# Patient Record
Sex: FEMALE | Race: WHITE | NOT HISPANIC OR LATINO | URBAN - METROPOLITAN AREA
[De-identification: names, ages, dates, MRNs, and addresses within clinical notes are randomized per-mention and may not be internally consistent; named-entity substitution may affect disease eponyms.]

---

## 2023-04-19 ENCOUNTER — OFFICE VISIT (OUTPATIENT)
Dept: PRIMARY CARE | Facility: CLINIC | Age: 38
End: 2023-04-19
Payer: COMMERCIAL

## 2023-04-19 VITALS
HEART RATE: 62 BPM | WEIGHT: 180.13 LBS | SYSTOLIC BLOOD PRESSURE: 108 MMHG | DIASTOLIC BLOOD PRESSURE: 70 MMHG | TEMPERATURE: 98.2 F | BODY MASS INDEX: 31.92 KG/M2 | HEIGHT: 63 IN | RESPIRATION RATE: 16 BRPM | OXYGEN SATURATION: 99 %

## 2023-04-19 DIAGNOSIS — J06.9 VIRAL UPPER RESPIRATORY TRACT INFECTION: ICD-10-CM

## 2023-04-19 DIAGNOSIS — R53.83 OTHER FATIGUE: Primary | ICD-10-CM

## 2023-04-19 DIAGNOSIS — Z20.818 STREPTOCOCCUS EXPOSURE: ICD-10-CM

## 2023-04-19 PROCEDURE — 87081 CULTURE SCREEN ONLY: CPT

## 2023-04-19 PROCEDURE — 99214 OFFICE O/P EST MOD 30 MIN: CPT

## 2023-04-19 PROCEDURE — 1036F TOBACCO NON-USER: CPT

## 2023-04-19 RX ORDER — BENZONATATE 200 MG/1
CAPSULE ORAL
COMMUNITY
Start: 2023-04-17 | End: 2023-11-09 | Stop reason: ALTCHOICE

## 2023-04-19 RX ORDER — LANOLIN ALCOHOL/MO/W.PET/CERES
CREAM (GRAM) TOPICAL
COMMUNITY

## 2023-04-19 RX ORDER — AMOXICILLIN AND CLAVULANATE POTASSIUM 875; 125 MG/1; MG/1
1 TABLET, FILM COATED ORAL 2 TIMES DAILY
COMMUNITY
End: 2023-11-09 | Stop reason: ALTCHOICE

## 2023-04-19 RX ORDER — MULTIVITAMIN
TABLET ORAL
COMMUNITY

## 2023-04-19 RX ORDER — MOMETASONE FUROATE 110 UG/1
2 INHALANT RESPIRATORY (INHALATION)
Qty: 1 EACH | Refills: 11 | Status: SHIPPED | OUTPATIENT
Start: 2023-04-19 | End: 2023-11-09 | Stop reason: ALTCHOICE

## 2023-04-19 RX ORDER — ASPIRIN 325 MG
1 TABLET, DELAYED RELEASE (ENTERIC COATED) ORAL
COMMUNITY
Start: 2022-01-04

## 2023-04-19 ASSESSMENT — ENCOUNTER SYMPTOMS
SORE THROAT: 1
HEADACHES: 0
SWOLLEN GLANDS: 0
DIAPHORESIS: 0
WHEEZING: 0
RHINORRHEA: 0
MYALGIAS: 0
WEAKNESS: 0
CHANGE IN BOWEL HABIT: 0
NECK PAIN: 0
VISUAL CHANGE: 0
SINUS PAIN: 0
DIARRHEA: 0
CHILLS: 0
VERTIGO: 0
VOMITING: 0
FEVER: 0
NUMBNESS: 0
ANOREXIA: 0
COUGH: 1
ABDOMINAL PAIN: 0
NAUSEA: 0
DYSURIA: 0
JOINT SWELLING: 0
FATIGUE: 1

## 2023-04-19 ASSESSMENT — PATIENT HEALTH QUESTIONNAIRE - PHQ9
1. LITTLE INTEREST OR PLEASURE IN DOING THINGS: NOT AT ALL
2. FEELING DOWN, DEPRESSED OR HOPELESS: NOT AT ALL
SUM OF ALL RESPONSES TO PHQ9 QUESTIONS 1 AND 2: 0

## 2023-04-19 ASSESSMENT — COLUMBIA-SUICIDE SEVERITY RATING SCALE - C-SSRS
2. HAVE YOU ACTUALLY HAD ANY THOUGHTS OF KILLING YOURSELF?: NO
6. HAVE YOU EVER DONE ANYTHING, STARTED TO DO ANYTHING, OR PREPARED TO DO ANYTHING TO END YOUR LIFE?: NO
1. IN THE PAST MONTH, HAVE YOU WISHED YOU WERE DEAD OR WISHED YOU COULD GO TO SLEEP AND NOT WAKE UP?: NO

## 2023-04-19 NOTE — PROGRESS NOTES
Subjective   Patient ID: Karen Thomson is a 37 y.o. female who presents for URI and Fatigue.    URI   This is a new problem. The current episode started 1 to 4 weeks ago. The problem has been gradually improving. There has been no fever. Associated symptoms include congestion, coughing and a sore throat. Pertinent negatives include no abdominal pain, chest pain, diarrhea, dysuria, ear pain, headaches, joint pain, joint swelling, nausea, neck pain, plugged ear sensation, rash, rhinorrhea, sinus pain, sneezing, swollen glands, vomiting or wheezing. Associated symptoms comments: Apple watch has been having low spo2 readings as low as 89% urgent care gave her albuterol inhaler which has resolved this issue. . She has tried inhaler use for the symptoms. The treatment provided mild relief.   Fatigue  This is a new problem. The current episode started 1 to 4 weeks ago. The problem occurs constantly. The problem has been unchanged. Associated symptoms include congestion, coughing, fatigue and a sore throat. Pertinent negatives include no abdominal pain, anorexia, change in bowel habit, chest pain, chills, diaphoresis, fever, headaches, joint swelling, myalgias, nausea, neck pain, numbness, rash, swollen glands, urinary symptoms, vertigo, visual change, vomiting or weakness. The symptoms are aggravated by coughing. She has tried relaxation for the symptoms. The treatment provided no relief.    Of note 2 of her 3 children have strep, she was tested with a rapid on Monday is here today because she would like a follow-up lab test.     Review of Systems   Constitutional:  Positive for fatigue. Negative for chills, diaphoresis and fever.   HENT:  Positive for congestion and sore throat. Negative for ear pain, rhinorrhea, sinus pain and sneezing.    Respiratory:  Positive for cough. Negative for wheezing.    Cardiovascular:  Negative for chest pain.   Gastrointestinal:  Negative for abdominal pain, anorexia, change in bowel  "habit, diarrhea, nausea and vomiting.   Genitourinary:  Negative for dysuria.   Musculoskeletal:  Negative for joint pain, joint swelling, myalgias and neck pain.   Skin:  Negative for rash.   Neurological:  Negative for vertigo, weakness, numbness and headaches.       Objective   /70   Pulse 62   Temp 36.8 °C (98.2 °F)   Resp 16   Ht 1.6 m (5' 3\")   Wt 81.7 kg (180 lb 2 oz)   SpO2 99%   BMI 31.91 kg/m²     Physical Exam  Constitutional:       Appearance: Normal appearance.   HENT:      Mouth/Throat:      Lips: Pink.      Mouth: Mucous membranes are moist.      Pharynx: Posterior oropharyngeal erythema present. No pharyngeal swelling, oropharyngeal exudate or uvula swelling.   Cardiovascular:      Heart sounds: Normal heart sounds.   Pulmonary:      Breath sounds: Normal breath sounds.   Neurological:      Mental Status: She is alert.         Assessment/Plan   Problem List Items Addressed This Visit    None  Visit Diagnoses       Other fatigue    -  Primary    Relevant Orders    CBC    Comprehensive Metabolic Panel    Vitamin D 1,25 Dihydroxy    TSH with reflex to Free T4 if abnormal    Iron and TIBC    Viral upper respiratory tract infection        Relevant Medications    mometasone (Asmanex Twisthaler) 110 mcg/ actuation (30) inhaler    Other Relevant Orders    CBC    Streptococcus exposure        Relevant Orders    Group A Streptococcus, Culture               "

## 2023-04-19 NOTE — PATIENT INSTRUCTIONS
Today we ordered an inhaled corticosteroid (ICS) please use this daily for 2 weeks.  It is VERY VERY important that after use you rinse your mouth out to avoid the most common side affect from this medication, thrush.

## 2023-04-21 LAB — GROUP A STREP SCREEN, CULTURE: NORMAL

## 2023-05-05 ENCOUNTER — LAB (OUTPATIENT)
Dept: LAB | Facility: LAB | Age: 38
End: 2023-05-05
Payer: COMMERCIAL

## 2023-05-05 DIAGNOSIS — J06.9 VIRAL UPPER RESPIRATORY TRACT INFECTION: ICD-10-CM

## 2023-05-05 DIAGNOSIS — R53.83 OTHER FATIGUE: ICD-10-CM

## 2023-05-05 LAB
ALANINE AMINOTRANSFERASE (SGPT) (U/L) IN SER/PLAS: 14 U/L (ref 7–45)
ALBUMIN (G/DL) IN SER/PLAS: 3.8 G/DL (ref 3.4–5)
ALKALINE PHOSPHATASE (U/L) IN SER/PLAS: 52 U/L (ref 33–110)
ANION GAP IN SER/PLAS: 14 MMOL/L (ref 10–20)
ASPARTATE AMINOTRANSFERASE (SGOT) (U/L) IN SER/PLAS: 18 U/L (ref 9–39)
BILIRUBIN TOTAL (MG/DL) IN SER/PLAS: 0.6 MG/DL (ref 0–1.2)
CALCIUM (MG/DL) IN SER/PLAS: 8.4 MG/DL (ref 8.6–10.3)
CARBON DIOXIDE, TOTAL (MMOL/L) IN SER/PLAS: 22 MMOL/L (ref 21–32)
CHLORIDE (MMOL/L) IN SER/PLAS: 105 MMOL/L (ref 98–107)
CREATININE (MG/DL) IN SER/PLAS: 0.68 MG/DL (ref 0.5–1.05)
ERYTHROCYTE DISTRIBUTION WIDTH (RATIO) BY AUTOMATED COUNT: 12.9 % (ref 11.5–14.5)
ERYTHROCYTE MEAN CORPUSCULAR HEMOGLOBIN CONCENTRATION (G/DL) BY AUTOMATED: 32.1 G/DL (ref 32–36)
ERYTHROCYTE MEAN CORPUSCULAR VOLUME (FL) BY AUTOMATED COUNT: 88 FL (ref 80–100)
ERYTHROCYTES (10*6/UL) IN BLOOD BY AUTOMATED COUNT: 4.59 X10E12/L (ref 4–5.2)
GFR FEMALE: >90 ML/MIN/1.73M2
GLUCOSE (MG/DL) IN SER/PLAS: 66 MG/DL (ref 74–99)
HEMATOCRIT (%) IN BLOOD BY AUTOMATED COUNT: 40.2 % (ref 36–46)
HEMOGLOBIN (G/DL) IN BLOOD: 12.9 G/DL (ref 12–16)
IRON (UG/DL) IN SER/PLAS: 110 UG/DL (ref 35–150)
IRON BINDING CAPACITY (UG/DL) IN SER/PLAS: 368 UG/DL (ref 240–445)
IRON SATURATION (%) IN SER/PLAS: 30 % (ref 25–45)
LEUKOCYTES (10*3/UL) IN BLOOD BY AUTOMATED COUNT: 4.7 X10E9/L (ref 4.4–11.3)
PLATELETS (10*3/UL) IN BLOOD AUTOMATED COUNT: 195 X10E9/L (ref 150–450)
POTASSIUM (MMOL/L) IN SER/PLAS: 4.2 MMOL/L (ref 3.5–5.3)
PROTEIN TOTAL: 5.8 G/DL (ref 6.4–8.2)
SODIUM (MMOL/L) IN SER/PLAS: 137 MMOL/L (ref 136–145)
THYROTROPIN (MIU/L) IN SER/PLAS BY DETECTION LIMIT <= 0.05 MIU/L: 0.86 MIU/L (ref 0.44–3.98)
UREA NITROGEN (MG/DL) IN SER/PLAS: 15 MG/DL (ref 6–23)

## 2023-05-05 PROCEDURE — 85027 COMPLETE CBC AUTOMATED: CPT

## 2023-05-05 PROCEDURE — 82652 VIT D 1 25-DIHYDROXY: CPT

## 2023-05-05 PROCEDURE — 80053 COMPREHEN METABOLIC PANEL: CPT

## 2023-05-05 PROCEDURE — 83540 ASSAY OF IRON: CPT

## 2023-05-05 PROCEDURE — 83550 IRON BINDING TEST: CPT

## 2023-05-05 PROCEDURE — 36415 COLL VENOUS BLD VENIPUNCTURE: CPT

## 2023-05-05 PROCEDURE — 84443 ASSAY THYROID STIM HORMONE: CPT

## 2023-05-08 LAB — VITAMIN D 1,25-DIHYDROXY: 67.5 PG/ML (ref 19.9–79.3)

## 2023-07-13 ENCOUNTER — OFFICE VISIT (OUTPATIENT)
Dept: PRIMARY CARE | Facility: CLINIC | Age: 38
End: 2023-07-13
Payer: COMMERCIAL

## 2023-07-13 VITALS
HEIGHT: 63 IN | WEIGHT: 173 LBS | OXYGEN SATURATION: 98 % | BODY MASS INDEX: 30.65 KG/M2 | DIASTOLIC BLOOD PRESSURE: 72 MMHG | HEART RATE: 80 BPM | SYSTOLIC BLOOD PRESSURE: 107 MMHG

## 2023-07-13 DIAGNOSIS — G47.00 INSOMNIA, UNSPECIFIED TYPE: Primary | ICD-10-CM

## 2023-07-13 PROCEDURE — 1036F TOBACCO NON-USER: CPT

## 2023-07-13 PROCEDURE — 99213 OFFICE O/P EST LOW 20 MIN: CPT

## 2023-07-13 RX ORDER — PHENTERMINE HYDROCHLORIDE 37.5 MG/1
37.5 CAPSULE ORAL
COMMUNITY
Start: 2023-06-22 | End: 2023-11-09 | Stop reason: ALTCHOICE

## 2023-07-13 RX ORDER — HYDROXYZINE HYDROCHLORIDE 25 MG/1
25 TABLET, FILM COATED ORAL NIGHTLY
Qty: 30 TABLET | Refills: 3 | Status: SHIPPED | OUTPATIENT
Start: 2023-07-13 | End: 2023-11-09 | Stop reason: ALTCHOICE

## 2023-07-13 ASSESSMENT — ENCOUNTER SYMPTOMS
HEADACHES: 0
FEVER: 0
ANOREXIA: 0
NECK PAIN: 0
CHILLS: 0
INSOMNIA: 1
SORE THROAT: 0
SWOLLEN GLANDS: 0
JOINT SWELLING: 0
ABDOMINAL PAIN: 0
DIAPHORESIS: 0
CHANGE IN BOWEL HABIT: 0
VOMITING: 0
NUMBNESS: 0
ARTHRALGIAS: 0
VERTIGO: 0
WEAKNESS: 0
FATIGUE: 0
NAUSEA: 0
COUGH: 0
VISUAL CHANGE: 0
MYALGIAS: 0

## 2023-07-13 ASSESSMENT — PATIENT HEALTH QUESTIONNAIRE - PHQ9
1. LITTLE INTEREST OR PLEASURE IN DOING THINGS: NOT AT ALL
SUM OF ALL RESPONSES TO PHQ9 QUESTIONS 1 AND 2: 0
2. FEELING DOWN, DEPRESSED OR HOPELESS: NOT AT ALL

## 2023-07-13 NOTE — PROGRESS NOTES
"Subjective   Patient ID: Karen Thomson is a 37 y.o. female who presents for Insomnia (Karen is here today for trouble sleeping X a couple months).    Insomnia  This is a new problem. The current episode started more than 1 month ago. The problem occurs daily. The problem has been gradually worsening. Pertinent negatives include no abdominal pain, anorexia, arthralgias, change in bowel habit, chest pain, chills, congestion, coughing, diaphoresis, fatigue, fever, headaches, joint swelling, myalgias, nausea, neck pain, numbness, rash, sore throat, swollen glands, urinary symptoms, vertigo, visual change, vomiting or weakness. The symptoms are aggravated by stress (denies snoring, or partner snoring, has had stress due to passing of her father). Treatments tried: melatonin. The treatment provided mild relief.        Review of Systems   Constitutional:  Negative for chills, diaphoresis, fatigue and fever.   HENT:  Negative for congestion and sore throat.    Respiratory:  Negative for cough.    Cardiovascular:  Negative for chest pain.   Gastrointestinal:  Negative for abdominal pain, anorexia, change in bowel habit, nausea and vomiting.   Musculoskeletal:  Negative for arthralgias, joint swelling, myalgias and neck pain.   Skin:  Negative for rash.   Neurological:  Negative for vertigo, weakness, numbness and headaches.   Psychiatric/Behavioral:  The patient has insomnia.        Objective   /72   Pulse 80   Ht 1.6 m (5' 3\")   Wt 78.5 kg (173 lb)   SpO2 98%   BMI 30.65 kg/m²     Physical Exam  Vitals and nursing note reviewed.   Constitutional:       General: She is not in acute distress.     Appearance: Normal appearance. She is normal weight. She is not ill-appearing.   Cardiovascular:      Pulses: Normal pulses.      Heart sounds: Normal heart sounds.   Pulmonary:      Effort: Pulmonary effort is normal.      Breath sounds: Normal breath sounds.   Neurological:      Mental Status: She is alert and " oriented to person, place, and time.   Psychiatric:         Mood and Affect: Mood normal.         Behavior: Behavior normal.         Thought Content: Thought content normal.         Judgment: Judgment normal.         Assessment/Plan

## 2023-11-08 PROBLEM — R09.82 POSTNASAL DRIP: Status: ACTIVE | Noted: 2023-11-08

## 2023-11-08 PROBLEM — N92.0 MENORRHAGIA WITH REGULAR CYCLE: Status: ACTIVE | Noted: 2023-11-08

## 2023-11-08 PROBLEM — J30.2 SEASONAL ALLERGIES: Status: ACTIVE | Noted: 2020-02-26

## 2023-11-08 PROBLEM — E66.9 CLASS 1 OBESITY WITH BODY MASS INDEX (BMI) OF 33.0 TO 33.9 IN ADULT: Status: ACTIVE | Noted: 2023-11-08

## 2023-11-08 PROBLEM — J34.2 DEVIATED NASAL SEPTUM: Status: ACTIVE | Noted: 2023-11-08

## 2023-11-08 PROBLEM — N92.1 METRORRHAGIA: Status: ACTIVE | Noted: 2023-11-08

## 2023-11-08 PROBLEM — H02.889 MGD (MEIBOMIAN GLAND DISEASE): Status: ACTIVE | Noted: 2023-11-08

## 2023-11-08 PROBLEM — F41.9 ANXIETY: Status: ACTIVE | Noted: 2023-11-08

## 2023-11-08 PROBLEM — R79.89 LOW VITAMIN D LEVEL: Status: ACTIVE | Noted: 2023-11-08

## 2023-11-08 PROBLEM — F32.A DEPRESSION: Status: ACTIVE | Noted: 2023-11-08

## 2023-11-08 PROBLEM — Z98.891 HISTORY OF CESAREAN SECTION: Status: ACTIVE | Noted: 2020-02-26

## 2023-11-08 PROBLEM — R16.1 SPLENOMEGALY: Status: ACTIVE | Noted: 2023-11-08

## 2023-11-08 PROBLEM — J34.3 HYPERTROPHY OF BOTH INFERIOR NASAL TURBINATES: Status: ACTIVE | Noted: 2023-11-08

## 2023-11-08 PROBLEM — E66.9 OBESITY (BMI 30-39.9): Status: ACTIVE | Noted: 2023-11-08

## 2023-11-08 PROBLEM — R63.2 POLYPHAGIA: Status: ACTIVE | Noted: 2023-11-08

## 2023-11-08 PROBLEM — E66.811 CLASS 1 OBESITY WITH BODY MASS INDEX (BMI) OF 33.0 TO 33.9 IN ADULT: Status: ACTIVE | Noted: 2023-11-08

## 2023-11-08 PROBLEM — R93.89 ABNORMAL CT SCAN, CHEST: Status: ACTIVE | Noted: 2023-11-08

## 2023-11-08 RX ORDER — SERTRALINE HYDROCHLORIDE 50 MG/1
50 TABLET, FILM COATED ORAL DAILY
COMMUNITY
End: 2023-11-09 | Stop reason: ALTCHOICE

## 2023-11-09 ENCOUNTER — OFFICE VISIT (OUTPATIENT)
Dept: OBSTETRICS AND GYNECOLOGY | Facility: CLINIC | Age: 38
End: 2023-11-09
Payer: COMMERCIAL

## 2023-11-09 VITALS
DIASTOLIC BLOOD PRESSURE: 70 MMHG | BODY MASS INDEX: 30.48 KG/M2 | WEIGHT: 172 LBS | SYSTOLIC BLOOD PRESSURE: 114 MMHG | HEIGHT: 63 IN

## 2023-11-09 DIAGNOSIS — Z01.419 WELL WOMAN EXAM: Primary | ICD-10-CM

## 2023-11-09 PROCEDURE — 99395 PREV VISIT EST AGE 18-39: CPT | Performed by: OBSTETRICS & GYNECOLOGY

## 2023-11-09 PROCEDURE — 1036F TOBACCO NON-USER: CPT | Performed by: OBSTETRICS & GYNECOLOGY

## 2023-11-09 ASSESSMENT — ENCOUNTER SYMPTOMS
CARDIOVASCULAR NEGATIVE: 1
HEMATOLOGIC/LYMPHATIC NEGATIVE: 1
NEUROLOGICAL NEGATIVE: 1
ALLERGIC/IMMUNOLOGIC NEGATIVE: 1
RESPIRATORY NEGATIVE: 1
PSYCHIATRIC NEGATIVE: 1
EYES NEGATIVE: 1
MUSCULOSKELETAL NEGATIVE: 1
GASTROINTESTINAL NEGATIVE: 1
ENDOCRINE NEGATIVE: 1
CONSTITUTIONAL NEGATIVE: 1

## 2023-11-09 NOTE — PROGRESS NOTES
Subjective   Patient ID: Karen Thomson is a 38 y.o. female who presents for Annual Exam (BRENDEN//LAST PAP: 06/10/2022/LAST MAMM: n/a//Chaperone Declined, Neisha Cabrera, MAII/).  Health is good.    Weight loss journey.    Phentermine working for her.  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   Dad  over the summer.   Goes to the heart doctor, but normal cholesterol.         Review of Systems   Constitutional: Negative.    HENT: Negative.     Eyes: Negative.    Respiratory: Negative.     Cardiovascular: Negative.    Gastrointestinal: Negative.    Endocrine: Negative.    Genitourinary: Negative.    Musculoskeletal: Negative.    Skin: Negative.    Allergic/Immunologic: Negative.    Neurological: Negative.    Hematological: Negative.    Psychiatric/Behavioral: Negative.         Objective   Physical Exam  Constitutional:       Appearance: Normal appearance. She is normal weight.   HENT:      Head: Normocephalic.   Neck:      Thyroid: No thyroid mass or thyromegaly.   Pulmonary:      Effort: Pulmonary effort is normal.   Chest:      Chest wall: No mass.   Breasts:     Right: Normal.      Left: Normal.   Abdominal:      Palpations: Abdomen is soft.   Genitourinary:     General: Normal vulva.      Exam position: Lithotomy position.      Labia:         Right: No tenderness.         Left: No tenderness.       Vagina: Normal.      Cervix: Normal.      Uterus: Normal.       Adnexa: Right adnexa normal and left adnexa normal.   Musculoskeletal:         General: Normal range of motion.      Cervical back: Normal range of motion and neck supple.   Lymphadenopathy:      Upper Body:      Right upper body: No supraclavicular or axillary adenopathy.      Left upper body: No supraclavicular or axillary adenopathy.   Skin:     General: Skin is warm and dry.   Neurological:      General: No focal deficit present.      Mental Status: She is alert.   Psychiatric:         Mood and Affect: Mood normal.         Behavior: Behavior normal.          Thought Content: Thought content normal.         Judgment: Judgment normal.         Assessment/Plan   Problem List Items Addressed This Visit             ICD-10-CM    Well woman exam - Primary Z01.419     Pap is due in 2027.  Vas for birth control.  Mammogram next year.

## 2024-01-04 ENCOUNTER — OFFICE VISIT (OUTPATIENT)
Dept: PRIMARY CARE | Facility: CLINIC | Age: 39
End: 2024-01-04
Payer: COMMERCIAL

## 2024-01-04 VITALS
RESPIRATION RATE: 16 BRPM | WEIGHT: 183.8 LBS | BODY MASS INDEX: 32.57 KG/M2 | OXYGEN SATURATION: 98 % | DIASTOLIC BLOOD PRESSURE: 66 MMHG | SYSTOLIC BLOOD PRESSURE: 122 MMHG | HEART RATE: 92 BPM | HEIGHT: 63 IN

## 2024-01-04 DIAGNOSIS — E66.9 OBESITY (BMI 30.0-34.9): Primary | ICD-10-CM

## 2024-01-04 PROCEDURE — 99214 OFFICE O/P EST MOD 30 MIN: CPT | Performed by: STUDENT IN AN ORGANIZED HEALTH CARE EDUCATION/TRAINING PROGRAM

## 2024-01-04 PROCEDURE — 1036F TOBACCO NON-USER: CPT | Performed by: STUDENT IN AN ORGANIZED HEALTH CARE EDUCATION/TRAINING PROGRAM

## 2024-01-04 RX ORDER — SEMAGLUTIDE 0.25 MG/.5ML
0.25 INJECTION, SOLUTION SUBCUTANEOUS
Qty: 6 ML | Refills: 0 | Status: SHIPPED | OUTPATIENT
Start: 2024-01-04 | End: 2024-01-15 | Stop reason: SDUPTHER

## 2024-01-04 ASSESSMENT — PATIENT HEALTH QUESTIONNAIRE - PHQ9
SUM OF ALL RESPONSES TO PHQ9 QUESTIONS 1 AND 2: 0
1. LITTLE INTEREST OR PLEASURE IN DOING THINGS: NOT AT ALL
2. FEELING DOWN, DEPRESSED OR HOPELESS: NOT AT ALL

## 2024-01-04 NOTE — PROGRESS NOTES
Subjective   Patient ID: Karen Thomson is a 38 y.o. female who presents for Follow-up (Pt is here for discuss ozempic.).    Pt presents to the office for discussion for weight loss. Pt has struggled off and on for years regarding her weight. Struggled through the holidays with break with holidays.    Prior Weight: 222lbs  Current Weight: 183lbs    Weight Loss strategies tried: Intermittent phentermine, weight watchers, working with comprehensive weight loss management      Exercise: Sit-ups, arm weights, home gym with the treadmill.      Social:  with Children.   Occupation:    Alcohol: Occasional   Smoke: Non-Smoker  Drugs: No hx of Drug Use     Family hx significant for: CV Disease, OCD, MDD, BROCK, Crohns, DMII, Cancer  Surgical hx significant for: Tonsillectomy, Egg Retrieval, Removal of Uterine Mass,               Objective   Physical Exam  Vitals reviewed.   Constitutional:       General: She is not in acute distress.     Appearance: She is not ill-appearing.   HENT:      Right Ear: Tympanic membrane and ear canal normal.      Left Ear: Tympanic membrane and ear canal normal.      Mouth/Throat:      Mouth: Mucous membranes are moist.      Pharynx: Oropharynx is clear. No oropharyngeal exudate or posterior oropharyngeal erythema.   Eyes:      Extraocular Movements: Extraocular movements intact.      Conjunctiva/sclera: Conjunctivae normal.      Pupils: Pupils are equal, round, and reactive to light.   Neck:      Vascular: No carotid bruit.   Cardiovascular:      Rate and Rhythm: Normal rate and regular rhythm.      Heart sounds: No murmur heard.     No gallop.   Pulmonary:      Effort: Pulmonary effort is normal.      Breath sounds: Normal breath sounds. No wheezing, rhonchi or rales.   Abdominal:      General: Abdomen is flat. Bowel sounds are normal.      Palpations: Abdomen is soft.      Tenderness: There is no abdominal tenderness.   Musculoskeletal:      Cervical  back: Neck supple.      Left lower leg: No edema.   Skin:     General: Skin is warm and dry.      Findings: No rash.   Neurological:      General: No focal deficit present.      Mental Status: She is alert and oriented to person, place, and time.      Gait: Gait normal.   Psychiatric:         Mood and Affect: Mood normal.         Behavior: Behavior normal.         Assessment/Plan     Obesity   Current weight:   Current BMI: 32.56  Prior Weight: 222lbs  Current Weight: 183  Weight Loss strategies tried: Intermittent phentermine, weight watchers, working with comprehensive weight loss management   Attempts since 2019  Will trial wegovy 0.25mg weekly   Labs ordered: cbc, cmp, lipid panel, tsh, vit d, insulin        Aleah Webber DO 01/04/24 9:04 AM

## 2024-01-05 ENCOUNTER — LAB (OUTPATIENT)
Dept: LAB | Facility: LAB | Age: 39
End: 2024-01-05
Payer: COMMERCIAL

## 2024-01-05 DIAGNOSIS — E66.9 OBESITY (BMI 30.0-34.9): ICD-10-CM

## 2024-01-05 LAB
25(OH)D3 SERPL-MCNC: 45 NG/ML (ref 30–100)
ALBUMIN SERPL BCP-MCNC: 4 G/DL (ref 3.4–5)
ALP SERPL-CCNC: 59 U/L (ref 33–110)
ALT SERPL W P-5'-P-CCNC: 13 U/L (ref 7–45)
ANION GAP SERPL CALC-SCNC: 12 MMOL/L (ref 10–20)
AST SERPL W P-5'-P-CCNC: 17 U/L (ref 9–39)
BASOPHILS # BLD AUTO: 0.04 X10*3/UL (ref 0–0.1)
BASOPHILS NFR BLD AUTO: 0.9 %
BILIRUB SERPL-MCNC: 0.9 MG/DL (ref 0–1.2)
BUN SERPL-MCNC: 16 MG/DL (ref 6–23)
CALCIUM SERPL-MCNC: 8.9 MG/DL (ref 8.6–10.3)
CHLORIDE SERPL-SCNC: 105 MMOL/L (ref 98–107)
CHOLEST SERPL-MCNC: 191 MG/DL (ref 0–199)
CHOLESTEROL/HDL RATIO: 3
CO2 SERPL-SCNC: 25 MMOL/L (ref 21–32)
CREAT SERPL-MCNC: 0.68 MG/DL (ref 0.5–1.05)
EOSINOPHIL # BLD AUTO: 0.12 X10*3/UL (ref 0–0.7)
EOSINOPHIL NFR BLD AUTO: 2.8 %
ERYTHROCYTE [DISTWIDTH] IN BLOOD BY AUTOMATED COUNT: 12.9 % (ref 11.5–14.5)
GFR SERPL CREATININE-BSD FRML MDRD: >90 ML/MIN/1.73M*2
GLUCOSE SERPL-MCNC: 78 MG/DL (ref 74–99)
HCT VFR BLD AUTO: 41 % (ref 36–46)
HDLC SERPL-MCNC: 63.2 MG/DL
HGB BLD-MCNC: 13.3 G/DL (ref 12–16)
IMM GRANULOCYTES # BLD AUTO: 0.01 X10*3/UL (ref 0–0.7)
IMM GRANULOCYTES NFR BLD AUTO: 0.2 % (ref 0–0.9)
INSULIN SERPL-ACNC: 8 UIU/ML (ref 3–25)
LDLC SERPL CALC-MCNC: 103 MG/DL
LYMPHOCYTES # BLD AUTO: 1.91 X10*3/UL (ref 1.2–4.8)
LYMPHOCYTES NFR BLD AUTO: 44.2 %
MCH RBC QN AUTO: 28.5 PG (ref 26–34)
MCHC RBC AUTO-ENTMCNC: 32.4 G/DL (ref 32–36)
MCV RBC AUTO: 88 FL (ref 80–100)
MONOCYTES # BLD AUTO: 0.44 X10*3/UL (ref 0.1–1)
MONOCYTES NFR BLD AUTO: 10.2 %
NEUTROPHILS # BLD AUTO: 1.8 X10*3/UL (ref 1.2–7.7)
NEUTROPHILS NFR BLD AUTO: 41.7 %
NON HDL CHOLESTEROL: 128 MG/DL (ref 0–149)
NRBC BLD-RTO: 0 /100 WBCS (ref 0–0)
PLATELET # BLD AUTO: 195 X10*3/UL (ref 150–450)
POTASSIUM SERPL-SCNC: 4.1 MMOL/L (ref 3.5–5.3)
PROT SERPL-MCNC: 6.3 G/DL (ref 6.4–8.2)
RBC # BLD AUTO: 4.67 X10*6/UL (ref 4–5.2)
SODIUM SERPL-SCNC: 138 MMOL/L (ref 136–145)
TRIGL SERPL-MCNC: 122 MG/DL (ref 0–149)
TSH SERPL-ACNC: 1.34 MIU/L (ref 0.44–3.98)
VLDL: 24 MG/DL (ref 0–40)
WBC # BLD AUTO: 4.3 X10*3/UL (ref 4.4–11.3)

## 2024-01-05 PROCEDURE — 80061 LIPID PANEL: CPT

## 2024-01-05 PROCEDURE — 80053 COMPREHEN METABOLIC PANEL: CPT

## 2024-01-05 PROCEDURE — 83525 ASSAY OF INSULIN: CPT

## 2024-01-05 PROCEDURE — 84443 ASSAY THYROID STIM HORMONE: CPT

## 2024-01-05 PROCEDURE — 85025 COMPLETE CBC W/AUTO DIFF WBC: CPT

## 2024-01-05 PROCEDURE — 82306 VITAMIN D 25 HYDROXY: CPT

## 2024-01-05 PROCEDURE — 36415 COLL VENOUS BLD VENIPUNCTURE: CPT

## 2024-01-15 ENCOUNTER — APPOINTMENT (OUTPATIENT)
Dept: PRIMARY CARE | Facility: CLINIC | Age: 39
End: 2024-01-15
Payer: COMMERCIAL

## 2024-01-15 DIAGNOSIS — E66.9 OBESITY (BMI 30.0-34.9): ICD-10-CM

## 2024-01-15 RX ORDER — SEMAGLUTIDE 0.25 MG/.5ML
0.25 INJECTION, SOLUTION SUBCUTANEOUS
Qty: 6 ML | Refills: 0 | Status: SHIPPED | OUTPATIENT
Start: 2024-01-15 | End: 2024-01-24 | Stop reason: WASHOUT

## 2024-01-17 ENCOUNTER — TELEPHONE (OUTPATIENT)
Dept: PRIMARY CARE | Facility: CLINIC | Age: 39
End: 2024-01-17
Payer: COMMERCIAL

## 2024-01-24 ENCOUNTER — PATIENT MESSAGE (OUTPATIENT)
Dept: PRIMARY CARE | Facility: CLINIC | Age: 39
End: 2024-01-24

## 2024-01-24 ENCOUNTER — OFFICE VISIT (OUTPATIENT)
Dept: PRIMARY CARE | Facility: CLINIC | Age: 39
End: 2024-01-24
Payer: COMMERCIAL

## 2024-01-24 VITALS
OXYGEN SATURATION: 98 % | SYSTOLIC BLOOD PRESSURE: 130 MMHG | HEART RATE: 78 BPM | WEIGHT: 188 LBS | BODY MASS INDEX: 33.31 KG/M2 | DIASTOLIC BLOOD PRESSURE: 87 MMHG | HEIGHT: 63 IN

## 2024-01-24 DIAGNOSIS — R53.82 CHRONIC FATIGUE: ICD-10-CM

## 2024-01-24 DIAGNOSIS — E66.09 CLASS 1 OBESITY DUE TO EXCESS CALORIES WITH BODY MASS INDEX (BMI) OF 33.0 TO 33.9 IN ADULT, UNSPECIFIED WHETHER SERIOUS COMORBIDITY PRESENT: Primary | ICD-10-CM

## 2024-01-24 DIAGNOSIS — B02.9 HERPES ZOSTER WITHOUT COMPLICATION: Primary | ICD-10-CM

## 2024-01-24 DIAGNOSIS — Z71.3 WEIGHT LOSS COUNSELING, ENCOUNTER FOR: ICD-10-CM

## 2024-01-24 PROCEDURE — 1036F TOBACCO NON-USER: CPT

## 2024-01-24 PROCEDURE — 99213 OFFICE O/P EST LOW 20 MIN: CPT

## 2024-01-24 RX ORDER — TRIAMCINOLONE ACETONIDE 1 MG/G
OINTMENT TOPICAL
COMMUNITY
Start: 2024-01-13 | End: 2024-01-24 | Stop reason: SINTOL

## 2024-01-24 RX ORDER — PREDNISONE 10 MG/1
TABLET ORAL
COMMUNITY
Start: 2024-01-13 | End: 2024-03-27 | Stop reason: ALTCHOICE

## 2024-01-24 RX ORDER — TRIAMCINOLONE ACETONIDE 1 MG/G
OINTMENT TOPICAL 2 TIMES DAILY PRN
Qty: 60 G | Refills: 5 | Status: SHIPPED | OUTPATIENT
Start: 2024-01-24 | End: 2024-04-04 | Stop reason: WASHOUT

## 2024-01-24 RX ORDER — GABAPENTIN 100 MG/1
100 CAPSULE ORAL 3 TIMES DAILY
Qty: 90 CAPSULE | Refills: 0 | Status: SHIPPED | OUTPATIENT
Start: 2024-01-24 | End: 2024-03-27 | Stop reason: ALTCHOICE

## 2024-01-24 ASSESSMENT — PATIENT HEALTH QUESTIONNAIRE - PHQ9
SUM OF ALL RESPONSES TO PHQ9 QUESTIONS 1 AND 2: 0
2. FEELING DOWN, DEPRESSED OR HOPELESS: NOT AT ALL
1. LITTLE INTEREST OR PLEASURE IN DOING THINGS: NOT AT ALL

## 2024-01-24 NOTE — PROGRESS NOTES
"Subjective   Patient ID: Karen Thomson is a 38 y.o. female who presents for Follow-up (Saw derm on 1/23, possible shingles on left leg and up to back area on left side thought was from hot tub also went to  on 1/13 after having rash for 3 days ).    12/18- rash eye and mouth [facility] gave doxy and steroid cream  1/6- [facility] [place], rash back right leg and lower back-painful  1/13-urgent care declared hot tub rash prednisone and stronger cream  1/23-[facility] for follow-up face and mouth clear doxy finished Sunday         Review of Systems   Constitutional:  Negative for fatigue and fever.   Respiratory:  Negative for cough and shortness of breath.    Cardiovascular:  Negative for chest pain and leg swelling.   Musculoskeletal:  Negative for gait problem and neck pain.   Skin:  Positive for rash. Negative for color change, pallor and wound.       Objective   /87   Pulse 78   Ht 1.6 m (5' 3\")   Wt 85.3 kg (188 lb)   SpO2 98%   BMI 33.30 kg/m²     Physical Exam  Vitals and nursing note reviewed.   Constitutional:       Appearance: Normal appearance.   Cardiovascular:      Pulses: Normal pulses.      Heart sounds: Normal heart sounds.   Pulmonary:      Effort: Pulmonary effort is normal.      Breath sounds: Normal breath sounds.   Skin:         Neurological:      Mental Status: She is alert.         Assessment/Plan   Problem List Items Addressed This Visit    None  Visit Diagnoses         Codes    Herpes zoster without complication    -  Primary B02.9    Relevant Medications    triamcinolone (Kenalog) 0.1 % ointment    gabapentin (Neurontin) 100 mg capsule               "

## 2024-01-25 ASSESSMENT — ENCOUNTER SYMPTOMS
COLOR CHANGE: 0
WOUND: 0
SHORTNESS OF BREATH: 0
COUGH: 0
FEVER: 0
FATIGUE: 0
NECK PAIN: 0

## 2024-02-14 ENCOUNTER — TELEPHONE (OUTPATIENT)
Dept: PRIMARY CARE | Facility: CLINIC | Age: 39
End: 2024-02-14
Payer: COMMERCIAL

## 2024-02-14 DIAGNOSIS — E66.09 CLASS 1 OBESITY DUE TO EXCESS CALORIES WITH BODY MASS INDEX (BMI) OF 33.0 TO 33.9 IN ADULT, UNSPECIFIED WHETHER SERIOUS COMORBIDITY PRESENT: ICD-10-CM

## 2024-02-14 DIAGNOSIS — Z71.3 WEIGHT LOSS COUNSELING, ENCOUNTER FOR: ICD-10-CM

## 2024-02-20 ENCOUNTER — OFFICE VISIT (OUTPATIENT)
Dept: PRIMARY CARE | Facility: CLINIC | Age: 39
End: 2024-02-20
Payer: COMMERCIAL

## 2024-02-20 VITALS
DIASTOLIC BLOOD PRESSURE: 76 MMHG | HEART RATE: 74 BPM | BODY MASS INDEX: 31.89 KG/M2 | WEIGHT: 180 LBS | OXYGEN SATURATION: 98 % | SYSTOLIC BLOOD PRESSURE: 112 MMHG

## 2024-02-20 DIAGNOSIS — E66.09 CLASS 1 OBESITY DUE TO EXCESS CALORIES WITH BODY MASS INDEX (BMI) OF 33.0 TO 33.9 IN ADULT, UNSPECIFIED WHETHER SERIOUS COMORBIDITY PRESENT: ICD-10-CM

## 2024-02-20 DIAGNOSIS — Z71.3 WEIGHT LOSS COUNSELING, ENCOUNTER FOR: Primary | ICD-10-CM

## 2024-02-20 PROCEDURE — 1036F TOBACCO NON-USER: CPT

## 2024-02-20 PROCEDURE — 3008F BODY MASS INDEX DOCD: CPT

## 2024-02-20 PROCEDURE — 99213 OFFICE O/P EST LOW 20 MIN: CPT

## 2024-02-20 ASSESSMENT — ENCOUNTER SYMPTOMS
DIARRHEA: 0
ACTIVITY CHANGE: 0
ABDOMINAL PAIN: 0
CONSTIPATION: 0
NAUSEA: 0
FEVER: 0
PALPITATIONS: 0
APPETITE CHANGE: 0
FATIGUE: 0
UNEXPECTED WEIGHT CHANGE: 0

## 2024-02-20 NOTE — PROGRESS NOTES
Subjective   Patient ID: Karen Thomson is a 38 y.o. female who presents for Follow-up (Follow up on weight zep bound , doesn't feel like it suppresses her appetite or anything ).    Subjective  Karen Thomson is a 38 y.o. female here for discussion regarding weight loss. She has noted a weight gain of approximately 50 pounds over the last 5 years. She feels ideal weight is 160 pounds. Weight at graduation from high school was 120 pounds. History of eating disorders: anorexia nervosa. There is a family history positive for obesity in the patient, mother, father, sister, and brother. Previous treatments for obesity include self-directed dieting, supervised diet program, very low calorie diet, Weight Watchers, and phentermine. Obesity associated medical conditions: none. Obesity associated medications: none. Cardiovascular risk factors besides obesity: obesity (BMI >= 30 kg/m2).    Starting weight 222  Now 180 down 8 lbs this month       Review of Systems   Constitutional:  Negative for activity change, appetite change, fatigue, fever and unexpected weight change.   Cardiovascular:  Negative for chest pain, palpitations and leg swelling.   Gastrointestinal:  Negative for abdominal pain, constipation, diarrhea and nausea.       Objective   /76   Pulse 74   Wt 81.6 kg (180 lb)   SpO2 98%   BMI 31.89 kg/m²     Physical Exam  Vitals and nursing note reviewed.   Constitutional:       Appearance: Normal appearance. She is obese.   Cardiovascular:      Pulses: Normal pulses.      Heart sounds: Normal heart sounds.   Pulmonary:      Effort: Pulmonary effort is normal.      Breath sounds: Normal breath sounds.   Neurological:      Mental Status: She is alert.         Assessment/Plan   Problem List Items Addressed This Visit             ICD-10-CM    Class 1 obesity with body mass index (BMI) of 33.0 to 33.9 in adult E66.9, Z68.33    Relevant Medications    tirzepatide, weight loss, (Zepbound) 5 mg/0.5 mL  injection     Other Visit Diagnoses         Codes    Weight loss counseling, encounter for     Z71.3    Relevant Medications    tirzepatide, weight loss, (Zepbound) 5 mg/0.5 mL injection        Followup in one month for weight check and dose adjustment.

## 2024-03-11 ENCOUNTER — TELEPHONE (OUTPATIENT)
Dept: OBSTETRICS AND GYNECOLOGY | Facility: CLINIC | Age: 39
End: 2024-03-11
Payer: COMMERCIAL

## 2024-03-11 NOTE — TELEPHONE ENCOUNTER
Pt called stating that she was seen at an urgent care over the weekend and tx'd with Levaquin for a sinus infection and a vaginal boil. Pt states boil was not accessed but she was told the ATB would help with it also. Pt said that she knicked her upper labia, near her clitoris while shaving last week. The area became red, sore ans swollen. It popped in the shower and drained pus. Pt reports the area is somewhat better,  and red. Warm compresses, keeping area clean and dry and using bacitracin reviewed. Pt will monitor area and follow up in office if sx do not resolve after ATB tx or if they become worse.

## 2024-03-27 ENCOUNTER — OFFICE VISIT (OUTPATIENT)
Dept: PRIMARY CARE | Facility: CLINIC | Age: 39
End: 2024-03-27
Payer: COMMERCIAL

## 2024-03-27 ENCOUNTER — TELEPHONE (OUTPATIENT)
Dept: PRIMARY CARE | Facility: CLINIC | Age: 39
End: 2024-03-27

## 2024-03-27 VITALS
HEIGHT: 63 IN | BODY MASS INDEX: 30.97 KG/M2 | WEIGHT: 174.8 LBS | OXYGEN SATURATION: 98 % | SYSTOLIC BLOOD PRESSURE: 98 MMHG | DIASTOLIC BLOOD PRESSURE: 62 MMHG | RESPIRATION RATE: 18 BRPM | HEART RATE: 78 BPM

## 2024-03-27 DIAGNOSIS — Z71.3 WEIGHT LOSS COUNSELING, ENCOUNTER FOR: ICD-10-CM

## 2024-03-27 DIAGNOSIS — E66.09 CLASS 1 OBESITY DUE TO EXCESS CALORIES WITH BODY MASS INDEX (BMI) OF 33.0 TO 33.9 IN ADULT, UNSPECIFIED WHETHER SERIOUS COMORBIDITY PRESENT: ICD-10-CM

## 2024-03-27 DIAGNOSIS — E66.9 OBESITY (BMI 30-39.9): Primary | ICD-10-CM

## 2024-03-27 PROBLEM — E66.811 CLASS 1 OBESITY WITH BODY MASS INDEX (BMI) OF 33.0 TO 33.9 IN ADULT: Status: RESOLVED | Noted: 2023-11-08 | Resolved: 2024-03-27

## 2024-03-27 PROBLEM — N92.1 METRORRHAGIA: Status: RESOLVED | Noted: 2023-11-08 | Resolved: 2024-03-27

## 2024-03-27 PROBLEM — Z98.891 HISTORY OF CESAREAN SECTION: Status: RESOLVED | Noted: 2020-02-26 | Resolved: 2024-03-27

## 2024-03-27 PROBLEM — R79.89 LOW VITAMIN D LEVEL: Status: RESOLVED | Noted: 2023-11-08 | Resolved: 2024-03-27

## 2024-03-27 PROCEDURE — 3008F BODY MASS INDEX DOCD: CPT | Performed by: NURSE PRACTITIONER

## 2024-03-27 PROCEDURE — 1036F TOBACCO NON-USER: CPT | Performed by: NURSE PRACTITIONER

## 2024-03-27 PROCEDURE — 99213 OFFICE O/P EST LOW 20 MIN: CPT | Performed by: NURSE PRACTITIONER

## 2024-03-27 RX ORDER — SEMAGLUTIDE 0.5 MG/.5ML
0.5 INJECTION, SOLUTION SUBCUTANEOUS
Qty: 2 ML | Refills: 2 | Status: SHIPPED | OUTPATIENT
Start: 2024-03-27 | End: 2024-05-08 | Stop reason: RX

## 2024-03-27 NOTE — ASSESSMENT & PLAN NOTE
- eat off the smaller plate (like the salad plate)  - half the plate should be vegetables, 1/4 protein, 1/4 carbs - for lunch and dinner  -  discussed dietary changes including proper protein intake, increase vegetable intake, fruit intake, low carbohydrate intake, and no processed food intake.  - discussed meal prepping    put your fork down between bites  - drink at least 64 oz of water day.  do not consume large amounts of water with your meal  - do not drink sugary drinks such as pop or specialty coffee drinks  -  eat your vegetables and protein first.  Have vegetables at lunch and dinner  - discussed with patient to increase activity 4 days a week preferably 5. Exercise should be done 5 days a week including walking for 20-30 minutes each day.  Start slowly if you have not been exercising - start with 15 minutes 3-4 times a week.   -  keep log of calorie intake and food intake and bring with you to next appointment.You can use an brenda on your phone such as my fitness pal.  - discussed with patient using activity trackers such as a fit bit. log  activity daily and bring  activity log at next visit  - increase your protein intake - should have at least 4 servings of protein per day  - decrease carb intake - discussed carb serving size and to read packages  - limit carb servings to 4 servings a day  -* difficulty obtaining zepbound so will try wegovy 0.5 mg once a week  - follow up in 6 weeks for weight check and will order labs as she has stopped eating protein

## 2024-03-27 NOTE — TELEPHONE ENCOUNTER
Karen called stating that she reached out to [facility] and they have Zepound in stock. She would like her script to go to mail order ([facility]). Pharmacy updated in chart.

## 2024-03-27 NOTE — PATIENT INSTRUCTIONS
Finish your last dose of zepbound this week  I sent in wegovy to see if that is back in stock.  If not - may need to try your mail order pharmacy  Please follow up in 6 weeks for a weight check

## 2024-03-27 NOTE — PROGRESS NOTES
"Subjective   Patient ID: Karen Thomson is a 38 y.o. female who presents for Weight Check (Karen is in today for weight check. Stated she is having a difficult time finding pharmacies who have Zepbound, but besides that no other concerns. ).    Presents to follow up for obesity   Weight in January 2024 183  Current weight 174  Has tried weight watchers, phenteramine, exercise.  Attempted to start wegovy in January - but was on backorder.  Zepbound was approved by insurance in Feb and she started at 2.5 mg.  She was increased to 5 mg 4 weeks ago.  She has lost 9 pounds since January.  She has noticed that her appetite has greatly.  She denies constipation, diarrhea, nausea, vomiting, or  headaches  She is having difficulty obtaining the zepbound.           Review of Systems   All other systems reviewed and are negative.      Objective   BP 98/62   Pulse 78   Resp 18   Ht 1.6 m (5' 3\")   Wt 79.3 kg (174 lb 12.8 oz)   SpO2 98%   BMI 30.96 kg/m²     Physical Exam  Vitals and nursing note reviewed.   Constitutional:       General: She is not in acute distress.     Appearance: Normal appearance. She is normal weight.   HENT:      Head: Normocephalic and atraumatic.      Right Ear: External ear normal.      Left Ear: External ear normal.      Nose: Nose normal.      Mouth/Throat:      Mouth: Mucous membranes are moist.      Pharynx: Oropharynx is clear.   Eyes:      Extraocular Movements: Extraocular movements intact.      Conjunctiva/sclera: Conjunctivae normal.      Pupils: Pupils are equal, round, and reactive to light.   Neck:      Vascular: No carotid bruit.   Cardiovascular:      Rate and Rhythm: Normal rate and regular rhythm.      Pulses: Normal pulses.      Heart sounds: Normal heart sounds.   Pulmonary:      Effort: Pulmonary effort is normal.      Breath sounds: Normal breath sounds.   Musculoskeletal:      Cervical back: Normal range of motion and neck supple.   Lymphadenopathy:      Cervical: No " cervical adenopathy.   Skin:     General: Skin is warm and dry.      Capillary Refill: Capillary refill takes less than 2 seconds.   Neurological:      Mental Status: She is alert and oriented to person, place, and time.   Psychiatric:         Mood and Affect: Mood normal.         Behavior: Behavior normal.         Thought Content: Thought content normal.         Judgment: Judgment normal.         Assessment/Plan   Problem List Items Addressed This Visit             ICD-10-CM    Obesity (BMI 30-39.9) - Primary E66.9     - eat off the smaller plate (like the salad plate)  - half the plate should be vegetables, 1/4 protein, 1/4 carbs - for lunch and dinner  -  discussed dietary changes including proper protein intake, increase vegetable intake, fruit intake, low carbohydrate intake, and no processed food intake.  - discussed meal prepping    put your fork down between bites  - drink at least 64 oz of water day.  do not consume large amounts of water with your meal  - do not drink sugary drinks such as pop or specialty coffee drinks  -  eat your vegetables and protein first.  Have vegetables at lunch and dinner  - discussed with patient to increase activity 4 days a week preferably 5. Exercise should be done 5 days a week including walking for 20-30 minutes each day.  Start slowly if you have not been exercising - start with 15 minutes 3-4 times a week.   -  keep log of calorie intake and food intake and bring with you to next appointment.You can use an brenda on your phone such as my fitness pal.  - discussed with patient using activity trackers such as a fit bit. log  activity daily and bring  activity log at next visit  - increase your protein intake - should have at least 4 servings of protein per day  - decrease carb intake - discussed carb serving size and to read packages  - limit carb servings to 4 servings a day  -* difficulty obtaining zepbound so will try wegovy 0.5 mg once a week  - follow up in 6 weeks for  weight check and will order labs as she has stopped eating protein          Relevant Medications    semaglutide, weight loss, (Wegovy) 0.5 mg/0.5 mL pen injector

## 2024-04-04 ENCOUNTER — HOSPITAL ENCOUNTER (OUTPATIENT)
Dept: RADIOLOGY | Facility: CLINIC | Age: 39
Discharge: HOME | End: 2024-04-04
Payer: COMMERCIAL

## 2024-04-04 ENCOUNTER — OFFICE VISIT (OUTPATIENT)
Dept: PRIMARY CARE | Facility: CLINIC | Age: 39
End: 2024-04-04
Payer: COMMERCIAL

## 2024-04-04 VITALS
BODY MASS INDEX: 30.65 KG/M2 | HEART RATE: 87 BPM | WEIGHT: 173 LBS | SYSTOLIC BLOOD PRESSURE: 136 MMHG | OXYGEN SATURATION: 96 % | HEIGHT: 63 IN | DIASTOLIC BLOOD PRESSURE: 80 MMHG

## 2024-04-04 DIAGNOSIS — R05.2 SUBACUTE COUGH: ICD-10-CM

## 2024-04-04 DIAGNOSIS — R93.89 ABNORMAL CT SCAN, CHEST: ICD-10-CM

## 2024-04-04 DIAGNOSIS — R06.02 SOB (SHORTNESS OF BREATH): ICD-10-CM

## 2024-04-04 DIAGNOSIS — R91.1 LUNG NODULE: ICD-10-CM

## 2024-04-04 DIAGNOSIS — R06.02 SOB (SHORTNESS OF BREATH): Primary | ICD-10-CM

## 2024-04-04 PROCEDURE — 1036F TOBACCO NON-USER: CPT | Performed by: NURSE PRACTITIONER

## 2024-04-04 PROCEDURE — 71046 X-RAY EXAM CHEST 2 VIEWS: CPT

## 2024-04-04 PROCEDURE — 3008F BODY MASS INDEX DOCD: CPT | Performed by: NURSE PRACTITIONER

## 2024-04-04 PROCEDURE — 71046 X-RAY EXAM CHEST 2 VIEWS: CPT | Performed by: RADIOLOGY

## 2024-04-04 PROCEDURE — 99214 OFFICE O/P EST MOD 30 MIN: CPT | Performed by: NURSE PRACTITIONER

## 2024-04-04 ASSESSMENT — ENCOUNTER SYMPTOMS
LOSS OF SENSATION IN FEET: 0
DEPRESSION: 0
OCCASIONAL FEELINGS OF UNSTEADINESS: 1

## 2024-04-04 NOTE — PROGRESS NOTES
"Subjective   Patient ID: Karen Thomson is a 38 y.o. female who presents for Respiratory Distress (Patient mentioned for the past couple weeks she has been having difficulty breathing with a coldness when she breaths. Patient mentioned she is not sure if this is from when she had Covid at the end of January 2024. ).    38-year-old female here for acute sick visit.  She states that her biggest concern is the inability to breathe without it feeling \"cold\".   had similar s&s 2022. PNA. She now has symptoms similar. Has a cough that comes and goes, fatigue and decreased appetite  Cold in the chest when breathing in. No pain.   COVID 1/28/24. Felt better now feels sick again  2023 CT scan- ct scoring showed abn areas was rec 6-12mo follow up.   \"The previously noted area of consolidation in the right lower lobe has decreased in prominence. A residual 7 mm pleural-based nodule  versus a small surrounding area of nodule like consolidation and/or scarring remains.  The posterior left lower lobe has developed several focal areas of small pleural-based scarring and thickening, the largest 3 x 7 mm.\"     2021 CT chest RT LL consolidation was treated for PNA-  \"1. Dense right lower lobe consolidation, slightly smaller than  earlier this month.  2. Mild mediastinal and right hilar adenopathy, unchanged.  3. 3 mm nonobstructing right renal calculus.  4. Splenomegaly.\"           Review of Systems    Objective   /80   Pulse 87   Ht 1.6 m (5' 3\")   Wt 78.5 kg (173 lb)   SpO2 96%   BMI 30.65 kg/m²     Physical Exam  Constitutional:       Appearance: Normal appearance.   Cardiovascular:      Rate and Rhythm: Normal rate and regular rhythm.   Pulmonary:      Effort: Pulmonary effort is normal.      Breath sounds: Normal breath sounds.   Neurological:      Mental Status: She is alert and oriented to person, place, and time.   Psychiatric:         Mood and Affect: Mood normal.         Assessment/Plan   Diagnoses and all " orders for this visit:  SOB (shortness of breath)  Comments:  no S&S acute URI. Get CXR stat  Orders:  -     XR chest 2 views; Future  -     albuterol 90 mcg/actuation aerosol powdr breath activated inhaler; Inhale 2 puffs every 6 hours if needed for wheezing or shortness of breath.  -     CT chest w and wo IV contrast; Future  Subacute cough  Comments:  CXR stat. I am concerned with the abn CT scan chest x 2  Orders:  -     XR chest 2 views; Future  -     albuterol 90 mcg/actuation aerosol powdr breath activated inhaler; Inhale 2 puffs every 6 hours if needed for wheezing or shortness of breath.  -     CT chest w and wo IV contrast; Future  Lung nodule  Comments:  CT scans reviewed with pt.  Orders:  -     CT chest w and wo IV contrast; Future  Abnormal CT scan, chest

## 2024-04-15 ENCOUNTER — APPOINTMENT (OUTPATIENT)
Dept: RADIOLOGY | Facility: HOSPITAL | Age: 39
End: 2024-04-15
Payer: COMMERCIAL

## 2024-04-19 DIAGNOSIS — E66.09 CLASS 1 OBESITY DUE TO EXCESS CALORIES WITH BODY MASS INDEX (BMI) OF 33.0 TO 33.9 IN ADULT, UNSPECIFIED WHETHER SERIOUS COMORBIDITY PRESENT: ICD-10-CM

## 2024-04-19 DIAGNOSIS — Z71.3 WEIGHT LOSS COUNSELING, ENCOUNTER FOR: ICD-10-CM

## 2024-04-30 DIAGNOSIS — E66.9 OBESITY (BMI 30-39.9): Primary | ICD-10-CM

## 2024-05-02 ENCOUNTER — APPOINTMENT (OUTPATIENT)
Dept: INTEGRATIVE MEDICINE | Facility: CLINIC | Age: 39
End: 2024-05-02
Payer: COMMERCIAL

## 2024-05-08 ENCOUNTER — OFFICE VISIT (OUTPATIENT)
Dept: PRIMARY CARE | Facility: CLINIC | Age: 39
End: 2024-05-08
Payer: COMMERCIAL

## 2024-05-08 VITALS
SYSTOLIC BLOOD PRESSURE: 106 MMHG | HEIGHT: 63 IN | DIASTOLIC BLOOD PRESSURE: 70 MMHG | HEART RATE: 71 BPM | BODY MASS INDEX: 30.76 KG/M2 | OXYGEN SATURATION: 99 % | WEIGHT: 173.6 LBS | RESPIRATION RATE: 18 BRPM

## 2024-05-08 DIAGNOSIS — R63.2 POLYPHAGIA: Primary | ICD-10-CM

## 2024-05-08 DIAGNOSIS — E66.9 OBESITY (BMI 30-39.9): ICD-10-CM

## 2024-05-08 PROCEDURE — 3008F BODY MASS INDEX DOCD: CPT | Performed by: NURSE PRACTITIONER

## 2024-05-08 PROCEDURE — 99213 OFFICE O/P EST LOW 20 MIN: CPT | Performed by: NURSE PRACTITIONER

## 2024-05-08 PROCEDURE — 1036F TOBACCO NON-USER: CPT | Performed by: NURSE PRACTITIONER

## 2024-05-08 NOTE — PROGRESS NOTES
"Subjective   Patient ID: Karen Thomson is a 38 y.o. female who presents for Weight Check (Karen is in today for a weight check. States she has been without medication for approx 2 months. I attempted to complete a PA for patient's Zepbound, but was unsuccessful after 15 mins on the phone with [employer] rep stated that she was not able to find patient in their system. Patient was notified of this. ).    Presents to follow up for weight loss.  She has not been able to get the zepbound or wegovy due to the shortage.  She has been maintaining her weight.  She has given up protein to help with her weight.         Review of Systems   All other systems reviewed and are negative.      Objective   /70   Pulse 71   Resp 18   Ht 1.6 m (5' 3\")   Wt 78.7 kg (173 lb 9.6 oz)   SpO2 99%   BMI 30.75 kg/m²     Physical Exam  Vitals and nursing note reviewed.   Constitutional:       General: She is not in acute distress.     Appearance: Normal appearance. She is normal weight.   HENT:      Head: Normocephalic and atraumatic.      Right Ear: External ear normal.      Left Ear: External ear normal.      Nose: Nose normal.      Mouth/Throat:      Mouth: Mucous membranes are moist.      Pharynx: Oropharynx is clear.   Eyes:      Extraocular Movements: Extraocular movements intact.      Conjunctiva/sclera: Conjunctivae normal.      Pupils: Pupils are equal, round, and reactive to light.   Neck:      Vascular: No carotid bruit.   Cardiovascular:      Rate and Rhythm: Normal rate and regular rhythm.      Pulses: Normal pulses.      Heart sounds: Normal heart sounds.   Pulmonary:      Effort: Pulmonary effort is normal.      Breath sounds: Normal breath sounds.   Musculoskeletal:      Cervical back: Normal range of motion and neck supple.   Lymphadenopathy:      Cervical: No cervical adenopathy.   Skin:     General: Skin is warm and dry.      Capillary Refill: Capillary refill takes less than 2 seconds. "   Neurological:      Mental Status: She is alert and oriented to person, place, and time.   Psychiatric:         Mood and Affect: Mood normal.         Behavior: Behavior normal.         Thought Content: Thought content normal.         Judgment: Judgment normal.         Assessment/Plan   Problem List Items Addressed This Visit             ICD-10-CM    Obesity (BMI 30-39.9) E66.9     - eat off the smaller plate (like the salad plate)  - half the plate should be vegetables, 1/4 protein, 1/4 carbs - for lunch and dinner  -  discussed dietary changes including proper protein intake, increase vegetable intake, fruit intake, low carbohydrate intake, and no processed food intake.  - discussed meal prepping    put your fork down between bites  - drink at least 64 oz of water day.  do not consume large amounts of water with your meal  - do not drink sugary drinks such as pop or specialty coffee drinks  -  eat your vegetables and protein first.  Have vegetables at lunch and dinner  - discussed with patient to increase activity 4 days a week preferably 5. Exercise should be done 5 days a week including walking for 20-30 minutes each day.  Start slowly if you have not been exercising - start with 15 minutes 3-4 times a week.   -  keep log of calorie intake and food intake and bring with you to next appointment.You can use an brenda on your phone such as my fitness pal.  - discussed with patient using activity trackers such as a fit bit. log  activity daily and bring  activity log at next visit  - increase your protein intake - should have at least 4 servings of protein per day  - decrease carb intake - discussed carb serving size and to read packages  - limit carb servings to 4 servings a day  -* difficulty obtaining zepbound and wegovy due to shortage.  She will call and she if she can find either one and if she does I will send in a script and then will have her follow up 6 weeks after  -cbc,cmp, vitamin b, vitamin d, iron          Polyphagia - Primary R63.2    Relevant Orders    Comprehensive Metabolic Panel    Vitamin B12    Vitamin D 25-Hydroxy,Total (for eval of Vitamin D levels)    Iron and TIBC

## 2024-05-08 NOTE — PATIENT INSTRUCTIONS
Let me know if you can find the wegovy and I will send that in  There is a shortage of the zepbound and not sure when the supply will return  Have your labs done

## 2024-05-08 NOTE — ASSESSMENT & PLAN NOTE
- eat off the smaller plate (like the salad plate)  - half the plate should be vegetables, 1/4 protein, 1/4 carbs - for lunch and dinner  -  discussed dietary changes including proper protein intake, increase vegetable intake, fruit intake, low carbohydrate intake, and no processed food intake.  - discussed meal prepping    put your fork down between bites  - drink at least 64 oz of water day.  do not consume large amounts of water with your meal  - do not drink sugary drinks such as pop or specialty coffee drinks  -  eat your vegetables and protein first.  Have vegetables at lunch and dinner  - discussed with patient to increase activity 4 days a week preferably 5. Exercise should be done 5 days a week including walking for 20-30 minutes each day.  Start slowly if you have not been exercising - start with 15 minutes 3-4 times a week.   -  keep log of calorie intake and food intake and bring with you to next appointment.You can use an brenda on your phone such as [employer] fitness pal.  - discussed with patient using activity trackers such as a fit bit. log  activity daily and bring  activity log at next visit  - increase your protein intake - should have at least 4 servings of protein per day  - decrease carb intake - discussed carb serving size and to read packages  - limit carb servings to 4 servings a day  -* difficulty obtaining zepbound and wegovy due to shortage.  She will call and she if she can find either one and if she does I will send in a script and then will have her follow up 6 weeks after  -cbc,cmp, vitamin b, vitamin d, iron

## 2024-05-13 DIAGNOSIS — Z13.6 SCREENING FOR CARDIOVASCULAR CONDITION: Primary | ICD-10-CM

## 2024-05-20 ENCOUNTER — TELEMEDICINE (OUTPATIENT)
Dept: INTEGRATIVE MEDICINE | Facility: CLINIC | Age: 39
End: 2024-05-20
Payer: COMMERCIAL

## 2024-05-20 DIAGNOSIS — G47.9 SLEEP DISTURBANCE: ICD-10-CM

## 2024-05-20 DIAGNOSIS — Z00.00 HEALTHCARE MAINTENANCE: Primary | ICD-10-CM

## 2024-05-20 PROCEDURE — 99214 OFFICE O/P EST MOD 30 MIN: CPT | Performed by: NURSE PRACTITIONER

## 2024-05-20 PROCEDURE — 3008F BODY MASS INDEX DOCD: CPT | Performed by: NURSE PRACTITIONER

## 2024-05-20 NOTE — PATIENT INSTRUCTIONS
Plan:   Plan:   See sleep hygiene and see if you can make any changes to your current schedule to create the best environment for sleep.   Start practicing intentional deep breathing methods like 4-7-8 breathing method at least 4 times daily to help regulate cortisol levels. See attachment for reference   Consider practicing guided meditation ten minutes a day to add to benefits. Recommended apps: Calm, Headspace, Insight timer, fitmind.   See fullscript and recommended supplements   Referral for accupuncture  Follow up 8 weeks, discuss lab work that was done.

## 2024-05-20 NOTE — PROGRESS NOTES
Karen  presents for a new patient visit.     Today we reviewed pillars of Lifestyle Medicine: Sleep restoration, Nutrition, Stress Management, Interpersonal and Social Connection, Avoidance of Risky Behavior. The benefits were discussed for each pillar and how incorporation of changes in lifestyle would benefit the patient and his/her lifestyle choices.     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  Personal connection level: 9/10    Somatic complaints:   Fell on tailbone in March, still giving her issues. Sitting on a cushion seat.   Nodules in lungs     Goal of the visit: Just trying to live a healthier lifestyle, turning 39 this year.     Sleep hygiene:  Takes melatonin every night 5 mg.   Sunday-Thursday. Because of work and end of year.   Not a consistent time going to bed, anywhere between 9 -11.   WE discussed 10-6  WE discussed sleep hygiene   2 diet cokes       Stress management: (Identifiable stressors)   Are you currently using any stress management tools/resources     Supplements:   Discussed Genestra Vit D, liquid.   Multi vitamin  B Complex.   Magnesium glycinate     Nutrition:   Stopped eating meat in January.   Following an all plant based diet. Felt really good, but this last month felt really tired.   Discussed plant based protein.     Plan:   Plan:   See sleep hygiene and see if you can make any changes to your current schedule to create the best environment for sleep.   Start practicing intentional deep breathing methods like 4-7-8 breathing method at least 4 times daily to help regulate cortisol levels. See attachment for reference   Consider practicing guided meditation ten minutes a day to add to benefits. Recommended apps: Calm, Headspace, Insight timer, fitmind.   See fullscript and recommended supplements   Referral for accupuncture  Follow up 8 weeks, discuss lab work that was done.     No follow-ups on file.     [email or web address]

## 2024-05-21 ENCOUNTER — PATIENT MESSAGE (OUTPATIENT)
Dept: PRIMARY CARE | Facility: CLINIC | Age: 39
End: 2024-05-21
Payer: COMMERCIAL

## 2024-05-21 DIAGNOSIS — E66.9 OBESITY (BMI 30-39.9): Primary | ICD-10-CM

## 2024-05-21 DIAGNOSIS — E66.09 CLASS 1 OBESITY DUE TO EXCESS CALORIES WITH BODY MASS INDEX (BMI) OF 33.0 TO 33.9 IN ADULT, UNSPECIFIED WHETHER SERIOUS COMORBIDITY PRESENT: ICD-10-CM

## 2024-06-18 ENCOUNTER — APPOINTMENT (OUTPATIENT)
Dept: LAB | Facility: LAB | Age: 39
End: 2024-06-18
Payer: COMMERCIAL

## 2024-06-18 ENCOUNTER — HOSPITAL ENCOUNTER (OUTPATIENT)
Dept: RADIOLOGY | Facility: CLINIC | Age: 39
Discharge: HOME | End: 2024-06-18
Payer: COMMERCIAL

## 2024-06-18 ENCOUNTER — APPOINTMENT (OUTPATIENT)
Dept: PRIMARY CARE | Facility: CLINIC | Age: 39
End: 2024-06-18
Payer: COMMERCIAL

## 2024-06-18 VITALS
BODY MASS INDEX: 30.48 KG/M2 | HEART RATE: 109 BPM | DIASTOLIC BLOOD PRESSURE: 77 MMHG | SYSTOLIC BLOOD PRESSURE: 109 MMHG | WEIGHT: 172 LBS | HEIGHT: 63 IN | OXYGEN SATURATION: 96 %

## 2024-06-18 DIAGNOSIS — E66.9 OBESITY (BMI 30-39.9): ICD-10-CM

## 2024-06-18 DIAGNOSIS — E66.09 CLASS 1 OBESITY DUE TO EXCESS CALORIES WITH BODY MASS INDEX (BMI) OF 33.0 TO 33.9 IN ADULT, UNSPECIFIED WHETHER SERIOUS COMORBIDITY PRESENT: ICD-10-CM
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
## 2024-06-18 DIAGNOSIS — Z71.3 WEIGHT LOSS COUNSELING, ENCOUNTER FOR: ICD-10-CM
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
## 2024-06-18 PROCEDURE — 99214 OFFICE O/P EST MOD 30 MIN: CPT

## 2024-06-18 PROCEDURE — 72220 X-RAY EXAM SACRUM TAILBONE: CPT

## 2024-06-18 PROCEDURE — 1036F TOBACCO NON-USER: CPT

## 2024-06-18 PROCEDURE — 3008F BODY MASS INDEX DOCD: CPT

## 2024-06-18 PROCEDURE — 72220 X-RAY EXAM SACRUM TAILBONE: CPT | Performed by: RADIOLOGY

## 2024-06-18 ASSESSMENT — ENCOUNTER SYMPTOMS
HEMATURIA: 0
LOSS OF CONSCIOUSNESS: 0
NAUSEA: 0
VOMITING: 0
BOWEL INCONTINENCE: 0
NUMBNESS: 0
TINGLING: 0

## 2024-06-18 NOTE — PROGRESS NOTES
"Subjective   Patient ID: Karen Thomson is a 38 y.o. female who presents for Follow-up (Weight check [facility]UV for medication refill no concerns with medication/Concerns with tailbone, back in march fell off hover board and fell skiing and is very painful and having difficulty sitting even with cushion.).    Subjective  Karen Thomson is a 38 y.o. female here for discussion regarding weight loss. She has noted a weight gain of approximately 50 pounds over the last 5 years. She feels ideal weight is 150 pounds. Weight at graduation from high school was 120 pounds. History of eating disorders: anorexia nervosa. There is a family history positive for obesity in the patient, spouse, mother, and father. Previous treatments for obesity include very low calorie diet, Weight Watchers, and optavia. Obesity associated medical conditions: none. Obesity associated medications: none. Cardiovascular risk factors besides obesity: obesity (BMI >= 30 kg/m2).      Objective  Body mass index is 33.7 kg/m².  Starting weight was 190  June 2024-172      Fall  The accident occurred More than 1 week ago. Fall occurred: off hover board, and fell again while sking. She landed on [place]. The point of impact was the buttocks. Pain location: tail months. The pain is at a severity of 5/10. The pain is moderate. The symptoms are aggravated by sitting. Pertinent negatives include no bowel incontinence, hematuria, loss of consciousness, nausea, numbness, tingling or vomiting. She has tried acetaminophen, ice and NSAID (padded seat) for the symptoms. The treatment provided no relief.        Review of Systems   Gastrointestinal:  Negative for bowel incontinence, nausea and vomiting.   Genitourinary:  Negative for hematuria.   Neurological:  Negative for tingling, loss of consciousness and numbness.       Objective   /77   Pulse 109   Ht 1.6 m (5' 3\")   Wt 78 kg (172 lb)   SpO2 96%   BMI 30.47 kg/m²     Physical Exam  Vitals and nursing " note reviewed.   Constitutional:       General: She is not in acute distress.     Appearance: Normal appearance. She is normal weight.   Abdominal:      General: There is no distension.      Tenderness: There is no abdominal tenderness.   Musculoskeletal:         General: Tenderness present.        Back:    Neurological:      Mental Status: She is alert.         Assessment/Plan   Karen was seen today for follow-up.  Diagnoses and all orders for this visit:  Fall, subsequent encounter  -     XR sacrum coccyx 2+ views; Future  Obesity (BMI 30-39.9)  -     tirzepatide, weight loss, (Zepbound) 7.5 mg/0.5 mL injection; Inject 7.5 mg under the skin every 7 days.  Class 1 obesity due to excess calories with body mass index (BMI) of 33.0 to 33.9 in adult, unspecified whether serious comorbidity present  -     tirzepatide, weight loss, (Zepbound) 7.5 mg/0.5 mL injection; Inject 7.5 mg under the skin every 7 days.  Weight loss counseling, encounter for  -     tirzepatide, weight loss, (Zepbound) 7.5 mg/0.5 mL injection; Inject 7.5 mg under the skin every 7 days.

## 2024-06-19 ENCOUNTER — LAB (OUTPATIENT)
Dept: LAB | Facility: LAB | Age: 39
End: 2024-06-19
Payer: COMMERCIAL

## 2024-06-19 DIAGNOSIS — Z13.6 SCREENING FOR CARDIOVASCULAR CONDITION: ICD-10-CM

## 2024-06-19 DIAGNOSIS — R63.2 POLYPHAGIA: ICD-10-CM

## 2024-06-19 LAB
25(OH)D3 SERPL-MCNC: 43 NG/ML (ref 30–100)
ALBUMIN SERPL BCP-MCNC: 4.2 G/DL (ref 3.4–5)
ALP SERPL-CCNC: 54 U/L (ref 33–110)
ALT SERPL W P-5'-P-CCNC: 13 U/L (ref 7–45)
ANION GAP SERPL CALC-SCNC: 15 MMOL/L (ref 10–20)
AST SERPL W P-5'-P-CCNC: 23 U/L (ref 9–39)
BILIRUB SERPL-MCNC: 0.8 MG/DL (ref 0–1.2)
BUN SERPL-MCNC: 15 MG/DL (ref 6–23)
CALCIUM SERPL-MCNC: 8.9 MG/DL (ref 8.6–10.3)
CHLORIDE SERPL-SCNC: 104 MMOL/L (ref 98–107)
CO2 SERPL-SCNC: 21 MMOL/L (ref 21–32)
CREAT SERPL-MCNC: 0.62 MG/DL (ref 0.5–1.05)
EGFRCR SERPLBLD CKD-EPI 2021: >90 ML/MIN/1.73M*2
GLUCOSE SERPL-MCNC: 70 MG/DL (ref 74–99)
IRON SATN MFR SERPL: 34 % (ref 25–45)
IRON SERPL-MCNC: 128 UG/DL (ref 35–150)
POTASSIUM SERPL-SCNC: 4.2 MMOL/L (ref 3.5–5.3)
PROT SERPL-MCNC: 6.7 G/DL (ref 6.4–8.2)
SODIUM SERPL-SCNC: 136 MMOL/L (ref 136–145)
TIBC SERPL-MCNC: 378 UG/DL (ref 240–445)
UIBC SERPL-MCNC: 250 UG/DL (ref 110–370)
VIT B12 SERPL-MCNC: 335 PG/ML (ref 211–911)

## 2024-06-19 PROCEDURE — 80053 COMPREHEN METABOLIC PANEL: CPT

## 2024-06-19 PROCEDURE — 82607 VITAMIN B-12: CPT

## 2024-06-19 PROCEDURE — 36415 COLL VENOUS BLD VENIPUNCTURE: CPT

## 2024-06-19 PROCEDURE — 82172 ASSAY OF APOLIPOPROTEIN: CPT

## 2024-06-19 PROCEDURE — 83540 ASSAY OF IRON: CPT

## 2024-06-19 PROCEDURE — 83550 IRON BINDING TEST: CPT

## 2024-06-19 PROCEDURE — 82306 VITAMIN D 25 HYDROXY: CPT

## 2024-06-21 LAB — LPA SERPL-MCNC: 90 MG/DL

## 2024-07-01 ENCOUNTER — PATIENT MESSAGE (OUTPATIENT)
Dept: PRIMARY CARE | Facility: CLINIC | Age: 39
End: 2024-07-01
Payer: COMMERCIAL

## 2024-07-01 DIAGNOSIS — E78.41 ELEVATED LIPOPROTEIN A LEVEL: Primary | ICD-10-CM

## 2024-07-03 RX ORDER — ROSUVASTATIN CALCIUM 5 MG/1
5 TABLET, COATED ORAL DAILY
Qty: 90 TABLET | Refills: 3 | Status: SHIPPED | OUTPATIENT
Start: 2024-07-03 | End: 2025-07-03

## 2024-07-12 ENCOUNTER — APPOINTMENT (OUTPATIENT)
Dept: RADIOLOGY | Facility: HOSPITAL | Age: 39
End: 2024-07-12
Payer: COMMERCIAL

## 2024-07-12 ENCOUNTER — HOSPITAL ENCOUNTER (EMERGENCY)
Facility: HOSPITAL | Age: 39
Discharge: HOME | End: 2024-07-12
Payer: COMMERCIAL

## 2024-07-12 VITALS
OXYGEN SATURATION: 100 % | HEIGHT: 63 IN | BODY MASS INDEX: 29.77 KG/M2 | HEART RATE: 78 BPM | DIASTOLIC BLOOD PRESSURE: 83 MMHG | WEIGHT: 168 LBS | SYSTOLIC BLOOD PRESSURE: 118 MMHG | RESPIRATION RATE: 18 BRPM | TEMPERATURE: 98.2 F

## 2024-07-12 DIAGNOSIS — M79.604 PAIN OF RIGHT LOWER EXTREMITY: Primary | ICD-10-CM

## 2024-07-12 PROCEDURE — 2500000001 HC RX 250 WO HCPCS SELF ADMINISTERED DRUGS (ALT 637 FOR MEDICARE OP): Performed by: PHYSICIAN ASSISTANT

## 2024-07-12 PROCEDURE — 93971 EXTREMITY STUDY: CPT | Performed by: RADIOLOGY

## 2024-07-12 PROCEDURE — 93971 EXTREMITY STUDY: CPT

## 2024-07-12 PROCEDURE — 99284 EMERGENCY DEPT VISIT MOD MDM: CPT | Mod: 25

## 2024-07-12 RX ORDER — IBUPROFEN 600 MG/1
600 TABLET ORAL ONCE
Status: COMPLETED | OUTPATIENT
Start: 2024-07-12 | End: 2024-07-12

## 2024-07-12 ASSESSMENT — PAIN - FUNCTIONAL ASSESSMENT: PAIN_FUNCTIONAL_ASSESSMENT: 0-10

## 2024-07-12 ASSESSMENT — LIFESTYLE VARIABLES
HAVE YOU EVER FELT YOU SHOULD CUT DOWN ON YOUR DRINKING: NO
HAVE PEOPLE ANNOYED YOU BY CRITICIZING YOUR DRINKING: NO
EVER FELT BAD OR GUILTY ABOUT YOUR DRINKING: NO
EVER HAD A DRINK FIRST THING IN THE MORNING TO STEADY YOUR NERVES TO GET RID OF A HANGOVER: NO
TOTAL SCORE: 0

## 2024-07-12 ASSESSMENT — PAIN SCALES - GENERAL: PAINLEVEL_OUTOF10: 8

## 2024-07-12 ASSESSMENT — COLUMBIA-SUICIDE SEVERITY RATING SCALE - C-SSRS
1. IN THE PAST MONTH, HAVE YOU WISHED YOU WERE DEAD OR WISHED YOU COULD GO TO SLEEP AND NOT WAKE UP?: NO
2. HAVE YOU ACTUALLY HAD ANY THOUGHTS OF KILLING YOURSELF?: NO
6. HAVE YOU EVER DONE ANYTHING, STARTED TO DO ANYTHING, OR PREPARED TO DO ANYTHING TO END YOUR LIFE?: NO

## 2024-07-12 ASSESSMENT — PAIN DESCRIPTION - LOCATION: LOCATION: LEG

## 2024-07-12 ASSESSMENT — PAIN DESCRIPTION - ORIENTATION: ORIENTATION: RIGHT;LOWER

## 2024-07-12 NOTE — DISCHARGE INSTRUCTIONS
Try heat as well as NSAIDs for your pain at home and keep your future appointment for another assessment. If worsening symptoms come back for another evaluation.

## 2024-07-12 NOTE — ED TRIAGE NOTES
Patient here for leg pain 12 days of right calf pain described as throbbing. Denies injury to the area. No redness, swelling, heat. Parents had hx of blood clots, pt denies personal hx of blood clots. Ambulated to room.

## 2024-07-12 NOTE — ED PROVIDER NOTES
HPI   Chief Complaint   Patient presents with    Leg Pain     12 days of right calf pain described as throbbing. Denies injury to the area. No redness, swelling, heat. Parents had hx of blood clots, pt denies personal hx of blood clots. Ambulated to room.        This is a 38-year-old female coming in for right lower extremity pain.  Patient reports that she recently had a flight and since then has had had right calf pain.  She describes as a cramping sensation and tender to palpation.  Is located right calf only.  She denies any swelling of the area.  She has had varicose veins and thought that it was from that.  Patient states no history of blood clots but does have a family history of blood clots.  Patient denies any shortness of breath or chest pain.  She has not had fevers or chills.  She denies any injury to the leg.  She describes as a cramping sensation denies any cramps or charley horses prior to her discomfort.      History provided by:  Patient                      Oscar Coma Scale Score: 15                     Patient History   Past Medical History:   Diagnosis Date    Acute pharyngitis, unspecified 08/23/2019    Pharyngitis with viral syndrome    Acute upper respiratory infection, unspecified 10/23/2016    Acute URI    Adjustment disorder with depressed mood 11/25/2019    Adjustment disorder with depressed mood    Anemia complicating pregnancy, unspecified trimester ([facility]) 03/16/2016    Anemia in pregnancy    Anxiety disorder, unspecified 11/25/2019    Acute anxiety    Body mass index (BMI) 39.0-39.9, adult 04/26/2021    Body mass index (BMI) of 39.0 to 39.9 in adult    Change in bowel habit 01/13/2015    Change in bowel habits    Chronic sinusitis, unspecified 05/11/2017    Recurrent sinusitis    Class 1 obesity with body mass index (BMI) of 33.0 to 33.9 in adult 11/08/2023    Diarrhea, unspecified 11/02/2021    Acute diarrhea    Disease of upper respiratory tract, unspecified 11/02/2021    Upper  respiratory disease    Dry eye syndrome of bilateral lacrimal glands 2017    Bilateral dry eyes    Encounter for routine postpartum follow-up ([facility]) 2016    Routine postpartum follow-up    Encounter for screening for human immunodeficiency virus (HIV) 2015    Encounter for screening for HIV    Encounter for screening for other infectious and parasitic diseases 2015    Special screening examination for other specified chlamydial diseases    Encounter for screening for other viral diseases 2015    Encounter for screening for other viral diseases    Fever, unspecified 2021    Fever and chills    Headache, unspecified 2017    Severe headache    History of  section 2020    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    Decreased milk production    Localized enlarged lymph nodes 10/20/2016    Anterior cervical adenopathy    Low vitamin D level 2023    Metrorrhagia 2023    Obesity, unspecified 10/25/2021    Class 2 obesity with body mass index (BMI) of 37.0 to 37.9 in adult    Obesity, unspecified 2021    Class 2 obesity with body mass index (BMI) of 36.0 to 36.9 in adult    Ocular pain, left eye 2017    Acute left eye pain    Ocular pain, left eye 2017    Pain of left eye    Other abnormal and inconclusive findings on diagnostic imaging of breast 2017    Abnormal ultrasound of breast    Other conditions influencing health status 2017    History of cough    Other specified health status     No pertinent past medical history    Other specified noninflammatory disorders of vagina 08/15/2018    Vaginal cyst    Other specified noninflammatory disorders of vagina     Vaginal odor    Palpitations 2015    Palpitations    Personal history of diseases of the skin and subcutaneous tissue 2017    History of skin pruritus    Personal history of diseases of the skin and subcutaneous tissue 2018    History of cyst of breast     Personal history of other complications of pregnancy, childbirth and the puerperium 09/11/2020    History of postpartum depression    Personal history of other diseases of the female genital tract 06/27/2015    History of female infertility    Personal history of other diseases of the female genital tract 04/05/2018    History of breast pain    Personal history of other diseases of the nervous system and sense organs 11/08/2021    History of conjunctivitis    Personal history of other diseases of the respiratory system 08/23/2019    History of sore throat    Personal history of other diseases of the respiratory system 03/02/2020    History of acute pharyngitis    Personal history of other diseases of the respiratory system 05/14/2017    History of sinusitis    Personal history of other diseases of the respiratory system 03/20/2019    History of influenza    Personal history of other diseases of urinary system 04/26/2021    H/O acute pyelonephritis    Personal history of other endocrine, nutritional and metabolic disease 06/02/2021    History of obesity    Personal history of other infectious and parasitic diseases 08/23/2019    History of viral gastroenteritis    Personal history of other specified conditions 11/22/2017    History of diarrhea    Personal history of other specified conditions 12/02/2021    History of fatigue    Personal history of other specified conditions 01/13/2015    History of diarrhea    Personal history of other specified conditions 01/13/2015    History of abdominal pain    Personal history of other specified conditions 10/01/2015    History of urinary frequency    Personal history of pneumonia (recurrent) 12/02/2021    History of recurrent pneumonia    Postpartum depression 06/28/2016    Post partum depression    Postpartum depression 04/04/2016    Postpartum depression    Procreative counseling and advice using natural family planning 12/04/2014    Procreative counseling and advice using  natural family planning    Rash and other nonspecific skin eruption 2017    Rash    Unspecified acute conjunctivitis, bilateral 2020    Acute bacterial conjunctivitis of both eyes     Past Surgical History:   Procedure Laterality Date    OTHER SURGICAL HISTORY  2018    Laparoscopic Aspiration Uterine Mass    OTHER SURGICAL HISTORY  2020     section    TONSILLECTOMY  2015    Tonsillectomy     Family History   Problem Relation Name Age of Onset    Other (cardiac disorder) Mother      Other (cardiac disorder) Father      Other (cardiac disorder) Maternal Grandmother      Diabetes Maternal Grandmother      Cancer Maternal Grandmother      Other (cardiac disorder) Paternal Grandmother      Diabetes Paternal Grandmother      Cancer Paternal Grandmother       Social History     Tobacco Use    Smoking status: Never    Smokeless tobacco: Never   Vaping Use    Vaping status: Never Used   Substance Use Topics    Alcohol use: Yes     Comment: socially    Drug use: Never       Physical Exam   ED Triage Vitals [24 1051]   Temperature Heart Rate Respirations BP   36.8 °C (98.2 °F) 78 18 125/89      Pulse Ox Temp Source Heart Rate Source Patient Position   100 % Skin Monitor Lying      BP Location FiO2 (%)     Right arm --       Physical Exam  Vitals and nursing note reviewed.   Constitutional:       General: She is not in acute distress.     Appearance: Normal appearance. She is not ill-appearing or toxic-appearing.   HENT:      Head: Normocephalic and atraumatic.   Eyes:      Extraocular Movements: Extraocular movements intact.      Conjunctiva/sclera: Conjunctivae normal.      Pupils: Pupils are equal, round, and reactive to light.   Cardiovascular:      Rate and Rhythm: Normal rate and regular rhythm.      Pulses: Normal pulses.      Heart sounds: Normal heart sounds.   Pulmonary:      Effort: Pulmonary effort is normal. No respiratory distress.      Breath sounds: Normal breath  sounds.   Abdominal:      General: Abdomen is flat. There is no distension.   Musculoskeletal:         General: Normal range of motion.      Cervical back: Normal range of motion and neck supple.      Comments: No swelling to the right lower extremity where patient's complaint is.  She does have pain to palpation to the calf.   Skin:     General: Skin is warm and dry.   Neurological:      General: No focal deficit present.      Mental Status: She is alert and oriented to person, place, and time.   Psychiatric:         Mood and Affect: Mood normal.         Behavior: Behavior normal.         Thought Content: Thought content normal.         ED Course & MDM   Diagnoses as of 07/12/24 1158   Pain of right lower extremity       Medical Decision Making  Summary:  Medical Decision Making:   Patient presented as described in HPI. Patient case including ROS, PE, and treatment and plan discussed with ED attending if attached as cosigner. Results from labs and or imaging included below if completed. Karen Thomson  is a 38 y.o. coming in for Patient presents with:  Leg Pain: 12 days of right calf pain described as throbbing. Denies injury to the area. No redness, swelling, heat. Parents had hx of blood clots, pt denies personal hx of blood clots. Ambulated to room.   .  Due to patient's complaint as well as history ultrasound was completed of lower extremity.  She was given ibuprofen for discomfort.  Ultrasound was negative.  She is advised on heat to the area as well as NSAIDs.  Patient has an appointment in the future with a specialist for this and advised keep her appointment but given return precautions if she has worsening or change of her symptoms.  Patient will be discharged at this time.      Disposition is completed with shared decision making with the patient or guardian present with the patient. They were advised to follow up with PCP or recommended provider in 2-3 days for another evaluation and exam. I advised  the patient to return or go to closest emergency room immediately if symptoms change, get worse, or new symptoms develop prior to follow up. I explained the plan and treatment course. Patient/guardian is in agreement with plan, treatment course, and follow up and state that they will comply.    Labs Reviewed - No data to display   Vascular US Lower Extremity Venous Duplex Right   Final Result    1. No evidence of deep venous thrombus in the evaluated veins of the    right leg from the inguinal ligament to the popliteal fossa.          Signed by: Wallace Pritchett 7/12/2024 11:51 AM    Dictation workstation:   [id number]                            Tests/Medications/Escalations of Care considered but not given: As in MDM    Patient care discussed with: N/A  Social Determinants affecting care: N/A    Final diagnosis and disposition as documented     Diagnoses as of 07/12/24 1158  Pain of right lower extremity       Shared decision making was completed and determined that patient will be discharged. I discussed the differential; results and discharge plan with the patient and/or family/friend/caregiver if present.  I emphasized the importance of follow-up with the physician I referred them to in the timeframe recommended.  I explained reasons for the patient to return to the Emergency Department. They agreed that if they feel their condition is worsening or if they have any other concern they should call 911 immediately for further assistance. I gave the patient an opportunity to ask all questions they had and answered all of them accordingly. They understand return precautions and discharge instructions. The patient and/or family/friend/caregiver expressed understanding verbally and that they would comply.     Disposition: Discharge      This note has been transcribed using voice recognition and may contain grammatical errors, misplaced words, incorrect words, incorrect phrases or other errors.          Procedure  Procedures     Mark Anthony Cook PA-C  07/12/24 1200

## 2024-07-19 ENCOUNTER — PATIENT MESSAGE (OUTPATIENT)
Dept: PRIMARY CARE | Facility: CLINIC | Age: 39
End: 2024-07-19
Payer: COMMERCIAL

## 2024-07-19 DIAGNOSIS — E66.9 OBESITY (BMI 30-39.9): ICD-10-CM

## 2024-07-19 DIAGNOSIS — E66.09 CLASS 1 OBESITY DUE TO EXCESS CALORIES WITH BODY MASS INDEX (BMI) OF 33.0 TO 33.9 IN ADULT, UNSPECIFIED WHETHER SERIOUS COMORBIDITY PRESENT: Primary | ICD-10-CM

## 2024-07-30 ENCOUNTER — OFFICE VISIT (OUTPATIENT)
Dept: CARDIOLOGY | Facility: CLINIC | Age: 39
End: 2024-07-30
Payer: COMMERCIAL

## 2024-07-30 VITALS
OXYGEN SATURATION: 100 % | DIASTOLIC BLOOD PRESSURE: 68 MMHG | SYSTOLIC BLOOD PRESSURE: 99 MMHG | WEIGHT: 168.7 LBS | BODY MASS INDEX: 29.88 KG/M2 | HEART RATE: 77 BPM

## 2024-07-30 DIAGNOSIS — I83.811 VARICOSE VEINS OF RIGHT LOWER EXTREMITY WITH PAIN: Primary | ICD-10-CM

## 2024-07-30 PROCEDURE — 1036F TOBACCO NON-USER: CPT | Performed by: INTERNAL MEDICINE

## 2024-07-30 PROCEDURE — 99213 OFFICE O/P EST LOW 20 MIN: CPT | Performed by: INTERNAL MEDICINE

## 2024-07-30 ASSESSMENT — PAIN SCALES - GENERAL: PAINLEVEL: 0-NO PAIN

## 2024-07-30 NOTE — PROGRESS NOTES
Referred by self      History of Present Illness:  Karen Thomson is a/an 38 y.o. woman with varicose veins who developed right calf aching after air travel. Sought care in ED due to concern for possible blood clots. Had negative venous duplex. Went home and tried heat on the affect limb with resolution of symptoms. No recurrence.    Overall healthy but with strong family history of ASCVD (both parents  from coronary disease at relatively young ages). She has decent lipid profile but elevated lipoprotein (a). Has been prescribed a low dose of rosuvastatin but has not started it yet.    Past Medical History:   Diagnosis Date    Acute pharyngitis, unspecified 2019    Pharyngitis with viral syndrome    Acute upper respiratory infection, unspecified 10/23/2016    Acute URI    Adjustment disorder with depressed mood 2019    Adjustment disorder with depressed mood    Anemia complicating pregnancy, unspecified trimester ([facility]) 2016    Anemia in pregnancy    Anxiety disorder, unspecified 2019    Acute anxiety    Body mass index (BMI) 39.0-39.9, adult 2021    Body mass index (BMI) of 39.0 to 39.9 in adult    Change in bowel habit 2015    Change in bowel habits    Chronic sinusitis, unspecified 2017    Recurrent sinusitis    Class 1 obesity with body mass index (BMI) of 33.0 to 33.9 in adult 2023    Diarrhea, unspecified 2021    Acute diarrhea    Disease of upper respiratory tract, unspecified 2021    Upper respiratory disease    Dry eye syndrome of bilateral lacrimal glands 2017    Bilateral dry eyes    Encounter for routine postpartum follow-up ([facility]) 2016    Routine postpartum follow-up    Encounter for screening for human immunodeficiency virus (HIV) 2015    Encounter for screening for HIV    Encounter for screening for other infectious and parasitic diseases 2015    Special screening examination for other specified  chlamydial diseases    Encounter for screening for other viral diseases 2015    Encounter for screening for other viral diseases    Fever, unspecified 2021    Fever and chills    Headache, unspecified 2017    Severe headache    History of  section 2020    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    Decreased milk production    Localized enlarged lymph nodes 10/20/2016    Anterior cervical adenopathy    Low vitamin D level 2023    Metrorrhagia 2023    Obesity, unspecified 10/25/2021    Class 2 obesity with body mass index (BMI) of 37.0 to 37.9 in adult    Obesity, unspecified 2021    Class 2 obesity with body mass index (BMI) of 36.0 to 36.9 in adult    Ocular pain, left eye 2017    Acute left eye pain    Ocular pain, left eye 2017    Pain of left eye    Other abnormal and inconclusive findings on diagnostic imaging of breast 2017    Abnormal ultrasound of breast    Other conditions influencing health status 2017    History of cough    Other specified health status     No pertinent past medical history    Other specified noninflammatory disorders of vagina 08/15/2018    Vaginal cyst    Other specified noninflammatory disorders of vagina     Vaginal odor    Palpitations 2015    Palpitations    Personal history of diseases of the skin and subcutaneous tissue 2017    History of skin pruritus    Personal history of diseases of the skin and subcutaneous tissue 2018    History of cyst of breast    Personal history of other complications of pregnancy, childbirth and the puerperium 2020    History of postpartum depression    Personal history of other diseases of the female genital tract 2015    History of female infertility    Personal history of other diseases of the female genital tract 2018    History of breast pain    Personal history of other diseases of the nervous system and sense organs 2021    History of  conjunctivitis    Personal history of other diseases of the respiratory system 2019    History of sore throat    Personal history of other diseases of the respiratory system 2020    History of acute pharyngitis    Personal history of other diseases of the respiratory system 2017    History of sinusitis    Personal history of other diseases of the respiratory system 2019    History of influenza    Personal history of other diseases of urinary system 2021    H/O acute pyelonephritis    Personal history of other endocrine, nutritional and metabolic disease 2021    History of obesity    Personal history of other infectious and parasitic diseases 2019    History of viral gastroenteritis    Personal history of other specified conditions 2017    History of diarrhea    Personal history of other specified conditions 2021    History of fatigue    Personal history of other specified conditions 2015    History of diarrhea    Personal history of other specified conditions 2015    History of abdominal pain    Personal history of other specified conditions 10/01/2015    History of urinary frequency    Personal history of pneumonia (recurrent) 2021    History of recurrent pneumonia    Postpartum depression 2016    Post partum depression    Postpartum depression 2016    Postpartum depression    Procreative counseling and advice using natural family planning 2014    Procreative counseling and advice using natural family planning    Rash and other nonspecific skin eruption 2017    Rash    Unspecified acute conjunctivitis, bilateral 2020    Acute bacterial conjunctivitis of both eyes     Past Surgical History:   Procedure Laterality Date    OTHER SURGICAL HISTORY  2018    Laparoscopic Aspiration Uterine Mass    OTHER SURGICAL HISTORY  2020     section    TONSILLECTOMY  2015    Tonsillectomy     Social History      Tobacco Use    Smoking status: Never    Smokeless tobacco: Never   Vaping Use    Vaping status: Never Used   Substance Use Topics    Alcohol use: Yes     Comment: socially    Drug use: Never     Family History   Problem Relation Name Age of Onset    Other (cardiac disorder) Mother      Other (cardiac disorder) Father      Other (cardiac disorder) Maternal Grandmother      Diabetes Maternal Grandmother      Cancer Maternal Grandmother      Other (cardiac disorder) Paternal Grandmother      Diabetes Paternal Grandmother      Cancer Paternal Grandmother       Current Outpatient Medications   Medication Sig Dispense Refill    cholecalciferol (Vitamin D-3) 1,250 mcg (50,000 unit) capsule Take 1 capsule (50,000 Units) by mouth 1 (one) time per week.      cyanocobalamin (Vitamin B-12) 1,000 mcg tablet Take by mouth.      multivitamin tablet Take by mouth.      rosuvastatin (Crestor) 5 mg tablet Take 1 tablet (5 mg) by mouth once daily. 90 tablet 3    tirzepatide, weight loss, (Zepbound) 10 mg/0.5 mL injection Inject 10 mg under the skin every 7 days. 4 each 3     No current facility-administered medications for this visit.       Physical Examination:  Blood pressure 99/68, pulse 77, weight 76.5 kg (168 lb 11.2 oz), SpO2 100%.  General: well-developed, well-nourished, no distress  Head: normocephalic, atraumatic  Eyes: PERRL, anicteric sclerae, no conjunctival injection  ENT: normal oropharynx  Neck: supple, no carotid bruits, no JVD  Lungs: normal respiratory effort, clear to auscultation bilaterally  Heart: regular, normal S1 and S2, no murmurs, rubs or gallops  Abdomen: normal active bowel sounds, soft, non-distended, non-tender  Extremities: no cyanosis or clubbing  Vascular: bilateral spider and reticular veins, no edema, pulses 2+ and symmetric  Musculoskeletal: no deformities  Neurological: alert and oriented, no gross neurological deficits  Psychological: normal mood and affect   Skin: warm and dry, no rashes or  lesions        Pertinent Labs:    Pertinent Imaging:  Venous duplex ultrasound 7/12/2024   FINDINGS:      RIGHT LOWER EXTREMITY:  There is no evidence of deep venous thrombus in the visualized  portions of the common femoral vein, femoral vein, or popliteal vein.  Respiratory variation and augmentation to calf pressure is noted. The  visualized portion of the posterior tibial, and peroneal veins are  unremarkable.      IMPRESSION:  1. No evidence of deep venous thrombus in the evaluated veins of the  right leg from the inguinal ligament to the popliteal fossa.    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

## 2024-07-31 ENCOUNTER — TELEPHONE (OUTPATIENT)
Dept: OBSTETRICS AND GYNECOLOGY | Facility: CLINIC | Age: 39
End: 2024-07-31

## 2024-07-31 DIAGNOSIS — Z12.31 BREAST CANCER SCREENING BY MAMMOGRAM: Primary | ICD-10-CM

## 2024-07-31 NOTE — TELEPHONE ENCOUNTER
I returned call to patient.  She would like to have her mammogram done after the end of September.  She is a  and would like to get this scheduled as soon as possible.  Advised we would place the order

## 2024-08-15 ENCOUNTER — PATIENT MESSAGE (OUTPATIENT)
Dept: PRIMARY CARE | Facility: CLINIC | Age: 39
End: 2024-08-15
Payer: COMMERCIAL

## 2024-08-15 DIAGNOSIS — E66.9 OBESITY (BMI 30-39.9): Primary | ICD-10-CM

## 2024-08-15 DIAGNOSIS — E66.09 CLASS 1 OBESITY DUE TO EXCESS CALORIES WITH BODY MASS INDEX (BMI) OF 33.0 TO 33.9 IN ADULT, UNSPECIFIED WHETHER SERIOUS COMORBIDITY PRESENT: ICD-10-CM

## 2024-08-15 DIAGNOSIS — Z71.3 WEIGHT LOSS COUNSELING, ENCOUNTER FOR: ICD-10-CM

## 2024-08-16 PROCEDURE — RXMED WILLOW AMBULATORY MEDICATION CHARGE

## 2024-08-20 ENCOUNTER — PHARMACY VISIT (OUTPATIENT)
Dept: PHARMACY | Facility: CLINIC | Age: 39
End: 2024-08-20
Payer: COMMERCIAL

## 2024-09-12 ENCOUNTER — PATIENT MESSAGE (OUTPATIENT)
Dept: PRIMARY CARE | Facility: CLINIC | Age: 39
End: 2024-09-12
Payer: COMMERCIAL

## 2024-09-12 DIAGNOSIS — E66.09 CLASS 1 OBESITY DUE TO EXCESS CALORIES WITH BODY MASS INDEX (BMI) OF 33.0 TO 33.9 IN ADULT, UNSPECIFIED WHETHER SERIOUS COMORBIDITY PRESENT: ICD-10-CM

## 2024-09-12 DIAGNOSIS — Z71.3 WEIGHT LOSS COUNSELING, ENCOUNTER FOR: ICD-10-CM

## 2024-09-12 DIAGNOSIS — E66.9 OBESITY (BMI 30-39.9): Primary | ICD-10-CM

## 2024-09-30 ENCOUNTER — HOSPITAL ENCOUNTER (OUTPATIENT)
Dept: RADIOLOGY | Facility: HOSPITAL | Age: 39
Discharge: HOME | End: 2024-09-30
Payer: COMMERCIAL

## 2024-09-30 VITALS — BODY MASS INDEX: 28 KG/M2 | HEIGHT: 63 IN | WEIGHT: 158 LBS

## 2024-09-30 DIAGNOSIS — Z12.31 BREAST CANCER SCREENING BY MAMMOGRAM: ICD-10-CM

## 2024-09-30 PROCEDURE — 77067 SCR MAMMO BI INCL CAD: CPT | Performed by: STUDENT IN AN ORGANIZED HEALTH CARE EDUCATION/TRAINING PROGRAM

## 2024-09-30 PROCEDURE — 77063 BREAST TOMOSYNTHESIS BI: CPT | Performed by: STUDENT IN AN ORGANIZED HEALTH CARE EDUCATION/TRAINING PROGRAM

## 2024-09-30 PROCEDURE — 77067 SCR MAMMO BI INCL CAD: CPT

## 2024-10-07 ENCOUNTER — APPOINTMENT (OUTPATIENT)
Dept: RADIOLOGY | Facility: HOSPITAL | Age: 39
End: 2024-10-07
Payer: COMMERCIAL

## 2024-10-07 ENCOUNTER — OFFICE VISIT (OUTPATIENT)
Dept: URGENT CARE | Age: 39
End: 2024-10-07
Payer: COMMERCIAL

## 2024-10-07 ENCOUNTER — HOSPITAL ENCOUNTER (EMERGENCY)
Facility: HOSPITAL | Age: 39
Discharge: HOME | End: 2024-10-07
Payer: COMMERCIAL

## 2024-10-07 ENCOUNTER — APPOINTMENT (OUTPATIENT)
Dept: CARDIOLOGY | Facility: HOSPITAL | Age: 39
End: 2024-10-07
Payer: COMMERCIAL

## 2024-10-07 VITALS
SYSTOLIC BLOOD PRESSURE: 116 MMHG | OXYGEN SATURATION: 97 % | RESPIRATION RATE: 14 BRPM | HEIGHT: 63 IN | DIASTOLIC BLOOD PRESSURE: 72 MMHG | HEART RATE: 91 BPM | BODY MASS INDEX: 26.93 KG/M2 | WEIGHT: 152 LBS | TEMPERATURE: 98.6 F

## 2024-10-07 VITALS
TEMPERATURE: 98.6 F | DIASTOLIC BLOOD PRESSURE: 84 MMHG | OXYGEN SATURATION: 98 % | BODY MASS INDEX: 26.93 KG/M2 | WEIGHT: 152 LBS | SYSTOLIC BLOOD PRESSURE: 116 MMHG | HEART RATE: 91 BPM

## 2024-10-07 DIAGNOSIS — R05.9 COUGH, UNSPECIFIED TYPE: Primary | ICD-10-CM

## 2024-10-07 DIAGNOSIS — J18.9 PNEUMONIA OF LEFT LOWER LOBE DUE TO INFECTIOUS ORGANISM: Primary | ICD-10-CM

## 2024-10-07 LAB
ALBUMIN SERPL BCP-MCNC: 4.1 G/DL (ref 3.4–5)
ALP SERPL-CCNC: 58 U/L (ref 33–110)
ALT SERPL W P-5'-P-CCNC: 18 U/L (ref 7–45)
ANION GAP SERPL CALC-SCNC: 17 MMOL/L (ref 10–20)
APPEARANCE UR: CLEAR
AST SERPL W P-5'-P-CCNC: 28 U/L (ref 9–39)
BASOPHILS # BLD AUTO: 0.02 X10*3/UL (ref 0–0.1)
BASOPHILS NFR BLD AUTO: 0.4 %
BILIRUB SERPL-MCNC: 0.8 MG/DL (ref 0–1.2)
BILIRUB UR STRIP.AUTO-MCNC: NEGATIVE MG/DL
BNP SERPL-MCNC: 15 PG/ML (ref 0–99)
BUN SERPL-MCNC: 8 MG/DL (ref 6–23)
CALCIUM SERPL-MCNC: 9.1 MG/DL (ref 8.6–10.3)
CARDIAC TROPONIN I PNL SERPL HS: <3 NG/L (ref 0–13)
CARDIAC TROPONIN I PNL SERPL HS: <3 NG/L (ref 0–13)
CHLORIDE SERPL-SCNC: 101 MMOL/L (ref 98–107)
CO2 SERPL-SCNC: 22 MMOL/L (ref 21–32)
COLOR UR: COLORLESS
CREAT SERPL-MCNC: 0.59 MG/DL (ref 0.5–1.05)
D DIMER PPP FEU-MCNC: 1193 NG/ML FEU
EGFRCR SERPLBLD CKD-EPI 2021: >90 ML/MIN/1.73M*2
EOSINOPHIL # BLD AUTO: 0.06 X10*3/UL (ref 0–0.7)
EOSINOPHIL NFR BLD AUTO: 1.3 %
ERYTHROCYTE [DISTWIDTH] IN BLOOD BY AUTOMATED COUNT: 12.5 % (ref 11.5–14.5)
FLUAV RNA RESP QL NAA+PROBE: NOT DETECTED
FLUBV RNA RESP QL NAA+PROBE: NOT DETECTED
GLUCOSE SERPL-MCNC: 88 MG/DL (ref 74–99)
GLUCOSE UR STRIP.AUTO-MCNC: NORMAL MG/DL
HCG UR QL IA.RAPID: NEGATIVE
HCT VFR BLD AUTO: 39.6 % (ref 36–46)
HGB BLD-MCNC: 13.7 G/DL (ref 12–16)
IMM GRANULOCYTES # BLD AUTO: 0.01 X10*3/UL (ref 0–0.7)
IMM GRANULOCYTES NFR BLD AUTO: 0.2 % (ref 0–0.9)
INR PPP: 1.1 (ref 0.9–1.1)
KETONES UR STRIP.AUTO-MCNC: ABNORMAL MG/DL
LACTATE SERPL-SCNC: 0.7 MMOL/L (ref 0.4–2)
LEUKOCYTE ESTERASE UR QL STRIP.AUTO: NEGATIVE
LYMPHOCYTES # BLD AUTO: 1.22 X10*3/UL (ref 1.2–4.8)
LYMPHOCYTES NFR BLD AUTO: 26.2 %
MAGNESIUM SERPL-MCNC: 2.02 MG/DL (ref 1.6–2.4)
MCH RBC QN AUTO: 29.5 PG (ref 26–34)
MCHC RBC AUTO-ENTMCNC: 34.6 G/DL (ref 32–36)
MCV RBC AUTO: 85 FL (ref 80–100)
MONOCYTES # BLD AUTO: 0.46 X10*3/UL (ref 0.1–1)
MONOCYTES NFR BLD AUTO: 9.9 %
NEUTROPHILS # BLD AUTO: 2.88 X10*3/UL (ref 1.2–7.7)
NEUTROPHILS NFR BLD AUTO: 62 %
NITRITE UR QL STRIP.AUTO: NEGATIVE
NRBC BLD-RTO: 0 /100 WBCS (ref 0–0)
PH UR STRIP.AUTO: 6 [PH]
PLATELET # BLD AUTO: 145 X10*3/UL (ref 150–450)
POC RAPID STREP: NEGATIVE
POC SARS-COV-2 AG BINAX: NORMAL
POTASSIUM SERPL-SCNC: 3.5 MMOL/L (ref 3.5–5.3)
PROT SERPL-MCNC: 6.9 G/DL (ref 6.4–8.2)
PROT UR STRIP.AUTO-MCNC: NEGATIVE MG/DL
PROTHROMBIN TIME: 12.1 SECONDS (ref 9.8–12.8)
RBC # BLD AUTO: 4.65 X10*6/UL (ref 4–5.2)
RBC # UR STRIP.AUTO: NEGATIVE /UL
SARS-COV-2 RNA RESP QL NAA+PROBE: NOT DETECTED
SODIUM SERPL-SCNC: 136 MMOL/L (ref 136–145)
SP GR UR STRIP.AUTO: 1
UROBILINOGEN UR STRIP.AUTO-MCNC: NORMAL MG/DL
WBC # BLD AUTO: 4.7 X10*3/UL (ref 4.4–11.3)

## 2024-10-07 PROCEDURE — 81025 URINE PREGNANCY TEST: CPT | Performed by: HEALTH CARE PROVIDER

## 2024-10-07 PROCEDURE — 84484 ASSAY OF TROPONIN QUANT: CPT | Performed by: HEALTH CARE PROVIDER

## 2024-10-07 PROCEDURE — 2500000001 HC RX 250 WO HCPCS SELF ADMINISTERED DRUGS (ALT 637 FOR MEDICARE OP): Performed by: HEALTH CARE PROVIDER

## 2024-10-07 PROCEDURE — 87636 SARSCOV2 & INF A&B AMP PRB: CPT | Performed by: HEALTH CARE PROVIDER

## 2024-10-07 PROCEDURE — 84075 ASSAY ALKALINE PHOSPHATASE: CPT | Performed by: HEALTH CARE PROVIDER

## 2024-10-07 PROCEDURE — 85025 COMPLETE CBC W/AUTO DIFF WBC: CPT | Performed by: HEALTH CARE PROVIDER

## 2024-10-07 PROCEDURE — 93005 ELECTROCARDIOGRAM TRACING: CPT

## 2024-10-07 PROCEDURE — 96367 TX/PROPH/DG ADDL SEQ IV INF: CPT

## 2024-10-07 PROCEDURE — 81003 URINALYSIS AUTO W/O SCOPE: CPT | Performed by: HEALTH CARE PROVIDER

## 2024-10-07 PROCEDURE — 87880 STREP A ASSAY W/OPTIC: CPT

## 2024-10-07 PROCEDURE — 36415 COLL VENOUS BLD VENIPUNCTURE: CPT | Performed by: HEALTH CARE PROVIDER

## 2024-10-07 PROCEDURE — 96375 TX/PRO/DX INJ NEW DRUG ADDON: CPT

## 2024-10-07 PROCEDURE — 85379 FIBRIN DEGRADATION QUANT: CPT | Performed by: HEALTH CARE PROVIDER

## 2024-10-07 PROCEDURE — 71275 CT ANGIOGRAPHY CHEST: CPT

## 2024-10-07 PROCEDURE — 83880 ASSAY OF NATRIURETIC PEPTIDE: CPT | Performed by: HEALTH CARE PROVIDER

## 2024-10-07 PROCEDURE — 83605 ASSAY OF LACTIC ACID: CPT | Performed by: HEALTH CARE PROVIDER

## 2024-10-07 PROCEDURE — 2500000004 HC RX 250 GENERAL PHARMACY W/ HCPCS (ALT 636 FOR OP/ED): Performed by: HEALTH CARE PROVIDER

## 2024-10-07 PROCEDURE — 87811 SARS-COV-2 COVID19 W/OPTIC: CPT

## 2024-10-07 PROCEDURE — 71045 X-RAY EXAM CHEST 1 VIEW: CPT | Mod: FOREIGN READ | Performed by: RADIOLOGY

## 2024-10-07 PROCEDURE — 96365 THER/PROPH/DIAG IV INF INIT: CPT

## 2024-10-07 PROCEDURE — 71045 X-RAY EXAM CHEST 1 VIEW: CPT

## 2024-10-07 PROCEDURE — 85610 PROTHROMBIN TIME: CPT | Performed by: HEALTH CARE PROVIDER

## 2024-10-07 PROCEDURE — 2550000001 HC RX 255 CONTRASTS: Performed by: HEALTH CARE PROVIDER

## 2024-10-07 PROCEDURE — 99213 OFFICE O/P EST LOW 20 MIN: CPT

## 2024-10-07 PROCEDURE — 1036F TOBACCO NON-USER: CPT

## 2024-10-07 PROCEDURE — 71275 CT ANGIOGRAPHY CHEST: CPT | Mod: FOREIGN READ | Performed by: RADIOLOGY

## 2024-10-07 PROCEDURE — 99285 EMERGENCY DEPT VISIT HI MDM: CPT

## 2024-10-07 PROCEDURE — 96361 HYDRATE IV INFUSION ADD-ON: CPT

## 2024-10-07 PROCEDURE — 83735 ASSAY OF MAGNESIUM: CPT | Performed by: HEALTH CARE PROVIDER

## 2024-10-07 RX ORDER — DOXYCYCLINE 100 MG/1
100 CAPSULE ORAL 2 TIMES DAILY
Qty: 20 CAPSULE | Refills: 0 | Status: SHIPPED | OUTPATIENT
Start: 2024-10-07 | End: 2024-10-09 | Stop reason: HOSPADM

## 2024-10-07 RX ORDER — CEFTRIAXONE 2 G/50ML
2 INJECTION, SOLUTION INTRAVENOUS ONCE
Status: COMPLETED | OUTPATIENT
Start: 2024-10-07 | End: 2024-10-07

## 2024-10-07 RX ORDER — ACETAMINOPHEN 325 MG/1
975 TABLET ORAL ONCE
Status: COMPLETED | OUTPATIENT
Start: 2024-10-07 | End: 2024-10-07

## 2024-10-07 RX ORDER — AMOXICILLIN AND CLAVULANATE POTASSIUM 875; 125 MG/1; MG/1
1 TABLET, FILM COATED ORAL EVERY 12 HOURS
Qty: 14 TABLET | Refills: 0 | Status: SHIPPED | OUTPATIENT
Start: 2024-10-07 | End: 2024-10-14

## 2024-10-07 RX ORDER — ONDANSETRON HYDROCHLORIDE 2 MG/ML
4 INJECTION, SOLUTION INTRAVENOUS ONCE
Status: COMPLETED | OUTPATIENT
Start: 2024-10-07 | End: 2024-10-07

## 2024-10-07 RX ORDER — KETOROLAC TROMETHAMINE 15 MG/ML
15 INJECTION, SOLUTION INTRAMUSCULAR; INTRAVENOUS ONCE
Status: COMPLETED | OUTPATIENT
Start: 2024-10-07 | End: 2024-10-07

## 2024-10-07 ASSESSMENT — PAIN DESCRIPTION - DIRECTION: RADIATING_TOWARDS: BODY ACHES

## 2024-10-07 ASSESSMENT — LIFESTYLE VARIABLES
EVER FELT BAD OR GUILTY ABOUT YOUR DRINKING: NO
TOTAL SCORE: 0
EVER HAD A DRINK FIRST THING IN THE MORNING TO STEADY YOUR NERVES TO GET RID OF A HANGOVER: NO
HAVE YOU EVER FELT YOU SHOULD CUT DOWN ON YOUR DRINKING: NO
HAVE PEOPLE ANNOYED YOU BY CRITICIZING YOUR DRINKING: NO

## 2024-10-07 ASSESSMENT — PAIN SCALES - GENERAL: PAINLEVEL_OUTOF10: 3

## 2024-10-07 ASSESSMENT — ENCOUNTER SYMPTOMS: COUGH: 1

## 2024-10-07 ASSESSMENT — PAIN - FUNCTIONAL ASSESSMENT: PAIN_FUNCTIONAL_ASSESSMENT: 0-10

## 2024-10-07 NOTE — ED TRIAGE NOTES
Pt c/o fever, chills, cough, congestion, tachycardic and body aches. Pt had recent travels on an airplane to [place].

## 2024-10-07 NOTE — PROGRESS NOTES
Subjective   Patient ID: Karen Thomson is a 39 y.o. female. They present today with a chief complaint of Cough and Chills (For 2 days).    History of Present Illness  Patient reports she just flew to [place] and started fever, cough and chills, she flew home this weekend and she has been coughing and her heart rate has been elevated, denies chest pain or shortness of breath.        Cough        Past Medical History  Allergies as of 10/07/2024 - Reviewed 07/30/2024   Allergen Reaction Noted    Progesterone Hives 04/19/2023       (Not in a hospital admission)       Past Medical History:   Diagnosis Date    Acute pharyngitis, unspecified 08/23/2019    Pharyngitis with viral syndrome    Acute upper respiratory infection, unspecified 10/23/2016    Acute URI    Adjustment disorder with depressed mood 11/25/2019    Adjustment disorder with depressed mood    Anemia complicating pregnancy, unspecified trimester ([facility]) 03/16/2016    Anemia in pregnancy    Anxiety disorder, unspecified 11/25/2019    Acute anxiety    Body mass index (BMI) 39.0-39.9, adult 04/26/2021    Body mass index (BMI) of 39.0 to 39.9 in adult    Change in bowel habit 01/13/2015    Change in bowel habits    Chronic sinusitis, unspecified 05/11/2017    Recurrent sinusitis    Class 1 obesity with body mass index (BMI) of 33.0 to 33.9 in adult 11/08/2023    Diarrhea, unspecified 11/02/2021    Acute diarrhea    Disease of upper respiratory tract, unspecified 11/02/2021    Upper respiratory disease    Dry eye syndrome of bilateral lacrimal glands 01/16/2017    Bilateral dry eyes    Encounter for routine postpartum follow-up 03/16/2016    Routine postpartum follow-up    Encounter for screening for human immunodeficiency virus (HIV) 04/27/2015    Encounter for screening for HIV    Encounter for screening for other infectious and parasitic diseases 04/27/2015    Special screening examination for other specified chlamydial diseases    Encounter for  screening for other viral diseases 2015    Encounter for screening for other viral diseases    Fever, unspecified 2021    Fever and chills    Headache, unspecified 2017    Severe headache    History of  section 2020    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    Decreased milk production    Localized enlarged lymph nodes 10/20/2016    Anterior cervical adenopathy    Low vitamin D level 2023    Metrorrhagia 2023    Obesity, unspecified 10/25/2021    Class 2 obesity with body mass index (BMI) of 37.0 to 37.9 in adult    Obesity, unspecified 2021    Class 2 obesity with body mass index (BMI) of 36.0 to 36.9 in adult    Ocular pain, left eye 2017    Acute left eye pain    Ocular pain, left eye 2017    Pain of left eye    Other abnormal and inconclusive findings on diagnostic imaging of breast 2017    Abnormal ultrasound of breast    Other conditions influencing health status 2017    History of cough    Other specified health status     No pertinent past medical history    Other specified noninflammatory disorders of vagina 08/15/2018    Vaginal cyst    Other specified noninflammatory disorders of vagina     Vaginal odor    Palpitations 2015    Palpitations    Personal history of diseases of the skin and subcutaneous tissue 2017    History of skin pruritus    Personal history of diseases of the skin and subcutaneous tissue 2018    History of cyst of breast    Personal history of other complications of pregnancy, childbirth and the puerperium 2020    History of postpartum depression    Personal history of other diseases of the female genital tract 2015    History of female infertility    Personal history of other diseases of the female genital tract 2018    History of breast pain    Personal history of other diseases of the nervous system and sense organs 2021    History of conjunctivitis    Personal history of  other diseases of the respiratory system 2019    History of sore throat    Personal history of other diseases of the respiratory system 2020    History of acute pharyngitis    Personal history of other diseases of the respiratory system 2017    History of sinusitis    Personal history of other diseases of the respiratory system 2019    History of influenza    Personal history of other diseases of urinary system 2021    H/O acute pyelonephritis    Personal history of other endocrine, nutritional and metabolic disease 2021    History of obesity    Personal history of other infectious and parasitic diseases 2019    History of viral gastroenteritis    Personal history of other specified conditions 2017    History of diarrhea    Personal history of other specified conditions 2021    History of fatigue    Personal history of other specified conditions 2015    History of diarrhea    Personal history of other specified conditions 2015    History of abdominal pain    Personal history of other specified conditions 10/01/2015    History of urinary frequency    Personal history of pneumonia (recurrent) 2021    History of recurrent pneumonia    Postpartum depression 2016    Post partum depression    Postpartum depression 2016    Postpartum depression    Procreative counseling and advice using natural family planning 2014    Procreative counseling and advice using natural family planning    Rash and other nonspecific skin eruption 2017    Rash    Unspecified acute conjunctivitis, bilateral 2020    Acute bacterial conjunctivitis of both eyes       Past Surgical History:   Procedure Laterality Date    OTHER SURGICAL HISTORY  2018    Laparoscopic Aspiration Uterine Mass    OTHER SURGICAL HISTORY  2020     section    TONSILLECTOMY  2015    Tonsillectomy        reports that she has never smoked. She has never  used smokeless tobacco. She reports current alcohol use. She reports that she does not use drugs.    Review of Systems  Review of Systems   Respiratory:  Positive for cough.    All other systems reviewed and are negative.                                 Objective    Vitals:    10/07/24 1624   BP: 116/84   BP Location: Left arm   Patient Position: Sitting   BP Cuff Size: Adult   Pulse: 91   Temp: 37 °C (98.6 °F)   TempSrc: Oral   SpO2: 98%   Weight: 68.9 kg (152 lb)     No LMP recorded.    Physical Exam  Vitals reviewed.   Constitutional:       Appearance: Normal appearance.   HENT:      Head: Normocephalic and atraumatic.      Right Ear: Tympanic membrane, ear canal and external ear normal.      Left Ear: Tympanic membrane, ear canal and external ear normal.      Nose: Nose normal.      Mouth/Throat:      Mouth: Mucous membranes are dry.      Pharynx: Oropharynx is clear.   Eyes:      Extraocular Movements: Extraocular movements intact.      Conjunctiva/sclera: Conjunctivae normal.      Pupils: Pupils are equal, round, and reactive to light.   Cardiovascular:      Rate and Rhythm: Normal rate.      Pulses: Normal pulses.      Heart sounds: Normal heart sounds.   Pulmonary:      Effort: Pulmonary effort is normal.      Breath sounds: Normal breath sounds.   Musculoskeletal:         General: Normal range of motion.      Cervical back: Normal range of motion.   Skin:     General: Skin is warm and dry.      Capillary Refill: Capillary refill takes less than 2 seconds.   Neurological:      General: No focal deficit present.      Mental Status: She is alert and oriented to person, place, and time.   Psychiatric:         Mood and Affect: Mood normal.         Behavior: Behavior normal.         Procedures    Point of Care Test & Imaging Results from this visit  Results for orders placed or performed in visit on 10/07/24   POCT Covid-19 Rapid Antigen   Result Value Ref Range    POC KARTHIK-COV-2 AG  Presumptive negative test for  SARS-CoV-2 (no antigen detected)     Presumptive negative test for SARS-CoV-2 (no antigen detected)   POCT rapid strep A manually resulted   Result Value Ref Range    POC Rapid Strep Negative Negative      No results found.    Diagnostic study results (if any) were reviewed by LAUREN Gonsales.    Assessment/Plan   Allergies, medications, history, and pertinent labs/EKGs/Imaging reviewed by LAUREN Gonsales.     Medical Decision Making  Patient in NAD, VSS, HRR, lungs clear.   Reports she has had fever and chills since going to [place] and [facility] back this weekend, reports her heart rate was elevated at school from [employer], does admit to coughing.  On exam she is non toxic appearing, mild cough, throat no erythema, patent airway.  Lungs clear bilaterally.  Pulse Ox 98%.  Patient has had recent travel, is in NAD now, she is to go to ED for further management, patient agrees with plan of care.    Denies chest or back pain.    Patient agrees with plan of care.    Strep and covid negative    Orders and Diagnoses  Diagnoses and all orders for this visit:  Cough, unspecified type  -     POCT Covid-19 Rapid Antigen  -     POCT rapid strep A manually resulted      Medical Admin Record      Patient disposition: ED    Electronically signed by LAUREN Gonsales  4:43 PM

## 2024-10-07 NOTE — ED TRIAGE NOTES
Patient came in with flu like symptoms, recently was in [facility] for vacation, heart rate elevated, SOB and a slight fever at home, patient always feeling lightheaded, went to urgent care and was told to come here.

## 2024-10-07 NOTE — LETTER
October 7, 2024    Patient: Karen Thomson   YOB: 1985   Date of Visit: 10/7/2024       To Whom It May Concern:    Karen Thomson was seen and treated in our emergency department on 10/7/2024. She can return to work on   10/14/24  If you have any questions or concerns, please don't hesitate to call.              CC:   No Recipients

## 2024-10-07 NOTE — ED PROVIDER NOTES
HPI   Chief Complaint   Patient presents with    Flu Symptoms     Pt c/o fever, chills, cough, congestion, tachycardic and body aches. Pt had recent travels on an airplane to [place].        CC: Tachycardia, chills, fevers, shortness of breath, cough  HPI:   This is a 39-year-old female presents ED complaining of cough, chills, fevers, tachycardia, she also notes nausea vomiting, patient reports recently traveling to [place] 3 days ago, notes that she felt fine prior to travel, when she arrived she noted symptoms started with cough, chills, subjective fevers, return to following day still continues to have chills fevers cough denies any hemoptysis or hematemesis, denies any tobacco use, no history of COPD, CHF, asthma.  She describes tightness in the anterior chest, currently on Ozempic for weight loss.    Additional Limitations to History:   External Records Reviewed: I reviewed recent and relevant outside records including   History Obtained From:     Past Medical History: Per HPI  Medications: Reviewed in EMR and with patient  Allergies:  Reviewed in EMR  Past Surgical History:   Social History:     ------------------------------------------------------------------------------------------------------  Physical Exam:  --Vital signs reviewed in nursing triage note, EMR flow sheets, and at patient's bedside  GEN:  A&Ox3, no acute distress, appears comfortable.  Conversational and appropriate.  No confusion or gross mental status changes.  EYES: EOMI, non-injected sclera.  ENT: Moist mucous membranes, no apparent injuries or lesions.   CARDIO: Sinus tachycardia sinus tachycardia ventricular rate no murmurs, rubs, or gallops.  2+ equal pulses of the distal extremities.   PULM: Clear to auscultation bilaterally. No rales, rhonchi, or wheezes. Good symmetric chest expansion.  GI: Soft, non-tender, non-distended. No rebound tenderness or guarding.  SKIN: Warm and dry, no rashes or lesions.  MSK: ROM intact the  extremities without contractures.   EXT: No peripheral edema, contusions, or wounds.   NEURO: Cranial nerves II-XII grossly intact. Sensation to light touch intact and equal bilaterally in upper and lower extremities.  Symmetric 5/5 strength in upper and lower extremities.  PSYCH: Appropriate mood and behavior, converses and responds appropriately during exam.  -------------------------------------------------------------------------------------------------------        Differential Diagnoses Considered:   Chronic Medical Conditions Significantly Affecting Care:   Diagnostic testing considered: [PERC, D-Dimer, PECARN, etc.]    - EKG interpreted by myself 110 ID interval 146 normal QRS duration of prolonged QT/QTc no obvious ST elevation, depression, T wave version or acute ischemic findings.  - I independently interpreted: [CXR, CT, POCUS, etc. including your interpretation]  - Labs notable for     Escalation of Care: Appropriate for   Social Determinants of Health Significantly Affecting Care: [Homelessness, lacking transportation, uninsured, unable to afford medications]  Prescription Drug Consideration: [Antibiotics, antivirals, pain medications, etc.]  Discussion of Management with Other Providers:  I discussed the patient/results with: [admitting team, consultant, radiologist, social work, EPAT, case management, PT/OT, RT, PCP, etc.]      Prasanth Danielson PA-C              Patient History   Past Medical History:   Diagnosis Date    Acute pharyngitis, unspecified 08/23/2019    Pharyngitis with viral syndrome    Acute upper respiratory infection, unspecified 10/23/2016    Acute URI    Adjustment disorder with depressed mood 11/25/2019    Adjustment disorder with depressed mood    Anemia complicating pregnancy, unspecified trimester ([facility]) 03/16/2016    Anemia in pregnancy    Anxiety disorder, unspecified 11/25/2019    Acute anxiety    Body mass index (BMI) 39.0-39.9, adult 04/26/2021    Body mass index (BMI) of  39.0 to 39.9 in adult    Change in bowel habit 2015    Change in bowel habits    Chronic sinusitis, unspecified 2017    Recurrent sinusitis    Class 1 obesity with body mass index (BMI) of 33.0 to 33.9 in adult 2023    Diarrhea, unspecified 2021    Acute diarrhea    Disease of upper respiratory tract, unspecified 2021    Upper respiratory disease    Dry eye syndrome of bilateral lacrimal glands 2017    Bilateral dry eyes    Encounter for routine postpartum follow-up 2016    Routine postpartum follow-up    Encounter for screening for human immunodeficiency virus (HIV) 2015    Encounter for screening for HIV    Encounter for screening for other infectious and parasitic diseases 2015    Special screening examination for other specified chlamydial diseases    Encounter for screening for other viral diseases 2015    Encounter for screening for other viral diseases    Fever, unspecified 2021    Fever and chills    Headache, unspecified 2017    Severe headache    History of  section 2020    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    Decreased milk production    Localized enlarged lymph nodes 10/20/2016    Anterior cervical adenopathy    Low vitamin D level 2023    Metrorrhagia 2023    Obesity, unspecified 10/25/2021    Class 2 obesity with body mass index (BMI) of 37.0 to 37.9 in adult    Obesity, unspecified 2021    Class 2 obesity with body mass index (BMI) of 36.0 to 36.9 in adult    Ocular pain, left eye 2017    Acute left eye pain    Ocular pain, left eye 2017    Pain of left eye    Other abnormal and inconclusive findings on diagnostic imaging of breast 2017    Abnormal ultrasound of breast    Other conditions influencing health status 2017    History of cough    Other specified health status     No pertinent past medical history    Other specified noninflammatory disorders of vagina 08/15/2018     Vaginal cyst    Other specified noninflammatory disorders of vagina     Vaginal odor    Palpitations 01/13/2015    Palpitations    Personal history of diseases of the skin and subcutaneous tissue 11/22/2017    History of skin pruritus    Personal history of diseases of the skin and subcutaneous tissue 04/05/2018    History of cyst of breast    Personal history of other complications of pregnancy, childbirth and the puerperium 09/11/2020    History of postpartum depression    Personal history of other diseases of the female genital tract 06/27/2015    History of female infertility    Personal history of other diseases of the female genital tract 04/05/2018    History of breast pain    Personal history of other diseases of the nervous system and sense organs 11/08/2021    History of conjunctivitis    Personal history of other diseases of the respiratory system 08/23/2019    History of sore throat    Personal history of other diseases of the respiratory system 03/02/2020    History of acute pharyngitis    Personal history of other diseases of the respiratory system 05/14/2017    History of sinusitis    Personal history of other diseases of the respiratory system 03/20/2019    History of influenza    Personal history of other diseases of urinary system 04/26/2021    H/O acute pyelonephritis    Personal history of other endocrine, nutritional and metabolic disease 06/02/2021    History of obesity    Personal history of other infectious and parasitic diseases 08/23/2019    History of viral gastroenteritis    Personal history of other specified conditions 11/22/2017    History of diarrhea    Personal history of other specified conditions 12/02/2021    History of fatigue    Personal history of other specified conditions 01/13/2015    History of diarrhea    Personal history of other specified conditions 01/13/2015    History of abdominal pain    Personal history of other specified conditions 10/01/2015    History of urinary  frequency    Personal history of pneumonia (recurrent) 2021    History of recurrent pneumonia    Postpartum depression 2016    Post partum depression    Postpartum depression 2016    Postpartum depression    Procreative counseling and advice using natural family planning 2014    Procreative counseling and advice using natural family planning    Rash and other nonspecific skin eruption 2017    Rash    Unspecified acute conjunctivitis, bilateral 2020    Acute bacterial conjunctivitis of both eyes     Past Surgical History:   Procedure Laterality Date    OTHER SURGICAL HISTORY  2018    Laparoscopic Aspiration Uterine Mass    OTHER SURGICAL HISTORY  2020     section    TONSILLECTOMY  2015    Tonsillectomy     Family History   Problem Relation Name Age of Onset    Other (cardiac disorder) Mother      Other (cardiac disorder) Father      Other (cardiac disorder) Maternal Grandmother      Diabetes Maternal Grandmother      Cancer Maternal Grandmother      Other (cardiac disorder) Paternal Grandmother      Diabetes Paternal Grandmother      Cancer Paternal Grandmother       Social History     Tobacco Use    Smoking status: Never    Smokeless tobacco: Never   Vaping Use    Vaping status: Never Used   Substance Use Topics    Alcohol use: Yes     Comment: socially    Drug use: Never       Physical Exam   ED Triage Vitals   Temperature Heart Rate Respirations BP   10/07/24 1721 10/07/24 1721 10/07/24 1721 10/07/24 1721   37.4 °C (99.3 °F) (!) 109 18 133/72      Pulse Ox Temp src Heart Rate Source Patient Position   10/07/24 1721 -- -- 10/07/24 1728   99 %   Lying      BP Location FiO2 (%)     10/07/24 1728 --     Left arm        Physical Exam      ED Course & MDM   Diagnoses as of 10/09/24 1341   Pneumonia of left lower lobe due to infectious organism                 No data recorded     Oscar Coma Scale Score: 15 (10/07/24 1724 : Alyssa Joe RN)                            Medical Decision Making  39-year-old female with complaints of tachycardia, fever, chills, shortness of breath, cough likely in the setting of left lower lobe pneumonia, patient was given Rocephin, azithromycin, and heart rate has significantly improved, is 100% on room air, now afebrile, hemodynamically stable nontoxic-appearing, pending blood cultures however low suspicion for bacteremia, normal lactate and no leukocytosis, patient did have elevated D-dimer, CT chest was negative for pulmonary emboli, long discussion with patient, she has a pulse ox at home and reports feeling comfortable being discharged she will use her pulse ox and she was advised if it drops below 90 she needs to return to the emergency room immediately.  Will keep her on Augmentin, doxycycline, and advised close follow-up with PCP or return to ED if symptoms worsen.        Procedure  Procedures     Prasanth Danielson PA-C  10/07/24 1800       Prasanth Danielson PA-C  10/09/24 [id number]

## 2024-10-08 ENCOUNTER — TELEPHONE (OUTPATIENT)
Dept: PRIMARY CARE | Facility: CLINIC | Age: 39
End: 2024-10-08
Payer: COMMERCIAL

## 2024-10-08 ENCOUNTER — HOSPITAL ENCOUNTER (OUTPATIENT)
Facility: HOSPITAL | Age: 39
Setting detail: OBSERVATION
Discharge: HOME | End: 2024-10-09
Attending: EMERGENCY MEDICINE | Admitting: INTERNAL MEDICINE
Payer: COMMERCIAL

## 2024-10-08 ENCOUNTER — APPOINTMENT (OUTPATIENT)
Dept: CARDIOLOGY | Facility: HOSPITAL | Age: 39
End: 2024-10-08
Payer: COMMERCIAL

## 2024-10-08 DIAGNOSIS — F41.9 ANXIETY: ICD-10-CM

## 2024-10-08 DIAGNOSIS — A18.01 POTT DISEASE: ICD-10-CM

## 2024-10-08 DIAGNOSIS — T39.1X1A ACCIDENTAL ACETAMINOPHEN OVERDOSE, INITIAL ENCOUNTER: Primary | ICD-10-CM

## 2024-10-08 DIAGNOSIS — J30.2 SEASONAL ALLERGIES: ICD-10-CM

## 2024-10-08 LAB
ALBUMIN SERPL BCP-MCNC: 4 G/DL (ref 3.4–5)
ALP SERPL-CCNC: 62 U/L (ref 33–110)
ALT SERPL W P-5'-P-CCNC: 19 U/L (ref 7–45)
AMPHETAMINES UR QL SCN: NORMAL
ANION GAP SERPL CALC-SCNC: 16 MMOL/L (ref 10–20)
APAP SERPL-MCNC: 23 UG/ML
APTT PPP: 30 SECONDS (ref 27–38)
AST SERPL W P-5'-P-CCNC: 27 U/L (ref 9–39)
B-HCG SERPL-ACNC: <2 MIU/ML
BARBITURATES UR QL SCN: NORMAL
BASOPHILS # BLD AUTO: 0.02 X10*3/UL (ref 0–0.1)
BASOPHILS NFR BLD AUTO: 0.5 %
BENZODIAZ UR QL SCN: NORMAL
BILIRUB SERPL-MCNC: 0.5 MG/DL (ref 0–1.2)
BUN SERPL-MCNC: 7 MG/DL (ref 6–23)
BZE UR QL SCN: NORMAL
CALCIUM SERPL-MCNC: 9.1 MG/DL (ref 8.6–10.3)
CANNABINOIDS UR QL SCN: NORMAL
CHLORIDE SERPL-SCNC: 103 MMOL/L (ref 98–107)
CO2 SERPL-SCNC: 24 MMOL/L (ref 21–32)
CREAT SERPL-MCNC: 0.6 MG/DL (ref 0.5–1.05)
EGFRCR SERPLBLD CKD-EPI 2021: >90 ML/MIN/1.73M*2
EOSINOPHIL # BLD AUTO: 0.13 X10*3/UL (ref 0–0.7)
EOSINOPHIL NFR BLD AUTO: 3.2 %
ERYTHROCYTE [DISTWIDTH] IN BLOOD BY AUTOMATED COUNT: 12.4 % (ref 11.5–14.5)
ETHANOL SERPL-MCNC: <10 MG/DL
FENTANYL+NORFENTANYL UR QL SCN: NORMAL
GLUCOSE SERPL-MCNC: 90 MG/DL (ref 74–99)
HCT VFR BLD AUTO: 38.8 % (ref 36–46)
HGB BLD-MCNC: 13 G/DL (ref 12–16)
HOLD SPECIMEN: NORMAL
IMM GRANULOCYTES # BLD AUTO: 0.01 X10*3/UL (ref 0–0.7)
IMM GRANULOCYTES NFR BLD AUTO: 0.2 % (ref 0–0.9)
INR PPP: 1 (ref 0.9–1.1)
LACTATE SERPL-SCNC: 0.7 MMOL/L (ref 0.4–2)
LYMPHOCYTES # BLD AUTO: 1.32 X10*3/UL (ref 1.2–4.8)
LYMPHOCYTES NFR BLD AUTO: 32.4 %
MAGNESIUM SERPL-MCNC: 2.07 MG/DL (ref 1.6–2.4)
MCH RBC QN AUTO: 28.9 PG (ref 26–34)
MCHC RBC AUTO-ENTMCNC: 33.5 G/DL (ref 32–36)
MCV RBC AUTO: 86 FL (ref 80–100)
METHADONE UR QL SCN: NORMAL
MONOCYTES # BLD AUTO: 0.46 X10*3/UL (ref 0.1–1)
MONOCYTES NFR BLD AUTO: 11.3 %
NEUTROPHILS # BLD AUTO: 2.14 X10*3/UL (ref 1.2–7.7)
NEUTROPHILS NFR BLD AUTO: 52.4 %
NRBC BLD-RTO: 0 /100 WBCS (ref 0–0)
OPIATES UR QL SCN: NORMAL
OXYCODONE+OXYMORPHONE UR QL SCN: NORMAL
PCP UR QL SCN: NORMAL
PLATELET # BLD AUTO: 138 X10*3/UL (ref 150–450)
POTASSIUM SERPL-SCNC: 3.5 MMOL/L (ref 3.5–5.3)
PROT SERPL-MCNC: 7 G/DL (ref 6.4–8.2)
PROTHROMBIN TIME: 11.8 SECONDS (ref 9.8–12.8)
RBC # BLD AUTO: 4.5 X10*6/UL (ref 4–5.2)
RSV RNA RESP QL NAA+PROBE: NOT DETECTED
SALICYLATES SERPL-MCNC: <3 MG/DL
SODIUM SERPL-SCNC: 139 MMOL/L (ref 136–145)
WBC # BLD AUTO: 4.1 X10*3/UL (ref 4.4–11.3)

## 2024-10-08 PROCEDURE — 87040 BLOOD CULTURE FOR BACTERIA: CPT | Mod: 59,GEALAB

## 2024-10-08 PROCEDURE — 83735 ASSAY OF MAGNESIUM: CPT | Performed by: NURSE PRACTITIONER

## 2024-10-08 PROCEDURE — 36415 COLL VENOUS BLD VENIPUNCTURE: CPT | Performed by: NURSE PRACTITIONER

## 2024-10-08 PROCEDURE — 83605 ASSAY OF LACTIC ACID: CPT | Performed by: NURSE PRACTITIONER

## 2024-10-08 PROCEDURE — 96361 HYDRATE IV INFUSION ADD-ON: CPT

## 2024-10-08 PROCEDURE — 83520 IMMUNOASSAY QUANT NOS NONAB: CPT | Mod: 59

## 2024-10-08 PROCEDURE — 85025 COMPLETE CBC W/AUTO DIFF WBC: CPT | Performed by: NURSE PRACTITIONER

## 2024-10-08 PROCEDURE — 80053 COMPREHEN METABOLIC PANEL: CPT | Performed by: NURSE PRACTITIONER

## 2024-10-08 PROCEDURE — 80307 DRUG TEST PRSMV CHEM ANLYZR: CPT | Performed by: NURSE PRACTITIONER

## 2024-10-08 PROCEDURE — 99285 EMERGENCY DEPT VISIT HI MDM: CPT | Mod: 25

## 2024-10-08 PROCEDURE — 2500000001 HC RX 250 WO HCPCS SELF ADMINISTERED DRUGS (ALT 637 FOR MEDICARE OP)

## 2024-10-08 PROCEDURE — 96375 TX/PRO/DX INJ NEW DRUG ADDON: CPT

## 2024-10-08 PROCEDURE — 80143 DRUG ASSAY ACETAMINOPHEN: CPT | Performed by: NURSE PRACTITIONER

## 2024-10-08 PROCEDURE — 2500000004 HC RX 250 GENERAL PHARMACY W/ HCPCS (ALT 636 FOR OP/ED): Performed by: NURSE PRACTITIONER

## 2024-10-08 PROCEDURE — G0378 HOSPITAL OBSERVATION PER HR: HCPCS

## 2024-10-08 PROCEDURE — 87081 CULTURE SCREEN ONLY: CPT | Mod: GEALAB

## 2024-10-08 PROCEDURE — 87899 AGENT NOS ASSAY W/OPTIC: CPT | Mod: GEALAB

## 2024-10-08 PROCEDURE — 87634 RSV DNA/RNA AMP PROBE: CPT

## 2024-10-08 PROCEDURE — 84702 CHORIONIC GONADOTROPIN TEST: CPT | Performed by: NURSE PRACTITIONER

## 2024-10-08 PROCEDURE — 96365 THER/PROPH/DIAG IV INF INIT: CPT

## 2024-10-08 PROCEDURE — 87449 NOS EACH ORGANISM AG IA: CPT | Mod: GEALAB

## 2024-10-08 PROCEDURE — 85610 PROTHROMBIN TIME: CPT | Performed by: NURSE PRACTITIONER

## 2024-10-08 PROCEDURE — 93005 ELECTROCARDIOGRAM TRACING: CPT

## 2024-10-08 PROCEDURE — 99222 1ST HOSP IP/OBS MODERATE 55: CPT

## 2024-10-08 PROCEDURE — 85730 THROMBOPLASTIN TIME PARTIAL: CPT | Performed by: NURSE PRACTITIONER

## 2024-10-08 PROCEDURE — 2500000004 HC RX 250 GENERAL PHARMACY W/ HCPCS (ALT 636 FOR OP/ED)

## 2024-10-08 RX ORDER — POLYETHYLENE GLYCOL 3350 17 G/17G
17 POWDER, FOR SOLUTION ORAL DAILY
Status: DISCONTINUED | OUTPATIENT
Start: 2024-10-09 | End: 2024-10-09 | Stop reason: HOSPADM

## 2024-10-08 RX ORDER — KETOROLAC TROMETHAMINE 30 MG/ML
30 INJECTION, SOLUTION INTRAMUSCULAR; INTRAVENOUS ONCE
Status: COMPLETED | OUTPATIENT
Start: 2024-10-08 | End: 2024-10-08

## 2024-10-08 RX ORDER — DIPHENHYDRAMINE HYDROCHLORIDE 50 MG/ML
INJECTION INTRAMUSCULAR; INTRAVENOUS
Status: COMPLETED
Start: 2024-10-08 | End: 2024-10-08

## 2024-10-08 RX ORDER — AMOXICILLIN AND CLAVULANATE POTASSIUM 875; 125 MG/1; MG/1
1 TABLET, FILM COATED ORAL EVERY 12 HOURS SCHEDULED
Status: DISCONTINUED | OUTPATIENT
Start: 2024-10-08 | End: 2024-10-09 | Stop reason: HOSPADM

## 2024-10-08 RX ORDER — HYDROXYZINE HYDROCHLORIDE 25 MG/1
50 TABLET, FILM COATED ORAL ONCE
Status: COMPLETED | OUTPATIENT
Start: 2024-10-08 | End: 2024-10-08

## 2024-10-08 RX ORDER — ROSUVASTATIN CALCIUM 10 MG/1
5 TABLET, COATED ORAL DAILY
Status: DISCONTINUED | OUTPATIENT
Start: 2024-10-09 | End: 2024-10-09 | Stop reason: HOSPADM

## 2024-10-08 RX ORDER — DIPHENHYDRAMINE HYDROCHLORIDE 50 MG/ML
50 INJECTION INTRAMUSCULAR; INTRAVENOUS ONCE
Status: COMPLETED | OUTPATIENT
Start: 2024-10-08 | End: 2024-10-08

## 2024-10-08 RX ORDER — FAMOTIDINE 10 MG/ML
INJECTION INTRAVENOUS
Status: COMPLETED
Start: 2024-10-08 | End: 2024-10-08

## 2024-10-08 RX ORDER — DOXYCYCLINE HYCLATE 100 MG
100 TABLET ORAL 2 TIMES DAILY
Status: DISCONTINUED | OUTPATIENT
Start: 2024-10-08 | End: 2024-10-09

## 2024-10-08 RX ORDER — FAMOTIDINE 10 MG/ML
20 INJECTION INTRAVENOUS ONCE
Status: COMPLETED | OUTPATIENT
Start: 2024-10-08 | End: 2024-10-08

## 2024-10-08 SDOH — SOCIAL STABILITY: SOCIAL INSECURITY
WITHIN THE LAST YEAR, HAVE YOU BEEN KICKED, HIT, SLAPPED, OR OTHERWISE PHYSICALLY HURT BY YOUR PARTNER OR EX-PARTNER?: NO

## 2024-10-08 SDOH — SOCIAL STABILITY: SOCIAL INSECURITY: ARE THERE ANY APPARENT SIGNS OF INJURIES/BEHAVIORS THAT COULD BE RELATED TO ABUSE/NEGLECT?: NO

## 2024-10-08 SDOH — ECONOMIC STABILITY: FOOD INSECURITY: WITHIN THE PAST 12 MONTHS, YOU WORRIED THAT YOUR FOOD WOULD RUN OUT BEFORE YOU GOT MONEY TO BUY MORE.: NEVER TRUE

## 2024-10-08 SDOH — SOCIAL STABILITY: SOCIAL INSECURITY: WITHIN THE LAST YEAR, HAVE YOU BEEN AFRAID OF YOUR PARTNER OR EX-PARTNER?: NO

## 2024-10-08 SDOH — SOCIAL STABILITY: SOCIAL INSECURITY
WITHIN THE LAST YEAR, HAVE TO BEEN RAPED OR FORCED TO HAVE ANY KIND OF SEXUAL ACTIVITY BY YOUR PARTNER OR EX-PARTNER?: NO

## 2024-10-08 SDOH — SOCIAL STABILITY: SOCIAL INSECURITY: DO YOU FEEL ANYONE HAS EXPLOITED OR TAKEN ADVANTAGE OF YOU FINANCIALLY OR OF YOUR PERSONAL PROPERTY?: NO

## 2024-10-08 SDOH — SOCIAL STABILITY: SOCIAL INSECURITY: WERE YOU ABLE TO COMPLETE ALL THE BEHAVIORAL HEALTH SCREENINGS?: YES

## 2024-10-08 SDOH — SOCIAL STABILITY: SOCIAL INSECURITY: ARE YOU OR HAVE YOU BEEN THREATENED OR ABUSED PHYSICALLY, EMOTIONALLY, OR SEXUALLY BY ANYONE?: NO

## 2024-10-08 SDOH — SOCIAL STABILITY: SOCIAL INSECURITY: DOES ANYONE TRY TO KEEP YOU FROM HAVING/CONTACTING OTHER FRIENDS OR DOING THINGS OUTSIDE YOUR HOME?: NO

## 2024-10-08 SDOH — SOCIAL STABILITY: SOCIAL INSECURITY: WITHIN THE LAST YEAR, HAVE YOU BEEN HUMILIATED OR EMOTIONALLY ABUSED IN OTHER WAYS BY YOUR PARTNER OR EX-PARTNER?: NO

## 2024-10-08 SDOH — ECONOMIC STABILITY: INCOME INSECURITY: IN THE PAST 12 MONTHS, HAS THE ELECTRIC, GAS, OIL, OR WATER COMPANY THREATENED TO SHUT OFF SERVICE IN YOUR HOME?: NO

## 2024-10-08 SDOH — ECONOMIC STABILITY: FOOD INSECURITY: WITHIN THE PAST 12 MONTHS, THE FOOD YOU BOUGHT JUST DIDN'T LAST AND YOU DIDN'T HAVE MONEY TO GET MORE.: NEVER TRUE

## 2024-10-08 SDOH — SOCIAL STABILITY: SOCIAL INSECURITY: DO YOU FEEL UNSAFE GOING BACK TO THE PLACE WHERE YOU ARE LIVING?: NO

## 2024-10-08 SDOH — SOCIAL STABILITY: SOCIAL INSECURITY: HAS ANYONE EVER THREATENED TO HURT YOUR FAMILY OR YOUR PETS?: NO

## 2024-10-08 SDOH — SOCIAL STABILITY: SOCIAL INSECURITY: HAVE YOU HAD ANY THOUGHTS OF HARMING ANYONE ELSE?: NO

## 2024-10-08 SDOH — SOCIAL STABILITY: SOCIAL INSECURITY: HAVE YOU HAD THOUGHTS OF HARMING ANYONE ELSE?: NO

## 2024-10-08 SDOH — SOCIAL STABILITY: SOCIAL INSECURITY: ABUSE: ADULT

## 2024-10-08 ASSESSMENT — COLUMBIA-SUICIDE SEVERITY RATING SCALE - C-SSRS
2. HAVE YOU ACTUALLY HAD ANY THOUGHTS OF KILLING YOURSELF?: NO
1. IN THE PAST MONTH, HAVE YOU WISHED YOU WERE DEAD OR WISHED YOU COULD GO TO SLEEP AND NOT WAKE UP?: NO
6. HAVE YOU EVER DONE ANYTHING, STARTED TO DO ANYTHING, OR PREPARED TO DO ANYTHING TO END YOUR LIFE?: NO
6. HAVE YOU EVER DONE ANYTHING, STARTED TO DO ANYTHING, OR PREPARED TO DO ANYTHING TO END YOUR LIFE?: NO
1. IN THE PAST MONTH, HAVE YOU WISHED YOU WERE DEAD OR WISHED YOU COULD GO TO SLEEP AND NOT WAKE UP?: NO
2. HAVE YOU ACTUALLY HAD ANY THOUGHTS OF KILLING YOURSELF?: NO

## 2024-10-08 ASSESSMENT — COGNITIVE AND FUNCTIONAL STATUS - GENERAL
PATIENT BASELINE BEDBOUND: NO
DAILY ACTIVITIY SCORE: 24
MOBILITY SCORE: 24
DAILY ACTIVITIY SCORE: 24
MOBILITY SCORE: 24

## 2024-10-08 ASSESSMENT — ENCOUNTER SYMPTOMS
LIGHT-HEADEDNESS: 0
DIZZINESS: 1
MUSCULOSKELETAL NEGATIVE: 1
FATIGUE: 1
APPETITE CHANGE: 0
STRIDOR: 1
NERVOUS/ANXIOUS: 1
UNEXPECTED WEIGHT CHANGE: 0
AGITATION: 0
DYSPHORIC MOOD: 0
CHILLS: 1
PALPITATIONS: 1
NUMBNESS: 0
HEADACHES: 0
CHEST TIGHTNESS: 1
ACTIVITY CHANGE: 0
SLEEP DISTURBANCE: 0
FEVER: 1
DIAPHORESIS: 1
CHOKING: 1
GASTROINTESTINAL NEGATIVE: 1
SHORTNESS OF BREATH: 1

## 2024-10-08 ASSESSMENT — ACTIVITIES OF DAILY LIVING (ADL)
LACK_OF_TRANSPORTATION: NO
HEARING - RIGHT EAR: FUNCTIONAL
TOILETING: INDEPENDENT
FEEDING YOURSELF: INDEPENDENT
PATIENT'S MEMORY ADEQUATE TO SAFELY COMPLETE DAILY ACTIVITIES?: YES
GROOMING: INDEPENDENT
WALKS IN HOME: INDEPENDENT
DRESSING YOURSELF: INDEPENDENT
HEARING - LEFT EAR: FUNCTIONAL
ADEQUATE_TO_COMPLETE_ADL: YES
BATHING: INDEPENDENT
JUDGMENT_ADEQUATE_SAFELY_COMPLETE_DAILY_ACTIVITIES: YES

## 2024-10-08 ASSESSMENT — LIFESTYLE VARIABLES
HAVE YOU EVER FELT YOU SHOULD CUT DOWN ON YOUR DRINKING: NO
HOW MANY STANDARD DRINKS CONTAINING ALCOHOL DO YOU HAVE ON A TYPICAL DAY: PATIENT DOES NOT DRINK
TOTAL SCORE: 0
HOW OFTEN DO YOU HAVE 6 OR MORE DRINKS ON ONE OCCASION: NEVER
EVER FELT BAD OR GUILTY ABOUT YOUR DRINKING: NO
HAVE PEOPLE ANNOYED YOU BY CRITICIZING YOUR DRINKING: NO
AUDIT-C TOTAL SCORE: 0
HOW OFTEN DO YOU HAVE A DRINK CONTAINING ALCOHOL: NEVER
SKIP TO QUESTIONS 9-10: 1
EVER HAD A DRINK FIRST THING IN THE MORNING TO STEADY YOUR NERVES TO GET RID OF A HANGOVER: NO
AUDIT-C TOTAL SCORE: 0

## 2024-10-08 ASSESSMENT — PAIN - FUNCTIONAL ASSESSMENT
PAIN_FUNCTIONAL_ASSESSMENT: 0-10
PAIN_FUNCTIONAL_ASSESSMENT: 0-10

## 2024-10-08 ASSESSMENT — PAIN SCALES - GENERAL
PAINLEVEL_OUTOF10: 0 - NO PAIN
PAINLEVEL_OUTOF10: 0 - NO PAIN

## 2024-10-08 ASSESSMENT — PATIENT HEALTH QUESTIONNAIRE - PHQ9
2. FEELING DOWN, DEPRESSED OR HOPELESS: NOT AT ALL
SUM OF ALL RESPONSES TO PHQ9 QUESTIONS 1 & 2: 0
1. LITTLE INTEREST OR PLEASURE IN DOING THINGS: NOT AT ALL

## 2024-10-08 NOTE — TELEPHONE ENCOUNTER
Pt called upfront concerned with amount of tylenol she has taken since ED visit last night. I spoke to the patient when she advised at home has taken 6 doses of 1,000 mg. After discusses this concern with Dr Webber, I advised the patient to go back to ER to have proper labs done due the concern of liver damage.

## 2024-10-08 NOTE — ED TRIAGE NOTES
Pt presented to the ED with c/o accidental tylenol overdose. Pt was seen last night here and dx with pneumonia. Pt thought she was alternating motrin and tylenol for fever control but realized she was taking tylenol from 2 different brands with different bottles. Pt states she took about 9,000 mg within a 24 hr period. Pt denies any attempt to harm herself. VSS and placed on cardiac monitor.

## 2024-10-08 NOTE — ED NOTES
"Pt arrives with c/o accidental Tylenol overdose. PT reports taking approx 9,000mg of Tylenol in the last 24 hours. PT reports alternating between name brand tylenol and the generic brand from \"[employer]\" which pt thought was Ibuprofen. PT is not SI, stating \"I'm just an idiot and didn't read\". PT currently denying abdominal pain, n/v/d, bloody stools. Pt in no obvious distress at this time.     Ahsan Baron RN  10/08/24 1747       Ahsan Baron RN  10/08/24 1759    "

## 2024-10-08 NOTE — ED PROVIDER NOTES
[facility]  ED  Encounter Note  Admit Date/RoomTime: 10/8/2024  5:25 PM  ED Room: 219/219-B  NAME: Karen Thomson  : 1985  MRN: [id number]     Chief Complaint:  Tylenol Overdose    HISTORY OF PRESENT ILLNESS        Karen Thomson is a 39 y.o. female who presents to the ED for evaluation of accidental tylenol overdose. Patient was just here in the ED yesterday and diagnosed with pneumonia. She feels terrible and has had fever coughing chills and has been alternating tylenol ibuprofen but she actually was taking generic and brandname tylenol instead. She figures she has been taking 9000 mg tylenol over the last 24 hours with last dose of 500 ml at 430 pm or so. She feels terrible but denied abdominal pain nausea. Takes crestor antibiotic and zepbound for weight loss.     ROS   Pertinent positives and negatives are stated within HPI, all other systems reviewed and are negative.    Past Medical History:  has a past medical history of Acute pharyngitis, unspecified (2019), Acute upper respiratory infection, unspecified (10/23/2016), Adjustment disorder with depressed mood (2019), Anemia complicating [facility], [facility] (2016), Anxiety disorder, unspecified (2019), Body mass index (BMI) 39.0-39.9, adult (2021), Change in bowel habit (2015), Chronic sinusitis, unspecified (2017), Class 1 obesity with body mass index (BMI) of 33.0 to 33.9 in adult (2023), Diarrhea, unspecified (2021), Disease of upper respiratory tract, unspecified (2021), Dry eye syndrome of bilateral lacrimal glands (2017), Encounter for routine postpartum follow-up (2016), Encounter for screening for human immunodeficiency virus (HIV) (2015), Encounter for screening for other infectious and parasitic diseases (2015), Encounter for screening for other viral diseases (2015), Fever, unspecified  (2021), Headache, unspecified (2017), History of  section (2020), 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), Localized enlarged lymph nodes (10/20/2016), Low vitamin D level (2023), Metrorrhagia (2023), Obesity, unspecified (10/25/2021), Obesity, unspecified (2021), Ocular pain, left eye (2017), Ocular pain, left eye (2017), Other abnormal and inconclusive findings on diagnostic imaging of breast (2017), Other conditions influencing health status (2017), Other specified health status, Other specified noninflammatory disorders of vagina (08/15/2018), Other specified noninflammatory disorders of vagina, Palpitations (2015), Personal history of diseases of the skin and subcutaneous tissue (2017), Personal history of diseases of the skin and subcutaneous tissue (2018), Personal history of other complications of pregnancy, childbirth and the puerperium (2020), Personal history of other diseases of the female genital tract (2015), Personal history of other diseases of the female genital tract (2018), Personal history of other diseases of the nervous system and sense organs (2021), Personal history of other diseases of the respiratory system (2019), Personal history of other diseases of the respiratory system (2020), Personal history of other diseases of the respiratory system (2017), Personal history of other diseases of the respiratory system (2019), Personal history of other diseases of urinary system (2021), Personal history of other endocrine, nutritional and metabolic disease (2021), Personal history of other infectious and parasitic diseases (2019), Personal history of other specified conditions (2017), Personal history of other specified conditions (2021), Personal history of other specified conditions (2015), Personal history of other specified  conditions (01/13/2015), Personal history of other specified conditions (10/01/2015), Personal history of pneumonia (recurrent) (12/02/2021), Postpartum depression (06/28/2016), Postpartum depression (04/04/2016), Procreative counseling and advice using natural family planning (12/04/2014), Rash and other nonspecific skin eruption (11/24/2017), and Unspecified acute conjunctivitis, bilateral (03/02/2020).    Surgical History:  has a past surgical history that includes Other surgical history (04/05/2018); Other surgical history (01/20/2020); and Tonsillectomy (01/13/2015).    Social History:  reports that she has never smoked. She has never used smokeless tobacco. She reports current alcohol use. She reports that she does not use drugs.    Family History: family history includes Cancer in her maternal grandmother and paternal grandmother; Diabetes in her maternal grandmother and paternal grandmother; cardiac disorder in her father, maternal grandmother, mother, and paternal grandmother.     Allergies: Acetylcysteine and Progesterone    PHYSICAL EXAM   Oxygen Saturation Interpretation: Normal.          Physical Exam  Constitutional/General: Alert and oriented x3, well appearing, non toxic  HEENT:  NC/NT. PERRLA.  Airway patent.  Neck: Supple, full ROM. No midline vertebral tenderness or crepitus.   Respiratory: Lung sounds clear to auscultation bilaterally. No wheezes, rhonchi or stridor. Not in respiratory distress.  CV:  Regular rate. Regular rhythm. No murmurs or rubs. 2+ distal pulses.  GI:  Abdomen soft, non-tender, non-distended. +BS. No rebound, guarding, or rigidity. No pulsatile masses.  Musculoskeletal: Moves all extremities x 4. Warm and well perfused. Capillary refill <3 seconds  Integument: Skin warm and dry. No rashes.   Neurologic: Alert and oriented with no focal deficits, symmetric strength 5/5 in the upper and lower extremities bilaterally.  Psychiatric: Normal affect.    Lab / Imaging Results   (All  laboratory and radiology results have been personally reviewed by myself)  Labs:  Results for orders placed or performed during the hospital encounter of 10/08/24   Legionella Antigen, Urine    Specimen: Clean Catch/Voided; Urine   Result Value Ref Range    L. pneumophila Urine Ag Negative Negative   Streptococcus pneumoniae Antigen, Urine    Specimen: Clean Catch/Voided; Urine   Result Value Ref Range    Streptococcus pneumoniae Ag, Urine Negative Negative   Blood Culture    Specimen: Peripheral Venipuncture; Blood culture   Result Value Ref Range    Blood Culture No growth at 2 days    Blood Culture    Specimen: Peripheral Venipuncture; Blood culture   Result Value Ref Range    Blood Culture No growth at 2 days    Staphylococcus aureus/MRSA colonization, Culture    Specimen: Anterior Nares; Swab   Result Value Ref Range    Staph/MRSA Screen Culture No Staphylococcus aureus isolated    CBC and Auto Differential   Result Value Ref Range    WBC 4.1 (L) 4.4 - 11.3 x10*3/uL    nRBC 0.0 0.0 - 0.0 /100 WBCs    RBC 4.50 4.00 - 5.20 x10*6/uL    Hemoglobin 13.0 12.0 - 16.0 g/dL    Hematocrit 38.8 36.0 - 46.0 %    MCV 86 80 - 100 fL    MCH 28.9 26.0 - 34.0 pg    MCHC 33.5 32.0 - 36.0 g/dL    RDW 12.4 11.5 - 14.5 %    Platelets 138 (L) 150 - 450 x10*3/uL    Neutrophils % 52.4 40.0 - 80.0 %    Immature Granulocytes %, Automated 0.2 0.0 - 0.9 %    Lymphocytes % 32.4 13.0 - 44.0 %    Monocytes % 11.3 2.0 - 10.0 %    Eosinophils % 3.2 0.0 - 6.0 %    Basophils % 0.5 0.0 - 2.0 %    Neutrophils Absolute 2.14 1.20 - 7.70 x10*3/uL    Immature Granulocytes Absolute, Automated 0.01 0.00 - 0.70 x10*3/uL    Lymphocytes Absolute 1.32 1.20 - 4.80 x10*3/uL    Monocytes Absolute 0.46 0.10 - 1.00 x10*3/uL    Eosinophils Absolute 0.13 0.00 - 0.70 x10*3/uL    Basophils Absolute 0.02 0.00 - 0.10 x10*3/uL   Magnesium   Result Value Ref Range    Magnesium 2.07 1.60 - 2.40 mg/dL   Comprehensive metabolic panel   Result Value Ref Range    Glucose 90  74 - 99 mg/dL    Sodium 139 136 - 145 mmol/L    Potassium 3.5 3.5 - 5.3 mmol/L    Chloride 103 98 - 107 mmol/L    Bicarbonate 24 21 - 32 mmol/L    Anion Gap 16 10 - 20 mmol/L    Urea Nitrogen 7 6 - 23 mg/dL    Creatinine 0.60 0.50 - 1.05 mg/dL    eGFR >90 >60 mL/min/1.73m*2    Calcium 9.1 8.6 - 10.3 mg/dL    Albumin 4.0 3.4 - 5.0 g/dL    Alkaline Phosphatase 62 33 - 110 U/L    Total Protein 7.0 6.4 - 8.2 g/dL    AST 27 9 - 39 U/L    Bilirubin, Total 0.5 0.0 - 1.2 mg/dL    ALT 19 7 - 45 U/L   Lactate   Result Value Ref Range    Lactate 0.7 0.4 - 2.0 mmol/L   Acute Toxicology Panel, Blood   Result Value Ref Range    Acetaminophen 23.0 10.0 - 30.0 ug/mL    Salicylate  <3 4 - 20 mg/dL    Alcohol <10 <=10 mg/dL   Drug Screen, Urine   Result Value Ref Range    Amphetamine Screen, Urine Presumptive Negative Presumptive Negative    Barbiturate Screen, Urine Presumptive Negative Presumptive Negative    Benzodiazepines Screen, Urine Presumptive Negative Presumptive Negative    Cannabinoid Screen, Urine Presumptive Negative Presumptive Negative    Cocaine Metabolite Screen, Urine Presumptive Negative Presumptive Negative    Fentanyl Screen, Urine Presumptive Negative Presumptive Negative    Opiate Screen, Urine Presumptive Negative Presumptive Negative    Oxycodone Screen, Urine Presumptive Negative Presumptive Negative    PCP Screen, Urine Presumptive Negative Presumptive Negative    Methadone Screen, Urine Presumptive Negative Presumptive Negative   Human Chorionic Gonadotropin, Serum Quantitative   Result Value Ref Range    HCG, Beta-Quantitative <2 <5 mIU/mL   Protime-INR   Result Value Ref Range    Protime 11.8 9.8 - 12.8 seconds    INR 1.0 0.9 - 1.1   aPTT   Result Value Ref Range    aPTT 30 27 - 38 seconds   RSV PCR   Result Value Ref Range    RSV PCR Not Detected Not Detected   Tryptase   Result Value Ref Range    Tryptase 5.3 <=10.9 ug/L   CBC and Auto Differential   Result Value Ref Range    WBC 2.2 (L) 4.4 - 11.3  x10*3/uL    nRBC 0.0 0.0 - 0.0 /100 WBCs    RBC 4.59 4.00 - 5.20 x10*6/uL    Hemoglobin 13.4 12.0 - 16.0 g/dL    Hematocrit 39.7 36.0 - 46.0 %    MCV 87 80 - 100 fL    MCH 29.2 26.0 - 34.0 pg    MCHC 33.8 32.0 - 36.0 g/dL    RDW 12.4 11.5 - 14.5 %    Platelets 153 150 - 450 x10*3/uL    Neutrophils % 77.2 40.0 - 80.0 %    Immature Granulocytes %, Automated 0.0 0.0 - 0.9 %    Lymphocytes % 20.5 13.0 - 44.0 %    Monocytes % 2.3 2.0 - 10.0 %    Eosinophils % 0.0 0.0 - 6.0 %    Basophils % 0.0 0.0 - 2.0 %    Neutrophils Absolute 1.69 1.20 - 7.70 x10*3/uL    Immature Granulocytes Absolute, Automated 0.00 0.00 - 0.70 x10*3/uL    Lymphocytes Absolute 0.45 (L) 1.20 - 4.80 x10*3/uL    Monocytes Absolute 0.05 (L) 0.10 - 1.00 x10*3/uL    Eosinophils Absolute 0.00 0.00 - 0.70 x10*3/uL    Basophils Absolute 0.00 0.00 - 0.10 x10*3/uL   Comprehensive Metabolic Panel   Result Value Ref Range    Glucose 124 (H) 74 - 99 mg/dL    Sodium 139 136 - 145 mmol/L    Potassium 3.9 3.5 - 5.3 mmol/L    Chloride 111 (H) 98 - 107 mmol/L    Bicarbonate 20 (L) 21 - 32 mmol/L    Anion Gap 12 10 - 20 mmol/L    Urea Nitrogen 6 6 - 23 mg/dL    Creatinine 0.49 (L) 0.50 - 1.05 mg/dL    eGFR >90 >60 mL/min/1.73m*2    Calcium 8.5 (L) 8.6 - 10.3 mg/dL    Albumin 3.6 3.4 - 5.0 g/dL    Alkaline Phosphatase 55 33 - 110 U/L    Total Protein 6.2 (L) 6.4 - 8.2 g/dL    AST 20 9 - 39 U/L    Bilirubin, Total 0.4 0.0 - 1.2 mg/dL    ALT 17 7 - 45 U/L   Acetaminophen   Result Value Ref Range    Acetaminophen <10.0 10.0 - 30.0 ug/mL   Magnesium   Result Value Ref Range    Magnesium 1.77 1.60 - 2.40 mg/dL   Phosphorus   Result Value Ref Range    Phosphorus 2.4 (L) 2.5 - 4.9 mg/dL   Protime-INR   Result Value Ref Range    Protime 12.0 9.8 - 12.8 seconds    INR 1.1 0.9 - 1.1   Procalcitonin   Result Value Ref Range    Procalcitonin 0.12 (H) <=0.07 ng/mL   POCT GLUCOSE   Result Value Ref Range    POCT Glucose 114 (H) 74 - 99 mg/dL   ECG 12 lead   Result Value Ref Range     Ventricular Rate 89 BPM    Atrial Rate 89 BPM    WV Interval 146 ms    QRS Duration 78 ms    QT Interval 348 ms    QTC Calculation(Bazett) 423 ms    P Axis 46 degrees    R Axis 1 degrees    T Axis 32 degrees    QRS Count 15 beats    Q Onset 221 ms    P Onset 148 ms    P Offset 199 ms    T Offset 395 ms    QTC Fredericia 396 ms   Electrocardiogram, 12-lead PRN ACS symptoms   Result Value Ref Range    Ventricular Rate 110 BPM    Atrial Rate 110 BPM    WV Interval 138 ms    QRS Duration 78 ms    QT Interval 338 ms    QTC Calculation(Bazett) 457 ms    P Axis 55 degrees    R Axis 0 degrees    T Axis 53 degrees    QRS Count 18 beats    Q Onset 221 ms    P Onset 152 ms    P Offset 206 ms    T Offset 390 ms    QTC Fredericia 414 ms     Imaging:  All Radiology results interpreted by Radiologist unless otherwise noted.  No orders to display       ED Course / Medical Decision Making     Medications   sodium chloride 0.9 % bolus 1,000 mL (0 mL intravenous Stopped 10/8/24 1835)   ketorolac (Toradol) injection 30 mg (30 mg intravenous Given 10/8/24 1858)   acetylcysteine (Acetadote) 10.4 g in dextrose 5% 200 mL infusion (0 g intravenous Stopped 10/8/24 2029)   diphenhydrAMINE (BENADryl) injection 50 mg (50 mg intravenous Given 10/8/24 2037)   famotidine PF (Pepcid) injection 20 mg (20 mg intravenous Given 10/8/24 2045)   methylPREDNISolone sod succinate (SOLU-Medrol) injection 60 mg (60 mg intravenous Given 10/8/24 2045)   hydrOXYzine HCL (Atarax) tablet 50 mg (50 mg oral Given 10/8/24 2242)   lactated Ringer's bolus 1,000 mL (0 mL intravenous Stopped 10/9/24 1542)     ED Course as of 10/11/24 2134   Tue Oct 08, 2024   18[facility] Poison Control [phone] called, recommending pending following labs. Likely start Tylenol antidote [PK]   2025 Having allergic reaction, will give benadryl steroids pepcid. Holding Mucomyst for now. [PK]      ED Course User Index  [PK] Shelbie Hightower, APRN-CNP         Diagnoses as of  10/11/24 2134   Accidental acetaminophen overdose, initial encounter     Re-examination:    Patient’s condition stable    Consult(s):   Poison Control, Pharmacy      MDM:       Karen Thomson is a 39 y.o. female who presents to the ED for evaluation of accidental tylenol overdose. Patient was just here in the ED yesterday and diagnosed with pneumonia. She feels terrible and has had fever coughing chills and has been alternating tylenol ibuprofen but she actually was taking generic and brandname tylenol instead. She figures she has been taking 9000 mg tylenol over the last 24 hours with last dose of 500 ml at 430 pm or so. She feels terrible but denied abdominal pain nausea. Takes crestor antibiotic and zepbound for weight loss.     ED course stable    Patient labs stable at time of ED presentation tylenol level 23. Pharmacy did recommend starting the mucomyst despite normal ast alt and no nausea vomiting abd pain or other systemic changes. Approximately 1 hour into the infusion, pt did develop rash. However, pt did take her own oral antibiotics Doxy and Augmentin for her pna diagnosed yesterday. Felt her tongue was different but denied sob or tongue welling trouble breathing. She is slightly itchy. Stopped the infusion, received steroids, benadryl and pepcid.  Rest of labs, vss wnl.   Admitted to hospitalist for observation of accidental tylenol overdose.    Plan of Care/Counseling:  I reviewed today's visit with the patient  in addition to providing specific details for the plan of care and counseling regarding the diagnosis and prognosis.  Questions are answered at this time and are agreeable with the plan.    ASSESSMENT     1. Accidental acetaminophen overdose, initial encounter    2. Seasonal allergies    3. Pott disease    4. Anxiety      PLAN   Admit to hospitalist     New Medications     New Medications Ordered This Visit   Medications    sodium chloride 0.9 % bolus 1,000 mL    ketorolac (Toradol)  injection 30 mg    acetylcysteine (Acetadote) 10.4 g in dextrose 5% 200 mL infusion    diphenhydrAMINE (BENADryl) injection 50 mg    diphenhydrAMINE (BENADryl) injection  - Omnicell Override Pull     Created by cabinet override    famotidine PF (Pepcid) injection 20 mg    methylPREDNISolone sod succinate (SOLU-Medrol) injection 60 mg    methylPREDNISolone sod succinate (SOLU-Medrol) injection  - Omnicell Override Pull     Created by cabinet override    famotidine PF (Pepcid) injection  - Omnicell Override Pull     Created by cabinet override    hydrOXYzine HCL (Atarax) tablet 50 mg    lactated Ringer's bolus 1,000 mL    dextromethorphan-guaifenesin (Mucinex DM)  mg 12 hr tablet     Sig: Take 1 tablet by mouth 2 times a day as needed for cough. Do not crush, chew, or split.     Dispense:  30 tablet     Refill:  0     Electronically signed by LAUREN Miles    **This report was transcribed using voice recognition software. Every effort was made to ensure accuracy; however, inadvertent computerized transcription errors may be present.  END OF ED PROVIDER NOTE     LAUREN Miles  10/11/24 2056

## 2024-10-09 ENCOUNTER — APPOINTMENT (OUTPATIENT)
Dept: CARDIOLOGY | Facility: HOSPITAL | Age: 39
End: 2024-10-09
Payer: COMMERCIAL

## 2024-10-09 VITALS
HEART RATE: 138 BPM | WEIGHT: 160 LBS | OXYGEN SATURATION: 99 % | RESPIRATION RATE: 18 BRPM | HEIGHT: 63 IN | BODY MASS INDEX: 28.35 KG/M2 | SYSTOLIC BLOOD PRESSURE: 133 MMHG | DIASTOLIC BLOOD PRESSURE: 91 MMHG | TEMPERATURE: 98.4 F

## 2024-10-09 PROBLEM — T39.1X1A ACETAMINOPHEN OVERDOSE, ACCIDENTAL OR UNINTENTIONAL, INITIAL ENCOUNTER: Status: RESOLVED | Noted: 2024-10-08 | Resolved: 2024-10-09

## 2024-10-09 LAB
ALBUMIN SERPL BCP-MCNC: 3.6 G/DL (ref 3.4–5)
ALP SERPL-CCNC: 55 U/L (ref 33–110)
ALT SERPL W P-5'-P-CCNC: 17 U/L (ref 7–45)
ANION GAP SERPL CALC-SCNC: 12 MMOL/L (ref 10–20)
APAP SERPL-MCNC: <10 UG/ML
AST SERPL W P-5'-P-CCNC: 20 U/L (ref 9–39)
ATRIAL RATE: 110 BPM
ATRIAL RATE: 89 BPM
BASOPHILS # BLD AUTO: 0 X10*3/UL (ref 0–0.1)
BASOPHILS NFR BLD AUTO: 0 %
BILIRUB SERPL-MCNC: 0.4 MG/DL (ref 0–1.2)
BUN SERPL-MCNC: 6 MG/DL (ref 6–23)
CALCIUM SERPL-MCNC: 8.5 MG/DL (ref 8.6–10.3)
CHLORIDE SERPL-SCNC: 111 MMOL/L (ref 98–107)
CO2 SERPL-SCNC: 20 MMOL/L (ref 21–32)
CREAT SERPL-MCNC: 0.49 MG/DL (ref 0.5–1.05)
EGFRCR SERPLBLD CKD-EPI 2021: >90 ML/MIN/1.73M*2
EOSINOPHIL # BLD AUTO: 0 X10*3/UL (ref 0–0.7)
EOSINOPHIL NFR BLD AUTO: 0 %
ERYTHROCYTE [DISTWIDTH] IN BLOOD BY AUTOMATED COUNT: 12.4 % (ref 11.5–14.5)
GLUCOSE BLD MANUAL STRIP-MCNC: 114 MG/DL (ref 74–99)
GLUCOSE SERPL-MCNC: 124 MG/DL (ref 74–99)
HCT VFR BLD AUTO: 39.7 % (ref 36–46)
HGB BLD-MCNC: 13.4 G/DL (ref 12–16)
IMM GRANULOCYTES # BLD AUTO: 0 X10*3/UL (ref 0–0.7)
IMM GRANULOCYTES NFR BLD AUTO: 0 % (ref 0–0.9)
INR PPP: 1.1 (ref 0.9–1.1)
LEGIONELLA AG UR QL: NEGATIVE
LYMPHOCYTES # BLD AUTO: 0.45 X10*3/UL (ref 1.2–4.8)
LYMPHOCYTES NFR BLD AUTO: 20.5 %
MAGNESIUM SERPL-MCNC: 1.77 MG/DL (ref 1.6–2.4)
MCH RBC QN AUTO: 29.2 PG (ref 26–34)
MCHC RBC AUTO-ENTMCNC: 33.8 G/DL (ref 32–36)
MCV RBC AUTO: 87 FL (ref 80–100)
MONOCYTES # BLD AUTO: 0.05 X10*3/UL (ref 0.1–1)
MONOCYTES NFR BLD AUTO: 2.3 %
NEUTROPHILS # BLD AUTO: 1.69 X10*3/UL (ref 1.2–7.7)
NEUTROPHILS NFR BLD AUTO: 77.2 %
NRBC BLD-RTO: 0 /100 WBCS (ref 0–0)
P AXIS: 46 DEGREES
P AXIS: 63 DEGREES
P OFFSET: 199 MS
P OFFSET: 200 MS
P ONSET: 148 MS
P ONSET: 149 MS
PHOSPHATE SERPL-MCNC: 2.4 MG/DL (ref 2.5–4.9)
PLATELET # BLD AUTO: 153 X10*3/UL (ref 150–450)
POTASSIUM SERPL-SCNC: 3.9 MMOL/L (ref 3.5–5.3)
PR INTERVAL: 146 MS
PR INTERVAL: 146 MS
PROCALCITONIN SERPL-MCNC: 0.12 NG/ML
PROT SERPL-MCNC: 6.2 G/DL (ref 6.4–8.2)
PROTHROMBIN TIME: 12 SECONDS (ref 9.8–12.8)
Q ONSET: 221 MS
Q ONSET: 222 MS
QRS COUNT: 15 BEATS
QRS COUNT: 19 BEATS
QRS DURATION: 78 MS
QRS DURATION: 78 MS
QT INTERVAL: 340 MS
QT INTERVAL: 348 MS
QTC CALCULATION(BAZETT): 423 MS
QTC CALCULATION(BAZETT): 460 MS
QTC FREDERICIA: 396 MS
QTC FREDERICIA: 416 MS
R AXIS: 1 DEGREES
R AXIS: 8 DEGREES
RBC # BLD AUTO: 4.59 X10*6/UL (ref 4–5.2)
S PNEUM AG UR QL: NEGATIVE
SODIUM SERPL-SCNC: 139 MMOL/L (ref 136–145)
T AXIS: 32 DEGREES
T AXIS: 46 DEGREES
T OFFSET: 392 MS
T OFFSET: 395 MS
VENTRICULAR RATE: 110 BPM
VENTRICULAR RATE: 89 BPM
WBC # BLD AUTO: 2.2 X10*3/UL (ref 4.4–11.3)

## 2024-10-09 PROCEDURE — 2500000004 HC RX 250 GENERAL PHARMACY W/ HCPCS (ALT 636 FOR OP/ED)

## 2024-10-09 PROCEDURE — 83735 ASSAY OF MAGNESIUM: CPT

## 2024-10-09 PROCEDURE — 80143 DRUG ASSAY ACETAMINOPHEN: CPT

## 2024-10-09 PROCEDURE — 80053 COMPREHEN METABOLIC PANEL: CPT

## 2024-10-09 PROCEDURE — 99239 HOSP IP/OBS DSCHRG MGMT >30: CPT | Performed by: INTERNAL MEDICINE

## 2024-10-09 PROCEDURE — 96361 HYDRATE IV INFUSION ADD-ON: CPT

## 2024-10-09 PROCEDURE — 84100 ASSAY OF PHOSPHORUS: CPT

## 2024-10-09 PROCEDURE — 2500000001 HC RX 250 WO HCPCS SELF ADMINISTERED DRUGS (ALT 637 FOR MEDICARE OP)

## 2024-10-09 PROCEDURE — 85610 PROTHROMBIN TIME: CPT

## 2024-10-09 PROCEDURE — 36415 COLL VENOUS BLD VENIPUNCTURE: CPT

## 2024-10-09 PROCEDURE — G0378 HOSPITAL OBSERVATION PER HR: HCPCS

## 2024-10-09 PROCEDURE — 82947 ASSAY GLUCOSE BLOOD QUANT: CPT | Mod: 59

## 2024-10-09 PROCEDURE — 93010 ELECTROCARDIOGRAM REPORT: CPT | Performed by: INTERNAL MEDICINE

## 2024-10-09 PROCEDURE — 93005 ELECTROCARDIOGRAM TRACING: CPT

## 2024-10-09 PROCEDURE — 84145 PROCALCITONIN (PCT): CPT | Mod: GEALAB

## 2024-10-09 PROCEDURE — 85025 COMPLETE CBC W/AUTO DIFF WBC: CPT

## 2024-10-09 RX ORDER — HYDROXYZINE HYDROCHLORIDE 25 MG/1
25 TABLET, FILM COATED ORAL EVERY 6 HOURS PRN
Qty: 30 TABLET | Refills: 0 | Status: SHIPPED | OUTPATIENT
Start: 2024-10-09 | End: 2024-10-10

## 2024-10-09 RX ORDER — AZITHROMYCIN 500 MG/1
500 TABLET, FILM COATED ORAL
Status: DISCONTINUED | OUTPATIENT
Start: 2024-10-09 | End: 2024-10-09

## 2024-10-09 RX ORDER — HYDROXYZINE HYDROCHLORIDE 25 MG/1
25 TABLET, FILM COATED ORAL EVERY 6 HOURS PRN
Qty: 15 TABLET | Refills: 0 | Status: SHIPPED | OUTPATIENT
Start: 2024-10-09 | End: 2024-10-09 | Stop reason: WASHOUT

## 2024-10-09 RX ORDER — HYDROXYZINE HYDROCHLORIDE 25 MG/1
25 TABLET, FILM COATED ORAL EVERY 6 HOURS PRN
Status: DISCONTINUED | OUTPATIENT
Start: 2024-10-09 | End: 2024-10-09 | Stop reason: HOSPADM

## 2024-10-09 RX ORDER — AZITHROMYCIN 500 MG/1
500 TABLET, FILM COATED ORAL
Status: DISCONTINUED | OUTPATIENT
Start: 2024-10-09 | End: 2024-10-09 | Stop reason: HOSPADM

## 2024-10-09 ASSESSMENT — PAIN - FUNCTIONAL ASSESSMENT: PAIN_FUNCTIONAL_ASSESSMENT: 0-10

## 2024-10-09 ASSESSMENT — COGNITIVE AND FUNCTIONAL STATUS - GENERAL
DAILY ACTIVITIY SCORE: 24
MOBILITY SCORE: 24

## 2024-10-09 ASSESSMENT — ACTIVITIES OF DAILY LIVING (ADL): LACK_OF_TRANSPORTATION: NO

## 2024-10-09 ASSESSMENT — PAIN SCALES - GENERAL: PAINLEVEL_OUTOF10: 0 - NO PAIN

## 2024-10-09 NOTE — ED NOTES
Pt experienced an allergic reaction to Acetylcysteine medication. PT with rash and throat swelling sensation. This writer stopped infusion and informed JESICA Montoya. Lindsay ordered IV Benadryl, Solumedrol and Pepcid. Pt currently reporting improvement to symptoms. This writer will hold pt in ED for observation until pt's HR improves.     Ahsan Baron RN  10/08/24 4151

## 2024-10-09 NOTE — PROGRESS NOTES
10/09/24 0846   Discharge Planning   Living Arrangements Spouse/significant other   Support Systems Spouse/significant other   Assistance Needed A&OX4; independent with ADLs with no DME; drives; room air baseline and currently room air   Type of Residence Private residence   Number of Stairs to Enter Residence 4   Number of Stairs Within Residence 28  (14 up & 14 down)   Do you have animals or pets at home? Yes   Type of Animals or Pets 1 dog   Who is requesting discharge planning? Provider   Home or Post Acute Services None   Expected Discharge Disposition Home  (Patient denies any home going needs at this time)   Does the patient need discharge transport arranged? No   Financial Resource Strain   How hard is it for you to pay for the very basics like food, housing, medical care, and heating? Not hard   Housing Stability   In the last 12 months, was there a time when you were not able to pay the mortgage or rent on time? N   In the past 12 months, how many times have you moved where you were living? 1   At any time in the past 12 months, were you homeless or living in a shelter (including now)? N   Transportation Needs   In the past 12 months, has lack of transportation kept you from medical appointments or from getting medications? no   In the past 12 months, has lack of transportation kept you from meetings, work, or from getting things needed for daily living? No        10/09/24 1100   Discharge Planning   Expected Discharge Disposition Home  (Patient aware of dc today and is fine with dc. Pt spouse bedside to transport home when dc complete. Patient denies home going needs)

## 2024-10-09 NOTE — DISCHARGE SUMMARY
Discharge Diagnosis  Acetaminophen overdose, accidental or unintentional, initial encounter    Issues Requiring Follow-Up  Will follow up with pcp post hospital stay.   Will follow up with Cardiology regarding family hx of Jaeger Disease    Discharge Meds     Medication List      START taking these medications     dextromethorphan-guaifenesin  mg 12 hr tablet; Commonly known as:   Mucinex DM; Take 1 tablet by mouth 2 times a day as needed for cough. Do   not crush, chew, or split.   hydrOXYzine HCL 25 mg tablet; Commonly known as: Atarax; Take 1 tablet   (25 mg) by mouth every 6 hours if needed for anxiety.     CONTINUE taking these medications     amoxicillin-pot clavulanate 875-125 mg tablet; Commonly known as:   Augmentin; Take 1 tablet by mouth every 12 hours for 7 days.   cyanocobalamin 1,000 mcg tablet; Commonly known as: Vitamin B-12   multivitamin tablet   rosuvastatin 5 mg tablet; Commonly known as: Crestor; Take 1 tablet (5   mg) by mouth once daily.   tirzepatide (weight loss) 15 mg/0.5 mL injection; Commonly known as:   Zepbound; Inject 15 mg under the skin every 7 days.     STOP taking these medications     cholecalciferol 50,000 unit capsule; Commonly known as: Vitamin D-3   doxycycline 100 mg capsule; Commonly known as: Vibramycin       Test Results Pending At Discharge  Pending Labs       Order Current Status    Procalcitonin In process    Staphylococcus aureus/MRSA colonization, Culture In process    Tryptase In process    Blood Culture Preliminary result    Blood Culture Preliminary result            Hospital Course  Karen Thomson is a 39 y.o. female presenting for unintentional acetaminophen overdose (9 grams over 24 hours). History of anorexia nervosa, hyperlipidemia, stable pulmonary nodule, and recently diagnosed bacterial CAP. She was started on acetylcysteine given her acetaminophen level of 23, however her treatment course was complicated by a presumed allergic reaction which  began 1 hour after initiation of acetylcysteine. Her symptoms resolved with administration of diphenhydramine, methylprednisolone, and famotidine.      Tryptase at 2230 on 10/8 and another ordered for 2230 on 10/9 (can be done outpatient).     For her pneumonia doxycycline was discontinued but amox/clav was continued. Azithromycin 500 was added for 3 days.    AST, ALT, and INR were all normal.     On the morning of 10/9, Karen reports no shortness of breath or abdominal pain. She does endorse a nonproductive cough. She denies any lingering throat swelling. She was tachycardic on day of discharge. She also mentioned being anxious about her hospital stay. Orthostatic blood pressure was positive for heart rate. Pt was given a bolus of fluid. After fluid administration pt was agreeable to dc.    Poison control were contacted and her case is closed.    Patient is stable for discharge. Will continue course of augmentin. Will follow up with pcp post hospital stay. Referral to cardiology placed for family hx of Jaeger disease.    Pertinent Physical Exam At Time of Discharge  Physical Exam  Constitutional:       General: She is in acute distress.      Appearance: Normal appearance. She is normal weight.   HENT:      Head: Normocephalic.      Nose: Nose normal.      Mouth/Throat:      Mouth: Mucous membranes are moist.   Eyes:      Extraocular Movements: Extraocular movements intact.      Conjunctiva/sclera: Conjunctivae normal.      Pupils: Pupils are equal, round, and reactive to light.   Cardiovascular:      Rate and Rhythm: Regular rhythm. Tachycardia present.      Pulses: Normal pulses.      Heart sounds: Normal heart sounds.   Pulmonary:      Effort: Pulmonary effort is normal.      Breath sounds: Normal breath sounds.   Abdominal:      General: Abdomen is flat. Bowel sounds are normal.      Palpations: Abdomen is soft.   Musculoskeletal:         General: Normal range of motion.   Skin:     General: Skin is warm.    Neurological:      General: No focal deficit present.      Mental Status: She is alert and oriented to person, place, and time. Mental status is at baseline.   Psychiatric:         Mood and Affect: Mood normal.         Behavior: Behavior normal.         Thought Content: Thought content normal.         Judgment: Judgment normal.         Outpatient Follow-Up  Future Appointments   Date Time Provider Department Center   1/2/2025  9:00 AM LAUREN Mcdermott [id number] [facility]

## 2024-10-09 NOTE — DISCHARGE SUMMARY
Discharge Diagnosis  Acetaminophen overdose, accidental or unintentional, initial encounter    Issues Requiring Follow-Up  Anaphylaxis--follow with PCP for baseline tryptase in the next 1-2 weeks  Pneumonia--follow with PCP in the next 1-2 weeks as above, or sooner if symptoms worsen    Discharge Meds     Medication List      START taking these medications     dextromethorphan-guaifenesin  mg 12 hr tablet; Commonly known as:   Mucinex DM; Take 1 tablet by mouth 2 times a day as needed for cough. Do   not crush, chew, or split.     CONTINUE taking these medications     amoxicillin-pot clavulanate 875-125 mg tablet; Commonly known as:   Augmentin; Take 1 tablet by mouth every 12 hours for 7 days.   cyanocobalamin 1,000 mcg tablet; Commonly known as: Vitamin B-12   multivitamin tablet   rosuvastatin 5 mg tablet; Commonly known as: Crestor; Take 1 tablet (5   mg) by mouth once daily.   tirzepatide (weight loss) 15 mg/0.5 mL injection; Commonly known as:   Zepbound; Inject 15 mg under the skin every 7 days.     STOP taking these medications     cholecalciferol 50,000 unit capsule; Commonly known as: Vitamin D-3   doxycycline 100 mg capsule; Commonly known as: Vibramycin       Test Results Pending At Discharge  Pending Labs       Order Current Status    Procalcitonin In process    Staphylococcus aureus/MRSA colonization, Culture In process    Tryptase In process    Blood Culture Preliminary result    Blood Culture Preliminary result            Hospital Course  Karen Thomson is a 39 y.o. female presenting on the evening of 10/8 for unintentional acetaminophen overdose (9 grams over 24 hours). History of anorexia nervosa, hyperlipidemia, stable pulmonary nodule, and recently diagnosed bacterial CAP. Poison control were contacted. She was started on acetylcysteine given her acetaminophen level of 23, however her treatment course was complicated by a presumed allergic reaction which began 1 hour after initiation  of acetylcysteine. Her symptoms resolved with administration of diphenhydramine, methylprednisolone, and famotidine.      Tryptase level ordered and pending additional tryptase for baseline level can be done outpatient with PCP    For her pneumonia, doxycycline was discontinued but amox/clav was continued. Azithromycin 500 was added for 3 days.    AST, ALT, and INR were all normal. RSV was negative and legionella & strep urine test were negative. Previous testing on 10/7 for flu and COVID were negative. Additional testing pending as above.    She had anxiety with mild tachycardia and received hydroxyzine at 500 hrs on the morning of 10/9.    On the morning of 10/9, Karen reports no shortness of breath and feels like her breathing has improved. She denies any abdominal pain. She does endorse a nonproductive cough. She denies any lingering throat swelling.   In the afternoon, she continued to have intermittent tachycardia; 1 LR bolus given. She has no new complaints.    Poison control were contacted and her case is closed.    Plan to discharge home on Augmentin with close follow-up with PCP    Pertinent Physical Exam At Time of Discharge  Physical Exam  Constitutional:       Appearance: Normal appearance.   Cardiovascular:      Rate and Rhythm: Normal rate and regular rhythm.      Heart sounds: Normal heart sounds.   Pulmonary:      Effort: No respiratory distress.      Breath sounds: Normal breath sounds. No wheezing or rales.   Abdominal:      General: Abdomen is flat. There is no distension.      Tenderness: There is no abdominal tenderness.   Neurological:      Mental Status: She is alert.         Outpatient Follow-Up  Future Appointments   Date Time Provider Department Center   1/2/2025  9:00 AM LAUREN Mcdermott [id number]         YAO KATI

## 2024-10-09 NOTE — H&P
"Subjective   HPI  Karen Thomson is a 39 y.o. female who presented to the hospital for unintentional acetaminophen overdose. She has a history of anorexia nervosa, hyperlipidemia, stable pulmonary nodule, and recently diagnosed bacterial CAP. She presented to the [facility] ED on 10/7 on the advice of urgent care for fevers, chills, shortness of breath, and cough. She was diagnosed with bacterial CAP and discharged on augmentin and doxycyline. Overnight, she continued to have episodes of diaphoresis and subjective fevers/chills. For this reason she took antipyretics, and thought she was alternating ibuprofen and acetaminophen. On further inspection, she found that she was taking only acetaminophen. Per the patient, she had taken 9 doses of acetaminophen over 24 hours, each dose was 1g.  Recent travel to [place], flew back to [place] on 10/3, after which time she began noticing feeling \"off.\" Her fevers/chills/diaphoresis began the night of 10/5, and she states that she woke with her clothes and sheets soaked in sweat. The patient endorsed an intentional 35kg weight loss since 2020 and has been on medication and diet to achieve this. She has no smoking history, history of COPD, cancer, hot flashes, or tuberculosis. She has dogs, no other pets. Does not endorse any \"odd\" hobbies. She works as an .  Of note, she has a history of lobar consolidations as well as bacterial pneumonias and a stable pulmonary nodule. She does not follow with pulmonology.    Review of Systems   Constitutional:  Positive for chills, diaphoresis, fatigue and fever. Negative for activity change, appetite change and unexpected weight change.   Respiratory:  Positive for choking, chest tightness, shortness of breath and stridor.    Cardiovascular:  Positive for palpitations. Negative for chest pain and leg swelling.   Gastrointestinal: Negative.    Genitourinary: Negative.    Musculoskeletal: Negative.    Skin:  Positive for " rash.   Neurological:  Positive for dizziness. Negative for light-headedness, numbness and headaches.   Psychiatric/Behavioral:  Negative for agitation, dysphoric mood and sleep disturbance. The patient is nervous/anxious.      ED Course  Vitals - /85 (BP Location: Right arm, Patient Position: Sitting)   Pulse 108   Temp 36.8 °C (98.2 °F) (Temporal)   Resp 18   Wt 72.6 kg (160 lb)   SpO2 97%     One hour after initiation of acetylcysteine infusion (10.4g in 200mL d5 at 200mL/hr), the patient experienced facial itching/flushing, tachycardia, hypertension, and the sensation of her throat closing. Acetylcysteine was stopped and the patient was given methylprednisolone 60mg, diphenhydramine 50mg, and famotidine 20mg for a presumed anaphylactic/allergic reaction. Poison control was contacted and recommended either cessation of acetylcysteine treatment with confirmatory testing of acetaminophen and liver function in the morning resumption of acetylcysteine with diphenhydramine pre-treatment and a slower (halved) infusion rate. The choice was discussed with the patient who preferred to stop acetylcysteine treatment and check labs in the morning.    Labs -   Lab Results   Component Value Date    WBC 4.1 (L) 10/08/2024    HGB 13.0 10/08/2024    HCT 38.8 10/08/2024    MCV 86 10/08/2024     (L) 10/08/2024     Lab Results   Component Value Date    GLUCOSE 90 10/08/2024    CALCIUM 9.1 10/08/2024     10/08/2024    K 3.5 10/08/2024    CO2 24 10/08/2024     10/08/2024    BUN 7 10/08/2024    CREATININE 0.60 10/08/2024     Lab Results   Component Value Date    TROPHS <3 10/07/2024     Lab Results   Component Value Date    BNP 15 10/07/2024     Lab Results   Component Value Date    DDIMERVTE 1,193 (H) 10/07/2024     Acetaminophen level: 23   Imaging -   None    Interventions -   Medications   sodium chloride 0.9 % bolus 1,000 mL (0 mL intravenous Stopped 10/8/24 1835)   ketorolac (Toradol) injection 30 mg  (30 mg intravenous Given 10/8/24 1858)   acetylcysteine (Acetadote) 10.4 g in dextrose 5% 200 mL infusion 200mL/hr (Given 10/8/24 1928 stopped )   diphenhydrAMINE (BENADryl) injection 50 mg (50 mg intravenous Given 10/8/24 2037)   famotidine PF (Pepcid) injection 20 mg (20 mg intravenous Given 10/8/24 2045)   methylPREDNISolone sod succinate (SOLU-Medrol) injection 60 mg (60 mg intravenous Given 10/8/24 2045)     PMHx  See HPI    Surgical Hx  Past Surgical History:   Procedure Laterality Date    OTHER SURGICAL HISTORY  2018    Laparoscopic Aspiration Uterine Mass    OTHER SURGICAL HISTORY  2020     section    TONSILLECTOMY  2015    Tonsillectomy     Family Hx  Family History   Problem Relation    Other (cardiac disorder) Mother    Other (cardiac disorder) Father    Other (cardiac disorder) Maternal Grandmother    Diabetes Maternal Grandmother    Cancer Maternal Grandmother    Other (cardiac disorder) Paternal Grandmother    Diabetes Paternal Grandmother    Cancer Paternal Grandmother     Social Hx  Social History     Tobacco Use    Smoking status: Never    Smokeless tobacco: Never   Vaping Use    Vaping status: Never Used   Substance Use Topics    Alcohol use: Yes     Comment: socially    Drug use: Never     Allergies  Allergies   Allergen Reactions    Acetylcysteine Anaphylaxis    Progesterone Hives      Meds  Scheduled medications  amoxicillin-pot clavulanate, 1 tablet, oral, q12h DARIEN  doxycycline, 100 mg, oral, BID  hydrOXYzine HCL, 50 mg, oral, Once  [START ON 10/9/2024] polyethylene glycol, 17 g, oral, Daily  [START ON 10/9/2024] rosuvastatin, 5 mg, oral, Daily    Objective   Physical Exam  Constitutional:       General: She is not in acute distress.     Appearance: Normal appearance. She is not ill-appearing.   HENT:      Head: Normocephalic and atraumatic.      Nose: Nose normal.      Mouth/Throat:      Mouth: Mucous membranes are moist.      Pharynx: Oropharynx is clear.   Eyes:       Extraocular Movements: Extraocular movements intact.      Pupils: Pupils are equal, round, and reactive to light.   Cardiovascular:      Rate and Rhythm: Regular rhythm. Tachycardia present.      Heart sounds: Normal heart sounds.   Pulmonary:      Effort: Pulmonary effort is normal.      Breath sounds: Normal breath sounds.   Abdominal:      General: Abdomen is flat. Bowel sounds are normal. There is no distension.      Palpations: Abdomen is soft.      Tenderness: There is no abdominal tenderness. There is no guarding or rebound.   Musculoskeletal:         General: Normal range of motion.      Right lower leg: No edema.      Left lower leg: No edema.   Skin:     General: Skin is warm and dry.      Capillary Refill: Capillary refill takes less than 2 seconds.      Findings: No erythema or rash.   Neurological:      General: No focal deficit present.      Mental Status: She is alert and oriented to person, place, and time. Mental status is at baseline.   Psychiatric:         Mood and Affect: Mood is anxious.     Temp:  [36.8 °C (98.2 °F)-37.2 °C (99 °F)] 36.8 °C (98.2 °F)  Heart Rate:  [] 108  Resp:  [18-20] 18  BP: (118-151)/() 121/85  Vitals:    10/08/24 2214   Weight: 72.6 kg (160 lb)     Labs:   CBC:  Recent Labs     10/08/24  1739 10/07/24  1801 01/05/24  0807   WBC 4.1* 4.7 4.3*   HGB 13.0 13.7 13.3   HCT 38.8 39.6 41.0   * 145* 195   MCV 86 85 88     CMP:  Recent Labs     10/08/24  1739 10/07/24  1801 06/19/24  0919    136 136   K 3.5 3.5 4.2    101 104   CO2 24 22 21   ANIONGAP 16 17 15   BUN 7 8 15   CREATININE 0.60 0.59 0.62   EGFR >90 >90 >90   GLUCOSE 90 88 70*     Recent Labs     10/08/24  1739 10/07/24  1801 06/19/24  0919 10/24/21  0510 04/16/21  2140   ALBUMIN 4.0 4.1 4.2   < > 4.1   ALKPHOS 62 58 54   < > 97   ALT 19 18 13   < > 15   AST 27 28 23   < > 16   BILITOT 0.5 0.8 0.8   < > 0.7   LIPASE  --   --   --   --  11    < > = values in this interval not  displayed.     Calcium/Phos:   Lab Results   Component Value Date    CALCIUM 9.1 10/08/2024      COAG:   Recent Labs     10/08/24  1848 10/07/24  1801 10/24/21  0510   INR 1.0 1.1 1.1   DDIMERVTE  --  1,193* 2,931*     CRP:   Lab Results   Component Value Date    CRP 0.86 12/31/2021      ENDO:  Recent Labs     01/05/24  0807 05/05/23  0954 12/31/21  0907 10/24/21  0510 04/17/20  1301   TSH 1.34 0.86 1.67 1.00  --    HGBA1C  --   --   --   --  4.4      CARDIAC:   Recent Labs     10/07/24  1920 10/07/24  1801   TROPHS <3 <3   BNP  --  15     Micro/ID:     Recent Labs     10/07/24  1801   FLUAFLUVID Not Detected   FLUBFLUVID Not Detected   [id number] Not Detected     Blood cultures x2, sputum cultures ordered  RSV screen ordered  MRSA screen ordered  Urine legionella and urine strep pneumo antigen ordered    Imaging  CT angio chest for pulmonary embolism  Result Date: 10/7/2024  1. Dense consolidation left lower lobe compatible with pneumonia. Advise follow-up to resolution. 2. Minimally enlarged mediastinal lymph nodes. 3. No pleural effusion. 4. No detectable pulmonary emboli. 5. Remainder as above.    XR chest 1 view    Result Date: 10/7/2024  Left lower lobe consolidation concerning for pneumonia. 2 cm nodular opacity in the right lower lobe is grossly stable.    Assessment/Plan   Karen Thomson is a 39 y.o. female presenting for unintentional acetaminophen overdose (9 grams over 24 hours). History of anorexia nervosa, hyperlipidemia, stable pulmonary nodule, and recently diagnosed bacterial CAP. She was started on acetylcysteine given her acetaminophen level of 23, however her treatment course was complicated by a presumed allergic reaction which began 1 hour after initiation of acetylcysteine. Her symptoms resolved with administration of diphenhydramine, methylprednisolone, and famotidine. She did not have a new oxygen requirement and did not require an airway. Given her low risk, poison control recommended  either continuing treatment at a lower rate or stopping treatment and checking labs in the morning. The patient strongly preferred to stop treatment. Regarding the patient's constitutional symptoms, they are likely secondary to acute bacterial pneumonia. However, her frequent infections and pulmonary nodule may indicate an occult process and may warrant outpatient workup with a pulmonology consult. At this time, favor reactive airway 2/2 allergic/viral process predisposing the patient to bacterial infection. Weight loss is intentional, but PCP did begin a workup due to dyspnea in 4/2024.     Acute medical issues  # Unintentional acetaminophen overdose  # Allergic reaction to acetylcysteine  - Stop acetylcysteine at this time  - Tryptase at 2230 on 10/8 and another ordered for 2230 on 10/9 (can be done outpatient)  - CMP, acetaminophen, INR levels in the AM  - Telemetry  - Pulse ox    # CAP  # Sinus tachycardia  # Diaphoresis/weight loss  - Continue doxy 100 and amox/clav twice daily, consider updating duration  - Add azithromycin 500 for 3 days, DC if atypicals are negative  - Urine legionella and strep  - Blood cultures  - RSV  - MRSA nares  - Consider pulm referral on DC    # Acute situational anxiety  - Hydroxyzine 50mg once    Chronic medical issues  #HLD - continue home rosuvastatin    F: PO  E: Replete as needed  N: Regular  G: None  DVT: None  Abx: Amox/clav (10/7-10/13), doxycycline (10/7-16), azithromycin (10/8-10/10)  Code status: Full Code  NOK:  Primary Emergency Contact: Americo Thomson, Home Phone: [phone]    Dispo: 39F with unintentional acetaminophen overdose and CAP complicated by suspected acute allergic reaction to acetylcysteine. No O2 requirement. Obs, ELOS < 2 midnights DC home no needs.    Rohan Ayala MD MS  PGY2 Internal Medicine

## 2024-10-09 NOTE — DISCHARGE INSTRUCTIONS
You are now stable enough to be discharged home.    The following recommendations are made for you at time of hospital discharge:  - Please take your home medications as instructed prior to hospitalization.   - The following changes to your home medications have been made during your hospital stay:    START taking these medications     dextromethorphan-guaifenesin  mg 12 hr tablet; Commonly known as:   Mucinex DM; Take 1 tablet by mouth 2 times a day as needed for cough. Do   not crush, chew, or split.   hydrOXYzine HCL 25 mg tablet; Commonly known as: Atarax; Take 1 tablet   (25 mg) by mouth every 6 hours if needed for anxiety.    - Please follow-up with your primary care provider within 5-7 days from time of discharge. Likely will need to bring photo ID and insurance card to your appointment.  - Please follow-up with cardiology for Pott's disease workup     -  services has been requested to make an appointment for you however if you do not hear back from them within 2-3 days, please call [phone] to find out about appointments with  services.  - If you experience any worsening symptoms or any new acute concerns arise, please contact your primary care provider to discuss and possibly arrange an appointment. If you cannot get in touch with provider or severe symptoms are present, please return to nearest emergency room/urgent care for evaluation and treatment.

## 2024-10-09 NOTE — CARE PLAN
Problem: Fall/Injury  Goal: Be free from injury by end of the shift  Outcome: Progressing     Problem: Respiratory  Goal: No signs of respiratory distress (eg. Use of accessory muscles. Peds grunting)  Outcome: Progressing   The patient's goals for the shift include  be free from injury    The clinical goals for the shift include  No signs of respiratory distress.

## 2024-10-09 NOTE — HOSPITAL COURSE
Karen Thomson is a 39 y.o. female presenting for unintentional acetaminophen overdose (9 grams over 24 hours). History of anorexia nervosa, hyperlipidemia, stable pulmonary nodule, and recently diagnosed bacterial CAP. She was started on acetylcysteine given her acetaminophen level of 23, however her treatment course was complicated by a presumed allergic reaction which began 1 hour after initiation of acetylcysteine. Her symptoms resolved with administration of diphenhydramine, methylprednisolone, and famotidine.      Tryptase at 2230 on 10/8 and another ordered for 2230 on 10/9 (can be done outpatient).     For her pneumonia doxycycline was discontinued but amox/clav was continued. Azithromycin 500 was added for 3 days.    AST, ALT, and INR were all normal.     On the morning of 10/9, Karen reports no shortness of breath or abdominal pain. She does endorse a nonproductive cough. She denies any lingering throat swelling. She was tachycardic on day of discharge. She also mentioned being anxious about her hospital stay. Orthostatic blood pressure was positive for heart rate. Pt was given a bolus of fluid. After fluid administration pt was agreeable to dc.    Poison control were contacted and her case is closed.    Patient is stable for discharge. Will continue course of augmentin. Will follow up with pcp post hospital stay. Referral to cardiology placed for family hx of Jaeger disease.

## 2024-10-09 NOTE — NURSING NOTE
Discharge instructions reviewed with patient. No questions at this time. Education given for new homegoing medications with type, uses, and most common side effects. Patient verbalized understanding. Telemetry removed and left at nurses station. IV removed. Discharged home in stable condition.

## 2024-10-10 ENCOUNTER — CLINICAL SUPPORT (OUTPATIENT)
Dept: EMERGENCY MEDICINE | Facility: HOSPITAL | Age: 39
End: 2024-10-10
Payer: COMMERCIAL

## 2024-10-10 ENCOUNTER — APPOINTMENT (OUTPATIENT)
Dept: RADIOLOGY | Facility: HOSPITAL | Age: 39
End: 2024-10-10
Payer: COMMERCIAL

## 2024-10-10 ENCOUNTER — HOSPITAL ENCOUNTER (OUTPATIENT)
Facility: HOSPITAL | Age: 39
Setting detail: OBSERVATION
Discharge: HOME | End: 2024-10-12
Attending: STUDENT IN AN ORGANIZED HEALTH CARE EDUCATION/TRAINING PROGRAM | Admitting: INTERNAL MEDICINE
Payer: COMMERCIAL

## 2024-10-10 DIAGNOSIS — R55 POSTURAL DIZZINESS WITH PRESYNCOPE: ICD-10-CM

## 2024-10-10 DIAGNOSIS — F41.9 ANXIOUS MOOD: ICD-10-CM

## 2024-10-10 DIAGNOSIS — R00.0 TACHYCARDIA: Primary | ICD-10-CM

## 2024-10-10 DIAGNOSIS — R42 POSTURAL DIZZINESS WITH PRESYNCOPE: ICD-10-CM

## 2024-10-10 LAB
ALBUMIN SERPL BCP-MCNC: 4 G/DL (ref 3.4–5)
ALP SERPL-CCNC: 61 U/L (ref 33–110)
ALT SERPL W P-5'-P-CCNC: 29 U/L (ref 7–45)
ANION GAP BLDV CALCULATED.4IONS-SCNC: 11 MMOL/L (ref 10–25)
ANION GAP SERPL CALC-SCNC: 12 MMOL/L (ref 10–20)
APPEARANCE UR: CLEAR
AST SERPL W P-5'-P-CCNC: 38 U/L (ref 9–39)
ATRIAL RATE: 242 BPM
ATRIAL RATE: 85 BPM
ATRIAL RATE: 86 BPM
B-HCG SERPL-ACNC: <3 MIU/ML
BASE EXCESS BLDV CALC-SCNC: -1.1 MMOL/L (ref -2–3)
BASOPHILS # BLD AUTO: 0.03 X10*3/UL (ref 0–0.1)
BASOPHILS NFR BLD AUTO: 0.8 %
BILIRUB SERPL-MCNC: 0.4 MG/DL (ref 0–1.2)
BILIRUB UR STRIP.AUTO-MCNC: NEGATIVE MG/DL
BODY TEMPERATURE: 37 DEGREES CELSIUS
BUN SERPL-MCNC: 9 MG/DL (ref 6–23)
CA-I BLDV-SCNC: 1.17 MMOL/L (ref 1.1–1.33)
CALCIUM SERPL-MCNC: 9.2 MG/DL (ref 8.6–10.6)
CHLORIDE BLDV-SCNC: 108 MMOL/L (ref 98–107)
CHLORIDE SERPL-SCNC: 110 MMOL/L (ref 98–107)
CO2 SERPL-SCNC: 26 MMOL/L (ref 21–32)
COLOR UR: COLORLESS
CREAT SERPL-MCNC: 0.59 MG/DL (ref 0.5–1.05)
EGFRCR SERPLBLD CKD-EPI 2021: >90 ML/MIN/1.73M*2
EOSINOPHIL # BLD AUTO: 0.03 X10*3/UL (ref 0–0.7)
EOSINOPHIL NFR BLD AUTO: 0.8 %
ERYTHROCYTE [DISTWIDTH] IN BLOOD BY AUTOMATED COUNT: 12.5 % (ref 11.5–14.5)
GLUCOSE BLDV-MCNC: 87 MG/DL (ref 74–99)
GLUCOSE SERPL-MCNC: 90 MG/DL (ref 74–99)
GLUCOSE UR STRIP.AUTO-MCNC: NORMAL MG/DL
HCO3 BLDV-SCNC: 24.3 MMOL/L (ref 22–26)
HCT VFR BLD AUTO: 40.7 % (ref 36–46)
HCT VFR BLD EST: 41 % (ref 36–46)
HGB BLD-MCNC: 13.5 G/DL (ref 12–16)
HGB BLDV-MCNC: 13.5 G/DL (ref 12–16)
IMM GRANULOCYTES # BLD AUTO: 0 X10*3/UL (ref 0–0.7)
IMM GRANULOCYTES NFR BLD AUTO: 0 % (ref 0–0.9)
INHALED O2 CONCENTRATION: 21 %
KETONES UR STRIP.AUTO-MCNC: NEGATIVE MG/DL
LACTATE BLDV-SCNC: 1.5 MMOL/L (ref 0.4–2)
LEUKOCYTE ESTERASE UR QL STRIP.AUTO: NEGATIVE
LYMPHOCYTES # BLD AUTO: 1.64 X10*3/UL (ref 1.2–4.8)
LYMPHOCYTES NFR BLD AUTO: 44 %
MAGNESIUM SERPL-MCNC: 1.92 MG/DL (ref 1.6–2.4)
MCH RBC QN AUTO: 28.8 PG (ref 26–34)
MCHC RBC AUTO-ENTMCNC: 33.2 G/DL (ref 32–36)
MCV RBC AUTO: 87 FL (ref 80–100)
MONOCYTES # BLD AUTO: 0.41 X10*3/UL (ref 0.1–1)
MONOCYTES NFR BLD AUTO: 11 %
NEUTROPHILS # BLD AUTO: 1.62 X10*3/UL (ref 1.2–7.7)
NEUTROPHILS NFR BLD AUTO: 43.4 %
NITRITE UR QL STRIP.AUTO: NEGATIVE
NRBC BLD-RTO: 0 /100 WBCS (ref 0–0)
OXYHGB MFR BLDV: 60.6 % (ref 45–75)
P AXIS: 38 DEGREES
P AXIS: 41 DEGREES
P AXIS: 44 DEGREES
P OFFSET: 193 MS
P OFFSET: 193 MS
P ONSET: 144 MS
P ONSET: 148 MS
PCO2 BLDV: 42 MM HG (ref 41–51)
PH BLDV: 7.37 PH (ref 7.33–7.43)
PH UR STRIP.AUTO: 7 [PH]
PLATELET # BLD AUTO: 178 X10*3/UL (ref 150–450)
PO2 BLDV: 37 MM HG (ref 35–45)
POTASSIUM BLDV-SCNC: 3.3 MMOL/L (ref 3.5–5.3)
POTASSIUM SERPL-SCNC: 3.2 MMOL/L (ref 3.5–5.3)
PR INTERVAL: 142 MS
PR INTERVAL: 152 MS
PREGNANCY TEST URINE, POC: NEGATIVE
PROT SERPL-MCNC: 6.8 G/DL (ref 6.4–8.2)
PROT UR STRIP.AUTO-MCNC: NEGATIVE MG/DL
Q ONSET: 219 MS
Q ONSET: 220 MS
Q ONSET: 220 MS
QRS COUNT: 14 BEATS
QRS COUNT: 14 BEATS
QRS COUNT: 20 BEATS
QRS DURATION: 78 MS
QRS DURATION: 82 MS
QRS DURATION: 82 MS
QT INTERVAL: 362 MS
QT INTERVAL: 364 MS
QT INTERVAL: 428 MS
QTC CALCULATION(BAZETT): 433 MS
QTC CALCULATION(BAZETT): 433 MS
QTC CALCULATION(BAZETT): 607 MS
QTC FREDERICIA: 408 MS
QTC FREDERICIA: 408 MS
QTC FREDERICIA: 540 MS
R AXIS: -6 DEGREES
R AXIS: 1 DEGREES
R AXIS: 26 DEGREES
RBC # BLD AUTO: 4.69 X10*6/UL (ref 4–5.2)
RBC # UR STRIP.AUTO: NEGATIVE /UL
SAO2 % BLDV: 62 % (ref 45–75)
SODIUM BLDV-SCNC: 140 MMOL/L (ref 136–145)
SODIUM SERPL-SCNC: 145 MMOL/L (ref 136–145)
SP GR UR STRIP.AUTO: 1.01
STAPHYLOCOCCUS SPEC CULT: NORMAL
T AXIS: 0 DEGREES
T AXIS: 36 DEGREES
T AXIS: 49 DEGREES
T OFFSET: 401 MS
T OFFSET: 401 MS
T OFFSET: 434 MS
TRYPTASE SERPL-MCNC: 5.3 UG/L
TSH SERPL-ACNC: 1.71 MIU/L (ref 0.44–3.98)
UROBILINOGEN UR STRIP.AUTO-MCNC: NORMAL MG/DL
VENTRICULAR RATE: 121 BPM
VENTRICULAR RATE: 85 BPM
VENTRICULAR RATE: 86 BPM
WBC # BLD AUTO: 3.7 X10*3/UL (ref 4.4–11.3)

## 2024-10-10 PROCEDURE — 2500000004 HC RX 250 GENERAL PHARMACY W/ HCPCS (ALT 636 FOR OP/ED)

## 2024-10-10 PROCEDURE — 93005 ELECTROCARDIOGRAM TRACING: CPT

## 2024-10-10 PROCEDURE — 36415 COLL VENOUS BLD VENIPUNCTURE: CPT | Performed by: EMERGENCY MEDICINE

## 2024-10-10 PROCEDURE — 96372 THER/PROPH/DIAG INJ SC/IM: CPT

## 2024-10-10 PROCEDURE — 84702 CHORIONIC GONADOTROPIN TEST: CPT

## 2024-10-10 PROCEDURE — 2500000002 HC RX 250 W HCPCS SELF ADMINISTERED DRUGS (ALT 637 FOR MEDICARE OP, ALT 636 FOR OP/ED)

## 2024-10-10 PROCEDURE — 83735 ASSAY OF MAGNESIUM: CPT

## 2024-10-10 PROCEDURE — 80307 DRUG TEST PRSMV CHEM ANLYZR: CPT

## 2024-10-10 PROCEDURE — 85025 COMPLETE CBC W/AUTO DIFF WBC: CPT | Performed by: EMERGENCY MEDICINE

## 2024-10-10 PROCEDURE — 81003 URINALYSIS AUTO W/O SCOPE: CPT

## 2024-10-10 PROCEDURE — 99285 EMERGENCY DEPT VISIT HI MDM: CPT

## 2024-10-10 PROCEDURE — 70450 CT HEAD/BRAIN W/O DYE: CPT

## 2024-10-10 PROCEDURE — 80053 COMPREHEN METABOLIC PANEL: CPT | Performed by: EMERGENCY MEDICINE

## 2024-10-10 PROCEDURE — 81025 URINE PREGNANCY TEST: CPT

## 2024-10-10 PROCEDURE — 36415 COLL VENOUS BLD VENIPUNCTURE: CPT

## 2024-10-10 PROCEDURE — 2500000001 HC RX 250 WO HCPCS SELF ADMINISTERED DRUGS (ALT 637 FOR MEDICARE OP)

## 2024-10-10 PROCEDURE — 82435 ASSAY OF BLOOD CHLORIDE: CPT | Mod: 59

## 2024-10-10 PROCEDURE — G0378 HOSPITAL OBSERVATION PER HR: HCPCS

## 2024-10-10 PROCEDURE — 80053 COMPREHEN METABOLIC PANEL: CPT | Performed by: STUDENT IN AN ORGANIZED HEALTH CARE EDUCATION/TRAINING PROGRAM

## 2024-10-10 PROCEDURE — 84443 ASSAY THYROID STIM HORMONE: CPT

## 2024-10-10 PROCEDURE — 85025 COMPLETE CBC W/AUTO DIFF WBC: CPT | Performed by: STUDENT IN AN ORGANIZED HEALTH CARE EDUCATION/TRAINING PROGRAM

## 2024-10-10 PROCEDURE — 70450 CT HEAD/BRAIN W/O DYE: CPT | Performed by: RADIOLOGY

## 2024-10-10 RX ORDER — AMOXICILLIN AND CLAVULANATE POTASSIUM 875; 125 MG/1; MG/1
1 TABLET, FILM COATED ORAL EVERY 12 HOURS
Status: DISCONTINUED | OUTPATIENT
Start: 2024-10-10 | End: 2024-10-12 | Stop reason: HOSPADM

## 2024-10-10 RX ORDER — ASPIRIN 325 MG
50000 TABLET, DELAYED RELEASE (ENTERIC COATED) ORAL WEEKLY
COMMUNITY

## 2024-10-10 RX ORDER — HYDROXYZINE HYDROCHLORIDE 25 MG/1
25 TABLET, FILM COATED ORAL EVERY 6 HOURS PRN
COMMUNITY

## 2024-10-10 RX ORDER — ASPIRIN 325 MG
50000 TABLET, DELAYED RELEASE (ENTERIC COATED) ORAL WEEKLY
Status: DISCONTINUED | OUTPATIENT
Start: 2024-10-14 | End: 2024-10-12 | Stop reason: HOSPADM

## 2024-10-10 RX ORDER — DOXYCYCLINE 100 MG/1
100 CAPSULE ORAL 2 TIMES DAILY
COMMUNITY
End: 2024-10-11

## 2024-10-10 RX ORDER — LORAZEPAM 0.5 MG/1
1 TABLET ORAL ONCE
Status: COMPLETED | OUTPATIENT
Start: 2024-10-10 | End: 2024-10-10

## 2024-10-10 RX ORDER — ENOXAPARIN SODIUM 100 MG/ML
40 INJECTION SUBCUTANEOUS EVERY 24 HOURS
Status: DISCONTINUED | OUTPATIENT
Start: 2024-10-10 | End: 2024-10-12 | Stop reason: HOSPADM

## 2024-10-10 RX ORDER — ROSUVASTATIN CALCIUM 5 MG/1
5 TABLET, COATED ORAL NIGHTLY
Status: DISCONTINUED | OUTPATIENT
Start: 2024-10-10 | End: 2024-10-12 | Stop reason: HOSPADM

## 2024-10-10 RX ORDER — POLYETHYLENE GLYCOL 3350 17 G/17G
17 POWDER, FOR SOLUTION ORAL DAILY
Status: DISCONTINUED | OUTPATIENT
Start: 2024-10-10 | End: 2024-10-12 | Stop reason: HOSPADM

## 2024-10-10 RX ORDER — LANOLIN ALCOHOL/MO/W.PET/CERES
1000 CREAM (GRAM) TOPICAL DAILY
Status: DISCONTINUED | OUTPATIENT
Start: 2024-10-11 | End: 2024-10-12 | Stop reason: HOSPADM

## 2024-10-10 ASSESSMENT — COLUMBIA-SUICIDE SEVERITY RATING SCALE - C-SSRS
1. IN THE PAST MONTH, HAVE YOU WISHED YOU WERE DEAD OR WISHED YOU COULD GO TO SLEEP AND NOT WAKE UP?: NO
6. HAVE YOU EVER DONE ANYTHING, STARTED TO DO ANYTHING, OR PREPARED TO DO ANYTHING TO END YOUR LIFE?: NO
2. HAVE YOU ACTUALLY HAD ANY THOUGHTS OF KILLING YOURSELF?: NO

## 2024-10-10 ASSESSMENT — LIFESTYLE VARIABLES
EVER FELT BAD OR GUILTY ABOUT YOUR DRINKING: NO
TOTAL SCORE: 0
HAVE YOU EVER FELT YOU SHOULD CUT DOWN ON YOUR DRINKING: NO
EVER HAD A DRINK FIRST THING IN THE MORNING TO STEADY YOUR NERVES TO GET RID OF A HANGOVER: NO
HAVE PEOPLE ANNOYED YOU BY CRITICIZING YOUR DRINKING: NO

## 2024-10-10 ASSESSMENT — PAIN SCALES - GENERAL
PAINLEVEL_OUTOF10: 0 - NO PAIN
PAINLEVEL_OUTOF10: 0 - NO PAIN

## 2024-10-10 ASSESSMENT — PAIN - FUNCTIONAL ASSESSMENT: PAIN_FUNCTIONAL_ASSESSMENT: 0-10

## 2024-10-10 NOTE — ED TRIAGE NOTES
"Pt presents with a c/o heart racing, feeling like she is going to pass out. Pt was discharged from the hospital on yesterday for pneumonia and accidental Tylenol overdose, pt had an allergic reaction to antidote. Pt states she feels dizzy and like she isn't \"making words sometimes\". Pt's speech is clear in triage, no neuro deficits noted, pt denies any pain  "

## 2024-10-10 NOTE — PROGRESS NOTES
---------------------------------------------------------    ATTENDING NOTE for Lester Adams MD:    ATTENDING ATTESTATION:  The patient was seen by the resident/fellow.  I have personally performed a substantive portion of the encounter.  I have seen and examined the patient; agree with the workup, evaluation, MDM, management and diagnosis.  The care plan has been discussed with the resident/fellow; I have reviewed the resident/fellow´s note and agree with the documented findings with the exception/addition of the following:    Patient is a 39-year-old with history of hyperlipidemia and anorexia nervosa who recently was found to have pneumonia and was discharged on antibiotics but then developed mild Tylenol toxicity from try to treat her fever which was treated with N-acetylcysteine complicated by an anaphylactoid reaction who was then discharged yesterday who now presents with persistent dizziness and palpitations.  Patient reports that whenever she stands up she gets dizzy and reports that she feels like she is get a pass out.  She denies any vision change or focal numbness tingling or weakness.  She reports that when she is sitting here, her symptoms improve and resolved.  Denies any history of DVT or PE hemoptysis swelling to legs but did recently travel which was subsequently worked up with a PE scan which was negative.  My suspicion that her symptoms today are due to PE is low.  Stroke seems unlikely.  Given it occurs only when she stands up points away from recurrent TIAs.  Vertebrobasilar insufficiency also seems less likely.    Neuro:  Tracks with eyes, PERRL, EOMI, equal sensation in V1-V3, no facial droop, sounds equal in both ears, 5/5 w/ strength shoulder raise, tongue protrudes at midline, soft palate raises symmetrically 5/5 strength in UE and LE, no deficit with light touch, normal finger to nose and heel to reynolds, negative Romberg, normal gait. NIH 0    Patient has a nonfocal neurologic exam and  is able to ambulate well after show we treated her dizziness with some Ativan here.  She reports she feels better but still has episodes of sinus tachycardia up to the 130s while I was assessing her.  CT head was negative for acute pathology and my suspicion for stroke is low given the reproducibility whenever she stands.  BPPV seems less likely is possible that she has labyrinth-itis in the setting of pneumonia.  Patient denied any history of fluid losses so doubt dehydration.  Will send a TSH as well as obtain a 24-hour urine metanephrine study to look for signs of pheochromocytoma.  Due to the unexplained tachycardia, patient will be admitted for ongoing care given the patient is concerned about her ability to take care of her ADLs given how symptomatic she is with ambulation making outpatient follow-up potentially dangerous.    ---------------------------------------------------------

## 2024-10-10 NOTE — ED PROVIDER NOTES
Emergency Department Provider Note        History of Present Illness     CC: Palpitations and Dizziness     HPI:  This is a 39-year-old female with no significant medical history who presents to the emergency department with dizziness and feeling unwell.  Recently took a vacation in [place] back home over the weekend.  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  She presented to an outside hospital where a CT PE study was obtained.  With the study a pulmonary embolism was ruled out however she was diagnosed with a pneumonia.  While at home, she was taking Tylenol for fevers however was taking it every 3 hours rather than alternating between Tylenol and Motrin.  Once she realized she was taking too much Tylenol she presented to an outside hospital.  Unclear if there were any symptoms at that time however she was empirically treated with N-acetylcysteine and developed an anaphylactoid reaction.  She was admitted to the hospital for observation and discharged yesterday.  States that since she was discharged she continues to be significantly tachycardic and dizzy especially upon changing positions.  She notes feelings that the room is spinning however these are intermittent.  Mostly brought on by going from sitting to standing or changing positions however not every time.  She denies any hearing changes including tinnitus.  She denies any headaches.  She denies any neck pain.  She denies any other infectious symptoms aside from the recent pneumonia.  She denies any abdominal pain.  She denies any nausea or vomiting.  States nothing like this is ever happened to her before and denies a history of vertigo.  No other symptoms at this time.    Limitations to history: None  Independent historian(s): Patient's  at bedside  Records Reviewed: Recent available ED and inpatient notes reviewed in EMR.    PMHx/PSHx:  Per HPI.   - has a past medical history of Acute  pharyngitis, unspecified (2019), Acute upper respiratory infection, unspecified (10/23/2016), Adjustment disorder with depressed mood (2019), Anemia complicating pregnancy, unspecified trimester ([facility]) (2016), Anxiety disorder, unspecified (2019), Body mass index (BMI) 39.0-39.9, adult (2021), Change in bowel habit (2015), Chronic sinusitis, unspecified (2017), Class 1 obesity with body mass index (BMI) of 33.0 to 33.9 in adult (2023), Diarrhea, unspecified (2021), Disease of upper respiratory tract, unspecified (2021), Dry eye syndrome of bilateral lacrimal glands (2017), Encounter for routine postpartum follow-up (2016), Encounter for screening for human immunodeficiency virus (HIV) (2015), Encounter for screening for other infectious and parasitic diseases (2015), Encounter for screening for other viral diseases (2015), Fever, unspecified (2021), Headache, unspecified (2017), History of  section (2020), 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), Localized enlarged lymph nodes (10/20/2016), Low vitamin D level (2023), Metrorrhagia (2023), Obesity, unspecified (10/25/2021), Obesity, unspecified (2021), Ocular pain, left eye (2017), Ocular pain, left eye (2017), Other abnormal and inconclusive findings on diagnostic imaging of breast (2017), Other conditions influencing health status (2017), Other specified health status, Other specified noninflammatory disorders of vagina (08/15/2018), Other specified noninflammatory disorders of vagina, Palpitations (2015), Personal history of diseases of the skin and subcutaneous tissue (2017), Personal history of diseases of the skin and subcutaneous tissue (2018), Personal history of other complications of pregnancy, childbirth and the puerperium (2020), Personal history of other diseases of the female  genital tract (06/27/2015), Personal history of other diseases of the female genital tract (04/05/2018), Personal history of other diseases of the nervous system and sense organs (11/08/2021), Personal history of other diseases of the respiratory system (08/23/2019), Personal history of other diseases of the respiratory system (03/02/2020), Personal history of other diseases of the respiratory system (05/14/2017), Personal history of other diseases of the respiratory system (03/20/2019), Personal history of other diseases of urinary system (04/26/2021), Personal history of other endocrine, nutritional and metabolic disease (06/02/2021), Personal history of other infectious and parasitic diseases (08/23/2019), Personal history of other specified conditions (11/22/2017), Personal history of other specified conditions (12/02/2021), Personal history of other specified conditions (01/13/2015), Personal history of other specified conditions (01/13/2015), Personal history of other specified conditions (10/01/2015), Personal history of pneumonia (recurrent) (12/02/2021), Postpartum depression (06/28/2016), Postpartum depression (04/04/2016), Procreative counseling and advice using natural family planning (12/04/2014), Rash and other nonspecific skin eruption (11/24/2017), and Unspecified acute conjunctivitis, bilateral (03/02/2020).  - has a past surgical history that includes Other surgical history (04/05/2018); Other surgical history (01/20/2020); and Tonsillectomy (01/13/2015).    Medications:  Reviewed in EMR. See EMR for complete list of medications and doses.    Allergies:  Acetylcysteine and Progesterone    Social History:  - Tobacco:  reports that she has never smoked. She has never used smokeless tobacco.   - Alcohol:  reports current alcohol use.   - Illicit Drugs:  reports no history of drug use.     ROS:  Per HPI.       Physical Exam     Triage Vitals:  T 36.8 °C (98.3 °F)  HR (!) 125  /88  RR 17  O2  98 %      General: Patient resting comfortably in bed, no acute distress, breathing easily, well appearing, and appropriately conversational without confusion or gross mental status changes.  Head: Normocephalic. Atraumatic.  Neck: No midline cervical spine tenderness with palpation.  FROM. No gross masses.   Eyes: EOMI. No scleral icterus or injection.  No spontaneous nystagmus.  ENT: Moist mucous membranes, no apparent trauma or lesions.  Negative [place]-Hallpike maneuver.  CV: Regular rhythm. No murmurs, rubs, gallops appreciated. 2+ radial pulses bilaterally.  Resp: Clear to auscultation bilaterally. No respiratory distress.   GI: Soft, non-distended.  No tenderness with palpation.     EXT: No peripheral edema, contusions, or wounds.  Skin: Warm and dry, no rashes or lesions.  Neuro: Alert and oriented.  Cranial nerves II-XII grossly intact.  No focal neurological deficits.  Equal motor strength in bilateral upper and lower extremities.  Sensation intact throughout.  Speech fluent.  Psych: Appropriate mood and behavior, converses and responds appropriately.      Medical Decision Making & ED Course     Labs:   Labs Reviewed   COMPREHENSIVE METABOLIC PANEL - Abnormal       Result Value    Glucose 90      Sodium 145      Potassium 3.2 (*)     Chloride 110 (*)     Bicarbonate 26      Anion Gap 12      Urea Nitrogen 9      Creatinine 0.59      eGFR >90      Calcium 9.2      Albumin 4.0      Alkaline Phosphatase 61      Total Protein 6.8      AST 38      Bilirubin, Total 0.4      ALT 29     CBC WITH AUTO DIFFERENTIAL - Abnormal    WBC 3.7 (*)     nRBC 0.0      RBC 4.69      Hemoglobin 13.5      Hematocrit 40.7      MCV 87      MCH 28.8      MCHC 33.2      RDW 12.5      Platelets 178      Neutrophils % 43.4      Immature Granulocytes %, Automated 0.0      Lymphocytes % 44.0      Monocytes % 11.0      Eosinophils % 0.8      Basophils % 0.8      Neutrophils Absolute 1.62      Immature Granulocytes Absolute, Automated 0.00       Lymphocytes Absolute 1.64      Monocytes Absolute 0.41      Eosinophils Absolute 0.03      Basophils Absolute 0.03     BLOOD GAS VENOUS FULL PANEL - Abnormal    POCT pH, Venous 7.37      POCT pCO2, Venous 42      POCT pO2, Venous 37      POCT SO2, Venous 62      POCT Oxy Hemoglobin, Venous 60.6      POCT Hematocrit Calculated, Venous 41.0      POCT Sodium, Venous 140      POCT Potassium, Venous 3.3 (*)     POCT Chloride, Venous 108 (*)     POCT Ionized Calicum, Venous 1.17      POCT Glucose, Venous 87      POCT Lactate, Venous 1.5      POCT Base Excess, Venous -1.1      POCT HCO3 Calculated, Venous 24.3      POCT Hemoglobin, Venous 13.5      POCT Anion Gap, Venous 11.0      Patient Temperature 37.0      FiO2 21     URINALYSIS WITH REFLEX CULTURE AND MICROSCOPIC - Abnormal    Color, Urine Colorless (*)     Appearance, Urine Clear      Specific Gravity, Urine 1.008      pH, Urine 7.0      Protein, Urine NEGATIVE      Glucose, Urine Normal      Blood, Urine NEGATIVE      Ketones, Urine NEGATIVE      Bilirubin, Urine NEGATIVE      Urobilinogen, Urine Normal      Nitrite, Urine NEGATIVE      Leukocyte Esterase, Urine NEGATIVE     MAGNESIUM - Normal    Magnesium 1.92     POCT PREGNANCY, URINE - Normal    Preg Test, Ur Negative     URINALYSIS WITH REFLEX CULTURE AND MICROSCOPIC    Narrative:     The following orders were created for panel order Urinalysis with Reflex Culture and Microscopic.  Procedure                               Abnormality         Status                     ---------                               -----------         ------                     Urinalysis with Reflex C...[[id number]]  Abnormal            Final result               Extra Urine Gray Tube[[id number]]                            In process                   Please view results for these tests on the individual orders.   EXTRA URINE GRAY TUBE   TSH WITH REFLEX TO FREE T4 IF ABNORMAL   METANEPHRINES, FRACTIONATED, URINE (24 HOUR OR RANDOM)         Imaging:   CT head wo IV contrast   Final Result   No acute intracranial hemorrhage, mass effect, or CT apparent acute   infarct.        MACRO:   None        Signed by: Mark Anthony Corona 10/10/2024 2:55 PM   Dictation workstation:   [id number]           EK: 08: Rate of 121 bpm, regular rhythm, normal axis, [place] interval not calculated however appears less than 200, normal QRS, QTc of 607.    MDM:  This is a 39-year-old female who presents to the emergency department with dizziness and feeling unwell.  She is hemodynamically stable.  Vital signs initially concerning for heart rate of 110 other vitals are normal.  Her physical exam is largely unremarkable.  Differential considered includes benign positional vertigo, labyrinthitis, Ménière's disease, posterior circulation stroke.  Patient's neurological exam is nonfocal and completely intact.  A [place]-Hallpike maneuver was negative for nystagmus or reproduction of any symptoms.  Also considered anxiety as a possible diagnosis.  She had been given Atarax at home however this is not working.  Workup is initiated with labs and CT imaging of her head.  She is treated with Ativan for possible anxiety and may help with her dizziness as well.  On labs she has WBC of 3.7 and no anemia.  Mild electrolyte abnormalities including a potassium of 3.2 however no other derangements.  On VBG there is no acid-base disturbance or lactate elevation.  Urinalysis is negative for signs of urinary tract infection.  Pregnancy test is negative.  A CT head was obtained which is unremarkable.  She reported subjective improvement in her symptoms after low-dose Ativan.  At this time she will be handed off to the oncoming emergency department provider pending reevaluation and vital signs as well as ambulation test.  Given the patient's ongoing tachycardia she may require admission for observation.      ED Course:  ED Course as of 10/10/24 1557   Thu Oct 10, 2024   1523 Received signout on this  patient pending ambulatory trial and reevaluation of vitals following Ativan.  Patient is found to continue to be tachycardic at 130 following Ativan.  No chest pain, shortness of breath, or lightheadedness. [ES]      ED Course User Index  [ES] Robert Escalona MD         Diagnoses as of 10/10/24 1557   Tachycardia   Anxious mood   Postural dizziness with presyncope       Independent Result Review and Interpretation: Relevant laboratory and radiographic results were reviewed and independently interpreted by myself.  As necessary, they are commented on in the ED Course.    Social Determinants Limiting Care:  None identified      Patient seen by and discussed with the attending emergency medicine physician.       Disposition    Sign Out    Maxi Ureña DO   Emergency Medicine PGY-3  [facility]      Procedures      Procedures ? SmartLinks last updated 10/10/2024 3:57 PM        Maxi Ureña DO  Resident  10/10/24 [id number]

## 2024-10-10 NOTE — H&P
"History Of Present Illness    This is a 39-year-old female with PMH of hyperlipidemia who presents to the hospital for dizziness and elevated heart rate.     She was recently admitted to [facility] on 10/7 for fever, chills, SOB, and cough and was found to have pneumonia for which she is being treated with augmentin and doxycycline (later switched to azithromycin). She returned on 10/8 for accidental tylenol overdose as she thought she was alternating tylenol and motrin but she actually was taking generic and brand name tylenol instead. She was started on acetylcysteine given her acetaminophen level of 23, however her treatment course was complicated by a presumed allergic reaction which began 1 hour after initiation of acetylcysteine. Her symptoms resolved with administration of diphenhydramine, methylprednisolone, and famotidine. LFTs/INR wnl.    She presented on 10/10 for dizziness and heart racing/elevated HR. She reports that her symptoms occur when she changes position/sits up and feels the best when she is laying flat. Her dizzines/lightheadedness always occurs when her heart is racing. She denies that the room is spinning but reports that she feels like her head \"is about to fall over\" and describes it as lightheadedness. She denies LOC, falling, head trauma, visual/auditory disturbances. She reports waking up on 10/10 drenched in sweat. She also reports diarrhea since last night and had 2 loose bowel movements. She also reports noticing a change in the color of her skin such that she is more flushed but was recently in [place] last weekend. She endorses 70 lb weight loss over the past 2 years but this is intentional and she started taking Zepbound since June. She denies smoking, recreational drug use, and use of herbs/supplements. Reports occasional social drinking (last drink was last Saturday 5 days ago).     Past Medical History    Hyperlipidemia - on crestor    Surgical History  She has a past surgical " history that includes Other surgical history (04/05/2018); Other surgical history (01/20/2020); and Tonsillectomy (01/13/2015).     Social History  She reports that she has never smoked. She has never used smokeless tobacco. She reports current alcohol use. She reports that she does not use drugs.    Family History  Family History   Problem Relation Name Age of Onset    Other (cardiac disorder) Mother      Other (cardiac disorder) Father      Other (cardiac disorder) Maternal Grandmother      Diabetes Maternal Grandmother      Cancer Maternal Grandmother      Other (cardiac disorder) Paternal Grandmother      Diabetes Paternal Grandmother      Cancer Paternal Grandmother          Allergies  Acetylcysteine and Progesterone    Review of Systems    Negative except as above     Physical Exam    General: Alert, no acute distress, appears well developed/stated age. Flushed skin noticeable.  Head: Normocephalic, atraumatic  Eyes: Nonicteric sclera, EOMI  Neck: Supple; trachea midline  Heart: RRR; no murmurs/rubs/gallops  Lungs: Clear to auscultation bilaterally, nonlabored breathing, on RA  Abdomen: Soft, nondistended. Normal bowel sounds. No tenderness to palpation. No guarding or rebound.  Extremities: No cyanosis or edema.  Pulses: 2+ and symmetric  Skin: No rashes or lesions on exposed skin. Flushed skin noticeable  Psych: Normal mood and affect; normal speech. Pleasant and cooperative  Neuro: Alert & oriented x3; No slurring noted. Moving extremities appropriately.     Last Recorded Vitals  BP (!) 140/99 (BP Location: Right arm, Patient Position: Lying)   Pulse (!) 110   Temp 36.8 °C (98.3 °F) (Temporal)   Resp 12   Wt 68.9 kg (152 lb)   SpO2 100%     Relevant Results    Results for orders placed or performed during the hospital encounter of 10/10/24 (from the past 24 hour(s))   Comprehensive metabolic panel   Result Value Ref Range    Glucose 90 74 - 99 mg/dL    Sodium 145 136 - 145 mmol/L    Potassium 3.2 (L)  3.5 - 5.3 mmol/L    Chloride 110 (H) 98 - 107 mmol/L    Bicarbonate 26 21 - 32 mmol/L    Anion Gap 12 10 - 20 mmol/L    Urea Nitrogen 9 6 - 23 mg/dL    Creatinine 0.59 0.50 - 1.05 mg/dL    eGFR >90 >60 mL/min/1.73m*2    Calcium 9.2 8.6 - 10.6 mg/dL    Albumin 4.0 3.4 - 5.0 g/dL    Alkaline Phosphatase 61 33 - 110 U/L    Total Protein 6.8 6.4 - 8.2 g/dL    AST 38 9 - 39 U/L    Bilirubin, Total 0.4 0.0 - 1.2 mg/dL    ALT 29 7 - 45 U/L   CBC and Auto Differential   Result Value Ref Range    WBC 3.7 (L) 4.4 - 11.3 x10*3/uL    nRBC 0.0 0.0 - 0.0 /100 WBCs    RBC 4.69 4.00 - 5.20 x10*6/uL    Hemoglobin 13.5 12.0 - 16.0 g/dL    Hematocrit 40.7 36.0 - 46.0 %    MCV 87 80 - 100 fL    MCH 28.8 26.0 - 34.0 pg    MCHC 33.2 32.0 - 36.0 g/dL    RDW 12.5 11.5 - 14.5 %    Platelets 178 150 - 450 x10*3/uL    Neutrophils % 43.4 40.0 - 80.0 %    Immature Granulocytes %, Automated 0.0 0.0 - 0.9 %    Lymphocytes % 44.0 13.0 - 44.0 %    Monocytes % 11.0 2.0 - 10.0 %    Eosinophils % 0.8 0.0 - 6.0 %    Basophils % 0.8 0.0 - 2.0 %    Neutrophils Absolute 1.62 1.20 - 7.70 x10*3/uL    Immature Granulocytes Absolute, Automated 0.00 0.00 - 0.70 x10*3/uL    Lymphocytes Absolute 1.64 1.20 - 4.80 x10*3/uL    Monocytes Absolute 0.41 0.10 - 1.00 x10*3/uL    Eosinophils Absolute 0.03 0.00 - 0.70 x10*3/uL    Basophils Absolute 0.03 0.00 - 0.10 x10*3/uL   Magnesium   Result Value Ref Range    Magnesium 1.92 1.60 - 2.40 mg/dL   TSH with reflex to Free T4 if abnormal   Result Value Ref Range    Thyroid Stimulating Hormone 1.71 0.44 - 3.98 mIU/L   Urinalysis with Reflex Culture and Microscopic   Result Value Ref Range    Color, Urine Colorless (N) Light-Yellow, Yellow, Dark-Yellow    Appearance, Urine Clear Clear    Specific Gravity, Urine 1.008 1.005 - 1.035    pH, Urine 7.0 5.0, 5.5, 6.0, 6.5, 7.0, 7.5, 8.0    Protein, Urine NEGATIVE NEGATIVE, 10 (TRACE), 20 (TRACE) mg/dL    Glucose, Urine Normal Normal mg/dL    Blood, Urine NEGATIVE NEGATIVE     Ketones, Urine NEGATIVE NEGATIVE mg/dL    Bilirubin, Urine NEGATIVE NEGATIVE    Urobilinogen, Urine Normal Normal mg/dL    Nitrite, Urine NEGATIVE NEGATIVE    Leukocyte Esterase, Urine NEGATIVE NEGATIVE   POCT pregnancy, urine   Result Value Ref Range    Preg Test, Ur Negative Negative   Blood Gas Venous Full Panel   Result Value Ref Range    POCT pH, Venous 7.37 7.33 - 7.43 pH    POCT pCO2, Venous 42 41 - 51 mm Hg    POCT pO2, Venous 37 35 - 45 mm Hg    POCT SO2, Venous 62 45 - 75 %    POCT Oxy Hemoglobin, Venous 60.6 45.0 - 75.0 %    POCT Hematocrit Calculated, Venous 41.0 36.0 - 46.0 %    POCT Sodium, Venous 140 136 - 145 mmol/L    POCT Potassium, Venous 3.3 (L) 3.5 - 5.3 mmol/L    POCT Chloride, Venous 108 (H) 98 - 107 mmol/L    POCT Ionized Calicum, Venous 1.17 1.10 - 1.33 mmol/L    POCT Glucose, Venous 87 74 - 99 mg/dL    POCT Lactate, Venous 1.5 0.4 - 2.0 mmol/L    POCT Base Excess, Venous -1.1 -2.0 - 3.0 mmol/L    POCT HCO3 Calculated, Venous 24.3 22.0 - 26.0 mmol/L    POCT Hemoglobin, Venous 13.5 12.0 - 16.0 g/dL    POCT Anion Gap, Venous 11.0 10.0 - 25.0 mmol/L    Patient Temperature 37.0 degrees Celsius    FiO2 21 %     CT head wo IV contrast    Result Date: 10/10/2024  Interpreted By:  Mark Anthony Corona and Hooper Grayson STUDY: CT HEAD WO IV CONTRAST;  10/10/2024 2:21 pm   INDICATION: Signs/Symptoms:confusion, dizziness.     COMPARISON: CT of the head dated January 25, 2017.   ACCESSION NUMBER(S): [id number]   ORDERING CLINICIAN: CHANDLER VALDIVIA   TECHNIQUE: Noncontrast axial CT scan of head was performed.   FINDINGS: Parenchyma: There is no intracranial hemorrhage. The grey-white differentiation is intact. There is no mass effect or midline shift.   CSF Spaces: The ventricles, sulci and basal cisterns are within normal limits for age.   Extra-Axial Fluid: There is no extraaxial fluid collection.   Calvarium: The calvarium is unremarkable.   Paranasal sinuses: Visualized paranasal sinuses are clear.    Mastoids: Clear.   Orbits: Normal.   Soft tissues: Unremarkable.       No acute intracranial hemorrhage, mass effect, or CT apparent acute infarct.   MACRO: None   Signed by: Mark Anthony Corona 10/10/2024 2:55 PM Dictation workstation:   [id number]    Electrocardiogram, 12-lead PRN ACS symptoms    Result Date: 10/9/2024  Sinus tachycardia Possible Left atrial enlargement Low voltage QRS Nonspecific ST abnormality Abnormal ECG When compared with ECG of 08-OCT-2024 17:43, No significant change was found    ECG 12 lead    Result Date: 10/9/2024  Normal sinus rhythm with sinus arrhythmia Low voltage QRS Cannot rule out Anterior infarct (cited on or before 24-OCT-2021) Abnormal ECG When compared with ECG of 07-OCT-2024 17:24, (unconfirmed) No significant change was found See ED provider note for full interpretation and clinical correlation Confirmed by Emmie Claros ([id number]) on 10/9/2024 11:11:37 AM     Assessment & Plan    This is a 39-year-old female with PMH of hyperlipidemia and is currently undergoing treatment for bacterial CAP who presents with lightheadedness and elevated HR. Her paroxysmal episodes are suggestive of an underlying endocrine pathology such as carcinoid syndrome (especially in the setting of her flushing and diarrhea) vs pheochromocytoma - will obtain urinary metanephrines and 5-HIAA. Differential includes cardiac arrhythmia although less likely with NSR - will obtain serial EKGs.    Plan    #Lightheadedness  #Tachycardia  #Flushing  ::TSH 1.71  -follow urinary 5-HIAA results  -follow urinary metanephrines  -follow UDS  -serial EKGs & monitor telemetry  -hold tylenol (may be masking fevers)    #CAP  -c/w augmentin  -c/w mucinex    #HLD  -c/w crestor    #Miscellaneous  -c/w B12  -c/w atarax PRN  -hold Zepbound    Fluids: PRN  Electrolytes: PRN  Nutrition:  Regular Diet  DVT ppx: none  GI ppx: none  Bowel care:  Miralax 17g      Philip Jalloh, PGY1

## 2024-10-10 NOTE — PROGRESS NOTES
Emergency Medicine Transition of Care Note.    I received Karen Thomson in signout from Dr. Ureña.  Please see the previous ED provider note for all HPI, PE and MDM up to the time of signout at 1500. This is in addition to the primary record.    In brief Karen Thomson is an 39 y.o. female presenting for   Chief Complaint   Patient presents with    Palpitations    Dizziness     At the time of signout we were awaiting: Ambulatory trial and reevaluation of vitals following ativan.    ED Course as of 10/10/24 1947   Thu Oct 10, 2024   1523 Received signout on this patient pending ambulatory trial and reevaluation of vitals following Ativan.  Patient is found to continue to be tachycardic at 130 following Ativan.  No chest pain, shortness of breath, or lightheadedness. [ES]      ED Course User Index  [ES] Robert Escalona MD         Diagnoses as of 10/10/24 1947   Tachycardia   Anxious mood   Postural dizziness with presyncope       Medical Decision Making  This 39-year-old female presented to the ED with complaint of palpitations and presyncope.  Patient did ambulate to the restroom earlier in this visit after receiving Ativan but could not tolerate repeat ambulatory trial with attending physician.  States that she feels heart palpitations and lightheadedness.  She again describes to me that today she suddenly had heart palpitations and felt anxious despite taking Atarax this morning and did not have any trigger for her symptoms.  No recent stressors.  No headache or loss of vision.  Denies chest pain, shortness of breath, weakness, or systemic symptoms.  States that she felt this way once 20 years ago and she saw her cardiologist a cardiologist and wore a Holter monitor without any findings appreciated.  Reports she has not had the symptoms since until being treated for pneumonia this Monday with Augmentin.  She continues to be tachycardic in the room but is in no acute distress.  TSH lab ordered to assess  for hyperthyroidism.  We also have concern for medulloblastoma.  24-hour urine metanephrine test ordered.  Patient admitted to the medicine service for further workup of her symptoms as patient is not safe to return home at this time with persistent tachycardia and difficulty ambulating.        Final diagnoses:   [R00.0] Tachycardia   [F41.9] Anxious mood   [R42, R55] Postural dizziness with presyncope           Procedure  Procedures    Robert Escalona MD   Emergency medicine, PGY3

## 2024-10-10 NOTE — PROGRESS NOTES
Pharmacy Medication History Review    Karen Thomson is a 39 y.o. female admitted for Tachycardia. Pharmacy reviewed the patient's [id number] medications and allergies for accuracy.        The list below reflects the updated PTA list.   Prior to Admission Medications   Prescriptions Last Dose Informant   amoxicillin-pot clavulanate (Augmentin) 875-125 mg tablet 10/10/2024 Self   Sig: Take 1 tablet by mouth every 12 hours for 7 days.   cholecalciferol (Vitamin D-3) 50,000 unit capsule Past Week Self   Sig: Take 1 capsule (50,000 Units) by mouth 1 (one) time per week.   cyanocobalamin (Vitamin B-12) 1,000 mcg tablet Past Week Self   Sig: Take 1 tablet (1,000 mcg) by mouth once daily.   dextromethorphan-guaifenesin (Mucinex DM)  mg 12 hr tablet 10/10/2024 Self   Sig: Take 1 tablet by mouth 2 times a day as needed for cough. Do not crush, chew, or split.   doxycycline (Monodox) 100 mg capsule  Self   Sig: Take 1 capsule (100 mg) by mouth 2 times a day. Take with at least 8 ounces (large glass) of water, do not lie down for 30 minutes after   hydrOXYzine HCL (Atarax) 25 mg tablet  Self   Sig: Take 1 tablet (25 mg) by mouth every 6 hours if needed for anxiety.   multivitamin tablet Past Week Self   Sig: Take 1 tablet by mouth once daily.   rosuvastatin (Crestor) 5 mg tablet 10/9/2024 Self   Sig: Take 1 tablet (5 mg) by mouth once daily.   tirzepatide 15 mg/0.5 mL pen injector  Self   Sig: Inject 15 mg under the skin every 7 days.      Facility-Administered Medications: None        The list below reflects the updated allergy list. Please review each documented allergy for additional clarification and justification.  Allergies  Reviewed by Steven Vazquez on 10/10/2024        Severity Reactions Comments    Acetylcysteine High Anaphylaxis     Progesterone Low Hives             Patient accepts M2B at discharge.     Sources:   -Patient interview, good historian   -Outpatient pharmacy dispense  "history  -[facility]RS  Additional Comments:  none      ROGELIO GRANTWHITE  Pharmacy Technician  10/10/24     Secure Chat preferred   If no response call [phone] or Vocera \"Med Rec\"  "

## 2024-10-11 ENCOUNTER — CLINICAL SUPPORT (OUTPATIENT)
Dept: EMERGENCY MEDICINE | Facility: HOSPITAL | Age: 39
End: 2024-10-11
Payer: COMMERCIAL

## 2024-10-11 ENCOUNTER — APPOINTMENT (OUTPATIENT)
Dept: CARDIOLOGY | Facility: HOSPITAL | Age: 39
End: 2024-10-11
Payer: COMMERCIAL

## 2024-10-11 LAB
ALBUMIN SERPL BCP-MCNC: 3.6 G/DL (ref 3.4–5)
ALP SERPL-CCNC: 51 U/L (ref 33–110)
ALT SERPL W P-5'-P-CCNC: 30 U/L (ref 7–45)
AMPHETAMINES UR QL SCN: NORMAL
ANION GAP SERPL CALC-SCNC: 12 MMOL/L (ref 10–20)
AST SERPL W P-5'-P-CCNC: 35 U/L (ref 9–39)
BARBITURATES UR QL SCN: NORMAL
BENZODIAZ UR QL SCN: NORMAL
BILIRUB DIRECT SERPL-MCNC: 0.1 MG/DL (ref 0–0.3)
BILIRUB SERPL-MCNC: 0.5 MG/DL (ref 0–1.2)
BODY SURFACE AREA: 1.75 M2
BUN SERPL-MCNC: 9 MG/DL (ref 6–23)
BZE UR QL SCN: NORMAL
CALCIUM SERPL-MCNC: 8.5 MG/DL (ref 8.6–10.6)
CANNABINOIDS UR QL SCN: NORMAL
CHLORIDE SERPL-SCNC: 108 MMOL/L (ref 98–107)
CO2 SERPL-SCNC: 25 MMOL/L (ref 21–32)
CREAT SERPL-MCNC: 0.6 MG/DL (ref 0.5–1.05)
EGFRCR SERPLBLD CKD-EPI 2021: >90 ML/MIN/1.73M*2
ERYTHROCYTE [DISTWIDTH] IN BLOOD BY AUTOMATED COUNT: 12.5 % (ref 11.5–14.5)
FENTANYL+NORFENTANYL UR QL SCN: NORMAL
GLUCOSE SERPL-MCNC: 82 MG/DL (ref 74–99)
HCT VFR BLD AUTO: 35.3 % (ref 36–46)
HGB BLD-MCNC: 12 G/DL (ref 12–16)
HOLD SPECIMEN: NORMAL
MAGNESIUM SERPL-MCNC: 2 MG/DL (ref 1.6–2.4)
MCH RBC QN AUTO: 29.1 PG (ref 26–34)
MCHC RBC AUTO-ENTMCNC: 34 G/DL (ref 32–36)
MCV RBC AUTO: 86 FL (ref 80–100)
METHADONE UR QL SCN: NORMAL
NRBC BLD-RTO: 0 /100 WBCS (ref 0–0)
OPIATES UR QL SCN: NORMAL
OXYCODONE+OXYMORPHONE UR QL SCN: NORMAL
PCP UR QL SCN: NORMAL
PHOSPHATE SERPL-MCNC: 3.8 MG/DL (ref 2.5–4.9)
PLATELET # BLD AUTO: 197 X10*3/UL (ref 150–450)
POTASSIUM SERPL-SCNC: 3.7 MMOL/L (ref 3.5–5.3)
PROT SERPL-MCNC: 6 G/DL (ref 6.4–8.2)
RBC # BLD AUTO: 4.12 X10*6/UL (ref 4–5.2)
SODIUM SERPL-SCNC: 141 MMOL/L (ref 136–145)
WBC # BLD AUTO: 4.2 X10*3/UL (ref 4.4–11.3)

## 2024-10-11 PROCEDURE — 84100 ASSAY OF PHOSPHORUS: CPT

## 2024-10-11 PROCEDURE — 83735 ASSAY OF MAGNESIUM: CPT

## 2024-10-11 PROCEDURE — 2500000001 HC RX 250 WO HCPCS SELF ADMINISTERED DRUGS (ALT 637 FOR MEDICARE OP)

## 2024-10-11 PROCEDURE — 85027 COMPLETE CBC AUTOMATED: CPT

## 2024-10-11 PROCEDURE — 93005 ELECTROCARDIOGRAM TRACING: CPT

## 2024-10-11 PROCEDURE — G0378 HOSPITAL OBSERVATION PER HR: HCPCS

## 2024-10-11 PROCEDURE — 2500000002 HC RX 250 W HCPCS SELF ADMINISTERED DRUGS (ALT 637 FOR MEDICARE OP, ALT 636 FOR OP/ED)

## 2024-10-11 PROCEDURE — 80053 COMPREHEN METABOLIC PANEL: CPT

## 2024-10-11 PROCEDURE — 2500000004 HC RX 250 GENERAL PHARMACY W/ HCPCS (ALT 636 FOR OP/ED)

## 2024-10-11 PROCEDURE — 99223 1ST HOSP IP/OBS HIGH 75: CPT

## 2024-10-11 PROCEDURE — 93325 DOPPLER ECHO COLOR FLOW MAPG: CPT | Performed by: STUDENT IN AN ORGANIZED HEALTH CARE EDUCATION/TRAINING PROGRAM

## 2024-10-11 PROCEDURE — 96372 THER/PROPH/DIAG INJ SC/IM: CPT

## 2024-10-11 PROCEDURE — 83497 ASSAY OF 5-HIAA: CPT

## 2024-10-11 PROCEDURE — 93308 TTE F-UP OR LMTD: CPT | Performed by: STUDENT IN AN ORGANIZED HEALTH CARE EDUCATION/TRAINING PROGRAM

## 2024-10-11 PROCEDURE — 80076 HEPATIC FUNCTION PANEL: CPT

## 2024-10-11 PROCEDURE — 36415 COLL VENOUS BLD VENIPUNCTURE: CPT

## 2024-10-11 PROCEDURE — 83835 ASSAY OF METANEPHRINES: CPT

## 2024-10-11 PROCEDURE — 93321 DOPPLER ECHO F-UP/LMTD STD: CPT | Performed by: STUDENT IN AN ORGANIZED HEALTH CARE EDUCATION/TRAINING PROGRAM

## 2024-10-11 PROCEDURE — 93321 DOPPLER ECHO F-UP/LMTD STD: CPT

## 2024-10-11 RX ORDER — POTASSIUM CHLORIDE 20 MEQ/1
40 TABLET, EXTENDED RELEASE ORAL ONCE
Status: COMPLETED | OUTPATIENT
Start: 2024-10-11 | End: 2024-10-11

## 2024-10-11 RX ORDER — HYDROXYZINE HYDROCHLORIDE 25 MG/1
25 TABLET, FILM COATED ORAL EVERY 6 HOURS PRN
Status: DISCONTINUED | OUTPATIENT
Start: 2024-10-11 | End: 2024-10-12 | Stop reason: HOSPADM

## 2024-10-11 SDOH — ECONOMIC STABILITY: INCOME INSECURITY: IN THE PAST 12 MONTHS HAS THE ELECTRIC, GAS, OIL, OR WATER COMPANY THREATENED TO SHUT OFF SERVICES IN YOUR HOME?: NO

## 2024-10-11 SDOH — HEALTH STABILITY: MENTAL HEALTH: HOW OFTEN DO YOU HAVE A DRINK CONTAINING ALCOHOL?: NEVER

## 2024-10-11 SDOH — ECONOMIC STABILITY: FOOD INSECURITY: WITHIN THE PAST 12 MONTHS, YOU WORRIED THAT YOUR FOOD WOULD RUN OUT BEFORE YOU GOT THE MONEY TO BUY MORE.: NEVER TRUE

## 2024-10-11 SDOH — HEALTH STABILITY: MENTAL HEALTH: HOW OFTEN DO YOU HAVE 6 OR MORE DRINKS ON ONE OCCASION?: NEVER

## 2024-10-11 SDOH — ECONOMIC STABILITY: FOOD INSECURITY: WITHIN THE PAST 12 MONTHS, THE FOOD YOU BOUGHT JUST DIDN'T LAST AND YOU DIDN'T HAVE MONEY TO GET MORE.: NEVER TRUE

## 2024-10-11 SDOH — HEALTH STABILITY: MENTAL HEALTH: HOW OFTEN DO YOU HAVE SIX OR MORE DRINKS ON ONE OCCASION?: NEVER

## 2024-10-11 SDOH — SOCIAL STABILITY: SOCIAL INSECURITY: WITHIN THE LAST YEAR, HAVE YOU BEEN AFRAID OF YOUR PARTNER OR EX-PARTNER?: NO

## 2024-10-11 SDOH — SOCIAL STABILITY: SOCIAL INSECURITY: WITHIN THE LAST YEAR, HAVE YOU BEEN HUMILIATED OR EMOTIONALLY ABUSED IN OTHER WAYS BY YOUR PARTNER OR EX-PARTNER?: NO

## 2024-10-11 SDOH — ECONOMIC STABILITY: HOUSING INSECURITY: IN THE PAST 12 MONTHS, HOW MANY TIMES HAVE YOU MOVED WHERE YOU WERE LIVING?: 1

## 2024-10-11 SDOH — ECONOMIC STABILITY: HOUSING INSECURITY: AT ANY TIME IN THE PAST 12 MONTHS, WERE YOU HOMELESS OR LIVING IN A SHELTER (INCLUDING NOW)?: NO

## 2024-10-11 SDOH — SOCIAL STABILITY: SOCIAL INSECURITY: HAVE YOU HAD THOUGHTS OF HARMING ANYONE ELSE?: NO

## 2024-10-11 SDOH — ECONOMIC STABILITY: HOUSING INSECURITY: IN THE LAST 12 MONTHS, WAS THERE A TIME WHEN YOU WERE NOT ABLE TO PAY THE MORTGAGE OR RENT ON TIME?: NO

## 2024-10-11 SDOH — ECONOMIC STABILITY: INCOME INSECURITY: HOW HARD IS IT FOR YOU TO PAY FOR THE VERY BASICS LIKE FOOD, HOUSING, MEDICAL CARE, AND HEATING?: NOT VERY HARD

## 2024-10-11 SDOH — SOCIAL STABILITY: SOCIAL INSECURITY: DO YOU FEEL ANYONE HAS EXPLOITED OR TAKEN ADVANTAGE OF YOU FINANCIALLY OR OF YOUR PERSONAL PROPERTY?: NO

## 2024-10-11 SDOH — HEALTH STABILITY: MENTAL HEALTH: HOW MANY STANDARD DRINKS CONTAINING ALCOHOL DO YOU HAVE ON A TYPICAL DAY?: PATIENT DOES NOT DRINK

## 2024-10-11 SDOH — ECONOMIC STABILITY: FOOD INSECURITY: WITHIN THE PAST 12 MONTHS, YOU WORRIED THAT YOUR FOOD WOULD RUN OUT BEFORE YOU GOT MONEY TO BUY MORE.: NEVER TRUE

## 2024-10-11 SDOH — ECONOMIC STABILITY: INCOME INSECURITY: IN THE PAST 12 MONTHS, HAS THE ELECTRIC, GAS, OIL, OR WATER COMPANY THREATENED TO SHUT OFF SERVICE IN YOUR HOME?: NO

## 2024-10-11 SDOH — SOCIAL STABILITY: SOCIAL INSECURITY: HAVE YOU HAD ANY THOUGHTS OF HARMING ANYONE ELSE?: NO

## 2024-10-11 SDOH — ECONOMIC STABILITY: INCOME INSECURITY: IN THE LAST 12 MONTHS, WAS THERE A TIME WHEN YOU WERE NOT ABLE TO PAY THE MORTGAGE OR RENT ON TIME?: NO

## 2024-10-11 SDOH — SOCIAL STABILITY: SOCIAL INSECURITY
WITHIN THE LAST YEAR, HAVE YOU BEEN RAPED OR FORCED TO HAVE ANY KIND OF SEXUAL ACTIVITY BY YOUR PARTNER OR EX-PARTNER?: NO

## 2024-10-11 SDOH — SOCIAL STABILITY: SOCIAL INSECURITY: WERE YOU ABLE TO COMPLETE ALL THE BEHAVIORAL HEALTH SCREENINGS?: YES

## 2024-10-11 SDOH — ECONOMIC STABILITY: TRANSPORTATION INSECURITY
IN THE PAST 12 MONTHS, HAS THE LACK OF TRANSPORTATION KEPT YOU FROM MEDICAL APPOINTMENTS OR FROM GETTING MEDICATIONS?: NO

## 2024-10-11 SDOH — SOCIAL STABILITY: SOCIAL INSECURITY: DO YOU FEEL UNSAFE GOING BACK TO THE PLACE WHERE YOU ARE LIVING?: NO

## 2024-10-11 SDOH — ECONOMIC STABILITY: FOOD INSECURITY: HOW HARD IS IT FOR YOU TO PAY FOR THE VERY BASICS LIKE FOOD, HOUSING, MEDICAL CARE, AND HEATING?: NOT VERY HARD

## 2024-10-11 SDOH — SOCIAL STABILITY: SOCIAL INSECURITY: HAS ANYONE EVER THREATENED TO HURT YOUR FAMILY OR YOUR PETS?: NO

## 2024-10-11 SDOH — SOCIAL STABILITY: SOCIAL INSECURITY: ABUSE: ADULT

## 2024-10-11 SDOH — HEALTH STABILITY: MENTAL HEALTH: HOW MANY DRINKS CONTAINING ALCOHOL DO YOU HAVE ON A TYPICAL DAY WHEN YOU ARE DRINKING?: PATIENT DOES NOT DRINK

## 2024-10-11 SDOH — ECONOMIC STABILITY: TRANSPORTATION INSECURITY
IN THE PAST 12 MONTHS, HAS LACK OF TRANSPORTATION KEPT YOU FROM MEETINGS, WORK, OR FROM GETTING THINGS NEEDED FOR DAILY LIVING?: NO

## 2024-10-11 SDOH — ECONOMIC STABILITY: TRANSPORTATION INSECURITY: IN THE PAST 12 MONTHS, HAS LACK OF TRANSPORTATION KEPT YOU FROM MEDICAL APPOINTMENTS OR FROM GETTING MEDICATIONS?: NO

## 2024-10-11 SDOH — SOCIAL STABILITY: SOCIAL INSECURITY: DOES ANYONE TRY TO KEEP YOU FROM HAVING/CONTACTING OTHER FRIENDS OR DOING THINGS OUTSIDE YOUR HOME?: NO

## 2024-10-11 SDOH — SOCIAL STABILITY: SOCIAL INSECURITY: ARE THERE ANY APPARENT SIGNS OF INJURIES/BEHAVIORS THAT COULD BE RELATED TO ABUSE/NEGLECT?: NO

## 2024-10-11 SDOH — SOCIAL STABILITY: SOCIAL INSECURITY: ARE YOU OR HAVE YOU BEEN THREATENED OR ABUSED PHYSICALLY, EMOTIONALLY, OR SEXUALLY BY ANYONE?: NO

## 2024-10-11 ASSESSMENT — COGNITIVE AND FUNCTIONAL STATUS - GENERAL
DAILY ACTIVITIY SCORE: 24
MOBILITY SCORE: 24
PATIENT BASELINE BEDBOUND: NO

## 2024-10-11 ASSESSMENT — ACTIVITIES OF DAILY LIVING (ADL)
LACK_OF_TRANSPORTATION: NO
FEEDING YOURSELF: INDEPENDENT
HEARING - RIGHT EAR: FUNCTIONAL
GROOMING: INDEPENDENT
BATHING: INDEPENDENT
WALKS IN HOME: INDEPENDENT
DRESSING YOURSELF: INDEPENDENT
TOILETING: INDEPENDENT
ADEQUATE_TO_COMPLETE_ADL: YES
JUDGMENT_ADEQUATE_SAFELY_COMPLETE_DAILY_ACTIVITIES: YES
PATIENT'S MEMORY ADEQUATE TO SAFELY COMPLETE DAILY ACTIVITIES?: YES
HEARING - LEFT EAR: FUNCTIONAL

## 2024-10-11 ASSESSMENT — LIFESTYLE VARIABLES
SKIP TO QUESTIONS 9-10: 1
AUDIT-C TOTAL SCORE: 0

## 2024-10-11 ASSESSMENT — PATIENT HEALTH QUESTIONNAIRE - PHQ9
SUM OF ALL RESPONSES TO PHQ9 QUESTIONS 1 & 2: 0
2. FEELING DOWN, DEPRESSED OR HOPELESS: NOT AT ALL
1. LITTLE INTEREST OR PLEASURE IN DOING THINGS: NOT AT ALL

## 2024-10-11 ASSESSMENT — PAIN SCALES - GENERAL
PAINLEVEL_OUTOF10: 0 - NO PAIN
PAINLEVEL_OUTOF10: 0 - NO PAIN

## 2024-10-11 ASSESSMENT — PAIN - FUNCTIONAL ASSESSMENT: PAIN_FUNCTIONAL_ASSESSMENT: 0-10

## 2024-10-11 NOTE — PROGRESS NOTES
"Internal Medicine Progress Note    Admission Date: 10/10/2024    Note date: 10/11/2024    Subjective    The patient was seen this morning. She is doing well, had 2 episodes of loose stools yesterday and one this AM. Overnight, afebrile and HDS. On telemetry, noted to have some runs of tachycardia into 120s-130s. No new symptoms or complaints.    Objective    /87   Pulse 96   Temp 36.8 °C (98.3 °F) (Temporal)   Resp 16   Ht 1.6 m (5' 3\")   Wt 68.9 kg (152 lb)   SpO2 100%   BMI 26.93 kg/m²       Intake/Output Summary (Last 24 hours) at 10/11/2024 1317  Last data filed at 10/10/2024 1724  Gross per 24 hour   Intake --   Output 200 ml   Net -200 ml       Physical Exam:    General: Alert, no acute distress, appears well developed/stated age. Flushed skin noticeable.  Head: Normocephalic, atraumatic  Eyes: Nonicteric sclera, EOMI  Neck: Supple; trachea midline  Heart: RRR; no murmurs/rubs/gallops  Lungs: Clear to auscultation bilaterally, nonlabored breathing, on RA  Abdomen: Soft, nondistended. Normal bowel sounds. No tenderness to palpation. No guarding or rebound.  Extremities: No cyanosis or edema.  Pulses: 2+ and symmetric  Skin: No rashes or lesions on exposed skin. Flushed skin noticeable  Psych: Normal mood and affect; normal speech. Pleasant and cooperative  Neuro: Alert & oriented x3; No slurring noted. Moving extremities appropriately.    RECENT LABS:    Results for orders placed or performed during the hospital encounter of 10/10/24 (from the past 24 hour(s))   Urinalysis with Reflex Culture and Microscopic   Result Value Ref Range    Color, Urine Colorless (N) Light-Yellow, Yellow, Dark-Yellow    Appearance, Urine Clear Clear    Specific Gravity, Urine 1.008 1.005 - 1.035    pH, Urine 7.0 5.0, 5.5, 6.0, 6.5, 7.0, 7.5, 8.0    Protein, Urine NEGATIVE NEGATIVE, 10 (TRACE), 20 (TRACE) mg/dL    Glucose, Urine Normal Normal mg/dL    Blood, Urine NEGATIVE NEGATIVE    Ketones, Urine NEGATIVE NEGATIVE " mg/dL    Bilirubin, Urine NEGATIVE NEGATIVE    Urobilinogen, Urine Normal Normal mg/dL    Nitrite, Urine NEGATIVE NEGATIVE    Leukocyte Esterase, Urine NEGATIVE NEGATIVE   Extra Urine Gray Tube   Result Value Ref Range    Extra Tube Hold for add-ons.    Drug Screen, Urine   Result Value Ref Range    Amphetamine Screen, Urine Presumptive Negative Presumptive Negative    Barbiturate Screen, Urine Presumptive Negative Presumptive Negative    Benzodiazepines Screen, Urine Presumptive Negative Presumptive Negative    Cannabinoid Screen, Urine Presumptive Negative Presumptive Negative    Cocaine Metabolite Screen, Urine Presumptive Negative Presumptive Negative    Fentanyl Screen, Urine Presumptive Negative Presumptive Negative    Opiate Screen, Urine Presumptive Negative Presumptive Negative    Oxycodone Screen, Urine Presumptive Negative Presumptive Negative    PCP Screen, Urine Presumptive Negative Presumptive Negative    Methadone Screen, Urine Presumptive Negative Presumptive Negative   POCT pregnancy, urine   Result Value Ref Range    Preg Test, Ur Negative Negative   Blood Gas Venous Full Panel   Result Value Ref Range    POCT pH, Venous 7.37 7.33 - 7.43 pH    POCT pCO2, Venous 42 41 - 51 mm Hg    POCT pO2, Venous 37 35 - 45 mm Hg    POCT SO2, Venous 62 45 - 75 %    POCT Oxy Hemoglobin, Venous 60.6 45.0 - 75.0 %    POCT Hematocrit Calculated, Venous 41.0 36.0 - 46.0 %    POCT Sodium, Venous 140 136 - 145 mmol/L    POCT Potassium, Venous 3.3 (L) 3.5 - 5.3 mmol/L    POCT Chloride, Venous 108 (H) 98 - 107 mmol/L    POCT Ionized Calicum, Venous 1.17 1.10 - 1.33 mmol/L    POCT Glucose, Venous 87 74 - 99 mg/dL    POCT Lactate, Venous 1.5 0.4 - 2.0 mmol/L    POCT Base Excess, Venous -1.1 -2.0 - 3.0 mmol/L    POCT HCO3 Calculated, Venous 24.3 22.0 - 26.0 mmol/L    POCT Hemoglobin, Venous 13.5 12.0 - 16.0 g/dL    POCT Anion Gap, Venous 11.0 10.0 - 25.0 mmol/L    Patient Temperature 37.0 degrees Celsius    FiO2 21 %   hCG,  quantitative, pregnancy   Result Value Ref Range    HCG, Beta-Quantitative <3 <5 mIU/mL   ECG 12 lead   Result Value Ref Range    Ventricular Rate 85 BPM    Atrial Rate 85 BPM    WV Interval 142 ms    QRS Duration 82 ms    QT Interval 364 ms    QTC Calculation(Bazett) 433 ms    P Axis 44 degrees    R Axis -6 degrees    T Axis 49 degrees    QRS Count 14 beats    Q Onset 219 ms    P Onset 148 ms    P Offset 193 ms    T Offset 401 ms    QTC Fredericia 408 ms   ECG 12 lead   Result Value Ref Range    Ventricular Rate 86 BPM    Atrial Rate 86 BPM    WV Interval 152 ms    QRS Duration 82 ms    QT Interval 362 ms    QTC Calculation(Bazett) 433 ms    P Axis 38 degrees    R Axis 26 degrees    T Axis 0 degrees    QRS Count 14 beats    Q Onset 220 ms    P Onset 144 ms    P Offset 193 ms    T Offset 401 ms    QTC Fredericia 408 ms   CBC   Result Value Ref Range    WBC 4.2 (L) 4.4 - 11.3 x10*3/uL    nRBC 0.0 0.0 - 0.0 /100 WBCs    RBC 4.12 4.00 - 5.20 x10*6/uL    Hemoglobin 12.0 12.0 - 16.0 g/dL    Hematocrit 35.3 (L) 36.0 - 46.0 %    MCV 86 80 - 100 fL    MCH 29.1 26.0 - 34.0 pg    MCHC 34.0 32.0 - 36.0 g/dL    RDW 12.5 11.5 - 14.5 %    Platelets 197 150 - 450 x10*3/uL   Magnesium   Result Value Ref Range    Magnesium 2.00 1.60 - 2.40 mg/dL   Hepatic function panel   Result Value Ref Range    Albumin 3.6 3.4 - 5.0 g/dL    Bilirubin, Total 0.5 0.0 - 1.2 mg/dL    Bilirubin, Direct 0.1 0.0 - 0.3 mg/dL    Alkaline Phosphatase 51 33 - 110 U/L    ALT 30 7 - 45 U/L    AST 35 9 - 39 U/L    Total Protein 6.0 (L) 6.4 - 8.2 g/dL   Phosphorus   Result Value Ref Range    Phosphorus 3.8 2.5 - 4.9 mg/dL   Basic Metabolic Panel   Result Value Ref Range    Glucose 82 74 - 99 mg/dL    Sodium 141 136 - 145 mmol/L    Potassium 3.7 3.5 - 5.3 mmol/L    Chloride 108 (H) 98 - 107 mmol/L    Bicarbonate 25 21 - 32 mmol/L    Anion Gap 12 10 - 20 mmol/L    Urea Nitrogen 9 6 - 23 mg/dL    Creatinine 0.60 0.50 - 1.05 mg/dL    eGFR >90 >60 mL/min/1.73m*2     Calcium 8.5 (L) 8.6 - 10.6 mg/dL       IMAGING:    ECG 12 lead    Result Date: 10/10/2024  Atrial flutter with 2:1 AV conduction Possible Inferior infarct , age undetermined Anterior infarct , age undetermined Abnormal ECG When compared with ECG of 09-OCT-2024 09:48, Atrial flutter has replaced Sinus rhythm Anterior infarct is now Present See ED provider note for full interpretation and clinical correlation Confirmed by Fang Carmona ([id number]) on 10/10/2024 11:54:14 PM    ECG 12 lead    Result Date: 10/10/2024  Normal sinus rhythm Normal ECG When compared with ECG of 10-OCT-2024 11:08, Sinus rhythm has replaced Atrial flutter Criteria for Anterior infarct are no longer Present Borderline criteria for Inferior infarct are no longer Present Non-specific change in ST segment in Inferior leads See ED provider note for full interpretation and clinical correlation Confirmed by Fang Carmona ([id number]) on 10/10/2024 11:53:00 PM    ECG 12 lead    Result Date: 10/10/2024  Normal sinus rhythm with sinus arrhythmia Cannot rule out Anterior infarct , age undetermined Abnormal ECG When compared with ECG of 10-OCT-2024 18:23, T wave inversion now evident in Inferior leads See ED provider note for full interpretation and clinical correlation Confirmed by Fang Carmona ([id number]) on 10/10/2024 11:52:15 PM    CT head wo IV contrast    Result Date: 10/10/2024  Interpreted By:  Mark Anthony Corona and Hooper Grayson STUDY: CT HEAD WO IV CONTRAST;  10/10/2024 2:21 pm   INDICATION: Signs/Symptoms:confusion, dizziness.     COMPARISON: CT of the head dated January 25, 2017.   ACCESSION NUMBER(S): [id number]   ORDERING CLINICIAN: CHANDLER VALDIVIA   TECHNIQUE: Noncontrast axial CT scan of head was performed.   FINDINGS: Parenchyma: There is no intracranial hemorrhage. The grey-white differentiation is intact. There is no mass effect or midline shift.   CSF Spaces: The ventricles, sulci and basal cisterns are within normal limits for age.   Extra-Axial  Fluid: There is no extraaxial fluid collection.   Calvarium: The calvarium is unremarkable.   Paranasal sinuses: Visualized paranasal sinuses are clear.   Mastoids: Clear.   Orbits: Normal.   Soft tissues: Unremarkable.       No acute intracranial hemorrhage, mass effect, or CT apparent acute infarct.   MACRO: None   Signed by: Mark Anthony Corona 10/10/2024 2:55 PM Dictation workstation:   [id number]       Scheduled medications  amoxicillin-pot clavulanate, 1 tablet, oral, q12h  [START ON 10/14/2024] cholecalciferol, 50,000 Units, oral, Weekly  cyanocobalamin, 1,000 mcg, oral, Daily  enoxaparin, 40 mg, subcutaneous, q24h  polyethylene glycol, 17 g, oral, Daily  rosuvastatin, 5 mg, oral, Nightly      Continuous medications     PRN medications  PRN medications: dextromethorphan-guaifenesin, hydrOXYzine HCL    Assessment & Plan     This is a 39-year-old female with PMH of hyperlipidemia and is currently undergoing treatment for bacterial CAP who presents with lightheadedness and elevated HR. Her paroxysmal episodes are suggestive of an underlying endocrine pathology such as carcinoid syndrome (especially in the setting of her flushing and diarrhea) vs pheochromocytoma - will obtain urinary metanephrines and 5-HIAA. Differential includes cardiac arrhythmia with HR elevated to 140s-150s which may be suggestive although serial EKGs demonstrate NSR.    Updates 10/11    - serial EKGs unremarkable for rhythm aberrancy  - ordered limited TTE to assess LVEF and wall motion abnormalities  - planning for Holter on discharge vs mail-in  - continue CAP treatment     Plan     #Lightheadedness  #Tachycardia  #Flushing  ::TSH 1.71  ::UDS negative  -follow urinary 5-HIAA results  -follow urinary metanephrines  -monitor telemetry  -hold tylenol (may be masking fevers)     #CAP  -c/w augmentin  -c/w mucinex     #HLD  -c/w crestor     #Miscellaneous  -c/w B12  -c/w atarax PRN  -hold Zepbound     Fluids: PRN  Electrolytes: PRN  Nutrition:   Regular Diet  DVT ppx: none  GI ppx: none  Bowel care:  Miralax 17g      Philip Jalloh, PGY1

## 2024-10-11 NOTE — SIGNIFICANT EVENT
"MEDICINE SENIOR STAFFING NOTE    History    Karen Thomson is a 39 y.o. female with PMHx of PMH of DLD and PNA who presents to the hospital for tachycardia and presyncope.      She was recently admitted to [facility] on 10/7 for fever, chills, SOB, and cough and was found to have pneumonia for which she is being treated with augmentin and doxycycline (later switched to azithromycin). She returned on 10/8 for accidental tylenol overdose as she thought she was alternating tylenol and motrin but she actually was taking generic and brand name tylenol instead. She was started on acetylcysteine given her acetaminophen level of 23, however her treatment course was complicated by a presumed allergic reaction which began 1 hour after initiation of acetylcysteine. Her symptoms resolved with administration of diphenhydramine, methylprednisolone, and famotidine. LFTs/INR wnl.     She presented on 10/10 for dizziness and heart racing/elevated HR. She reports that her symptoms occur when she changes position/sits up and feels the best when she is laying flat. She denies that the room is spinning but reports that she feels like her head \"is about to fall over\" and describes it as lightheadedness. She denies LOC, falling, head trauma, visual/auditory disturbances. She reports waking up on 10/10 drenched in sweat. She also reports diarrhea since last night and had 2 loose bowel movements. She also reports noticing a change in the color of her skin such that she is more flushed but was recently in [place] last weekend. She endorses 70 lb weight loss over the past 2 years but this is intentional and she started taking Zepbound since June. She denies smoking, recreational drug use, and use of herbs/supplements. Reports occasional social drinking (last drink was last Saturday 5 days ago).     Patient denies any fever, chills, lightheadedness, shortness of breath, chest pain, abdominal pain, N/V/C/D, lower extremity " "pain/swelling.       Objective    Vitals:  /78   Pulse 90   Temp 36.8 °C (98.3 °F) (Temporal)   Resp 10   Ht 1.6 m (5' 3\")   Wt 68.9 kg (152 lb)   SpO2 98%   BMI 26.93 kg/m²      Relevant Labs and Imaging:  Results from last 7 days   Lab Units 10/10/24  1120 10/09/24  0617 10/08/24  1739   WBC AUTO x10*3/uL 3.7* 2.2* 4.1*   HEMOGLOBIN g/dL 13.5 13.4 13.0   HEMATOCRIT % 40.7 39.7 38.8   PLATELETS AUTO x10*3/uL 178 153 138*     Results from last 7 days   Lab Units 10/10/24  1120 10/09/24  0617 10/08/24  1739   SODIUM mmol/L 145 139 139   POTASSIUM mmol/L 3.2* 3.9 3.5   CO2 mmol/L 26 20* 24   ANION GAP mmol/L 12 12 16   BUN mg/dL 9 6 7   CREATININE mg/dL 0.59 0.49* 0.60   GLUCOSE mg/dL 90 124* 90   EGFR mL/min/1.73m*2 >90 >90 >90   MAGNESIUM mg/dL 1.92 1.77 2.07   PHOSPHORUS mg/dL  --  2.4*  --       Results from last 7 days   Lab Units 10/10/24  1120 10/09/24  0617 10/08/24  1739   ALT U/L 29 17 19   AST U/L 38 20 27   ALK PHOS U/L 61 55 62      Results from last 7 days   Lab Units 10/09/24  0617 10/08/24  1848 10/07/24  1801   INR  1.1 1.0 1.1     Results from last 7 days   Lab Units 10/10/24  1435   FIO2 % 21     Results from last 7 days   Lab Units 10/10/24  1435   POCT PH, VENOUS pH 7.37   POCT PCO2, VENOUS mm Hg 42     Results from last 7 days   Lab Units 10/08/24  1739 10/07/24  1801   LACTATE mmol/L 0.7 0.7          ED Interventions:  - None      Physical Exam   Constitutional: No acute distress, cooperative   HEENT: No conjunctival icterus, EOMI grossly intact   Cardiovascular: tachycardic with regular rhythm, normal S1/S2, no murmurs noted  Pulmonary: Clear to auscultation b/l, no wheezes/crackles/rhonchi, no increased work of breathing on RA  GI: Soft, non-tender, non-distended, normoactive bowel sounds  Lower extremities: Warm and well perfused, no lower extremity edema  Neuro: A&O x3, able to move all 4 extremities spontaneously  Psych: Appropriate mood and affect   Skin: Flushed most notably " in face but also present on chest/back and b/l UE    Medications  Scheduled medications  amoxicillin-pot clavulanate, 1 tablet, oral, q12h  [START ON 10/14/2024] cholecalciferol, 50,000 Units, oral, Weekly  [START ON 10/11/2024] cyanocobalamin, 1,000 mcg, oral, Daily  enoxaparin, 40 mg, subcutaneous, q24h  polyethylene glycol, 17 g, oral, Daily  rosuvastatin, 5 mg, oral, Nightly      Continuous medications     PRN medications  PRN medications: dextromethorphan-guaifenesin         Assessment/Plan     Karen Thomson is a 39 y.o. female with PMHx of PMH of DLD and PNA who presents with episodic tachycardia associated with  lightheadedness/presyncope, skin flushing, and diarrhea. This constellation of symptoms suggests an underlying endocrine pathology such as carcinoid syndrome or pheochromocytoma. Also considered is febrile illness (CAP), dehydration, anxiety, or drug-related. ECG shows regular, narrow-complex tachycardia with significant artifact - this is likely NSR (especially given HR variability on tele) but arrhythmia such as Aflutter or Atach are also possible.     # Pre-syncope  :: associated with tachycardia, flushing, and diarrhea which suggest possible underlying endocrine pathology such as carcinoid syndrome or pheochromocytoma  :: also considered is febrile illness (CAP), dehydration, anxiety, or drug-related   :: TSH, Hgb wnl   [ ] Orthostatic VS   [ ] urinary 5-HIAA results  [ ] urinary metanephrines  [ ] UDS    # Regular, narrow-complex tachycardia  :: Likely NSR  [ ] Serial ECG  [ ] Telemetry monitoring     # Diarrhea  - Possibly secondary to endocrinopathy as above or s/e of abx   [ ] CTM, consider further work up (Cdiff, enteric pathogen panel, etc.)     # CAP   [ ] Cont Augmentin to complete course   [ ] Dextromethorphan-guaifenesin PRN     # DLD  [ ] Rosuvastatin 5 mg at bedtime      F : PRN  E: PRN  N: Regular     DVT prophylaxis: Lovenox subq    Code Status: Full Code (confirmed on  admission)   NOK: Americo Thomson (spouse): [phone]       Saravanan Pal MD  Internal Medicine, PGY-3

## 2024-10-12 VITALS
HEART RATE: 99 BPM | DIASTOLIC BLOOD PRESSURE: 85 MMHG | RESPIRATION RATE: 16 BRPM | HEIGHT: 62 IN | TEMPERATURE: 97.3 F | WEIGHT: 152 LBS | BODY MASS INDEX: 27.97 KG/M2 | SYSTOLIC BLOOD PRESSURE: 132 MMHG | OXYGEN SATURATION: 99 %

## 2024-10-12 LAB
ALBUMIN SERPL BCP-MCNC: 3.7 G/DL (ref 3.4–5)
ALP SERPL-CCNC: 52 U/L (ref 33–110)
ALT SERPL W P-5'-P-CCNC: 38 U/L (ref 7–45)
ANION GAP SERPL CALC-SCNC: 12 MMOL/L (ref 10–20)
AST SERPL W P-5'-P-CCNC: 38 U/L (ref 9–39)
ATRIAL RATE: 110 BPM
BILIRUB DIRECT SERPL-MCNC: 0.1 MG/DL (ref 0–0.3)
BILIRUB SERPL-MCNC: 0.6 MG/DL (ref 0–1.2)
BUN SERPL-MCNC: 8 MG/DL (ref 6–23)
CALCIUM SERPL-MCNC: 9 MG/DL (ref 8.6–10.6)
CHLORIDE SERPL-SCNC: 106 MMOL/L (ref 98–107)
CO2 SERPL-SCNC: 25 MMOL/L (ref 21–32)
CREAT SERPL-MCNC: 0.6 MG/DL (ref 0.5–1.05)
EGFRCR SERPLBLD CKD-EPI 2021: >90 ML/MIN/1.73M*2
ERYTHROCYTE [DISTWIDTH] IN BLOOD BY AUTOMATED COUNT: 12.1 % (ref 11.5–14.5)
GLUCOSE SERPL-MCNC: 82 MG/DL (ref 74–99)
HCT VFR BLD AUTO: 38.8 % (ref 36–46)
HGB BLD-MCNC: 12.9 G/DL (ref 12–16)
MAGNESIUM SERPL-MCNC: 2.08 MG/DL (ref 1.6–2.4)
MCH RBC QN AUTO: 28.7 PG (ref 26–34)
MCHC RBC AUTO-ENTMCNC: 33.2 G/DL (ref 32–36)
MCV RBC AUTO: 86 FL (ref 80–100)
NRBC BLD-RTO: 0 /100 WBCS (ref 0–0)
P AXIS: 55 DEGREES
P OFFSET: 206 MS
P ONSET: 152 MS
PHOSPHATE SERPL-MCNC: 3.8 MG/DL (ref 2.5–4.9)
PLATELET # BLD AUTO: 206 X10*3/UL (ref 150–450)
POTASSIUM SERPL-SCNC: 4.1 MMOL/L (ref 3.5–5.3)
PR INTERVAL: 138 MS
PROT SERPL-MCNC: 6 G/DL (ref 6.4–8.2)
Q ONSET: 221 MS
QRS COUNT: 18 BEATS
QRS DURATION: 78 MS
QT INTERVAL: 338 MS
QTC CALCULATION(BAZETT): 457 MS
QTC FREDERICIA: 414 MS
R AXIS: 0 DEGREES
RBC # BLD AUTO: 4.5 X10*6/UL (ref 4–5.2)
SODIUM SERPL-SCNC: 139 MMOL/L (ref 136–145)
T AXIS: 53 DEGREES
T OFFSET: 390 MS
VENTRICULAR RATE: 110 BPM
WBC # BLD AUTO: 4.1 X10*3/UL (ref 4.4–11.3)

## 2024-10-12 PROCEDURE — 2500000001 HC RX 250 WO HCPCS SELF ADMINISTERED DRUGS (ALT 637 FOR MEDICARE OP)

## 2024-10-12 PROCEDURE — 83735 ASSAY OF MAGNESIUM: CPT

## 2024-10-12 PROCEDURE — 84100 ASSAY OF PHOSPHORUS: CPT

## 2024-10-12 PROCEDURE — 85027 COMPLETE CBC AUTOMATED: CPT

## 2024-10-12 PROCEDURE — 2500000002 HC RX 250 W HCPCS SELF ADMINISTERED DRUGS (ALT 637 FOR MEDICARE OP, ALT 636 FOR OP/ED)

## 2024-10-12 PROCEDURE — 36415 COLL VENOUS BLD VENIPUNCTURE: CPT

## 2024-10-12 PROCEDURE — 82435 ASSAY OF BLOOD CHLORIDE: CPT

## 2024-10-12 PROCEDURE — 84075 ASSAY ALKALINE PHOSPHATASE: CPT

## 2024-10-12 PROCEDURE — 99239 HOSP IP/OBS DSCHRG MGMT >30: CPT

## 2024-10-12 PROCEDURE — G0378 HOSPITAL OBSERVATION PER HR: HCPCS

## 2024-10-12 ASSESSMENT — PAIN - FUNCTIONAL ASSESSMENT: PAIN_FUNCTIONAL_ASSESSMENT: 0-10

## 2024-10-12 ASSESSMENT — PAIN SCALES - GENERAL: PAINLEVEL_OUTOF10: 0 - NO PAIN

## 2024-10-12 NOTE — PROGRESS NOTES
"Internal Medicine Progress Note    Admission Date: 10/10/2024    Note date: 10/12/2024    Subjective  PRN Atarax x2. Brief tachycardia to 110s overnight. Positive orthostats for HR this AM, but feels symptomatically improved. Tele with sinus tach.    Objective    /66 (BP Location: Right arm, Patient Position: Lying)   Pulse 84   Temp 36.5 °C (97.7 °F) (Temporal)   Resp 18   Ht 1.575 m (5' 2\")   Wt 68.9 kg (152 lb)   SpO2 98%   BMI 27.80 kg/m²       Intake/Output Summary (Last 24 hours) at 10/12/2024 0631  Last data filed at 10/12/2024 0000  Gross per 24 hour   Intake 360 ml   Output --   Net 360 ml       Physical Exam:    General: Alert, no acute distress, appears well developed/stated age.  Head: Normocephalic, atraumatic  Eyes: Nonicteric sclera, EOMI  Neck: Supple; trachea midline  Heart: RRR; no murmurs/rubs/gallops  Lungs: Clear to auscultation bilaterally, nonlabored breathing, on RA  Abdomen: Soft, nondistended. Normal bowel sounds. No tenderness to palpation. No guarding or rebound.  Extremities: No cyanosis or edema.  Pulses: 2+ and symmetric  Skin: No rashes or lesions on exposed skin. Flushed skin noticeable  Psych: Normal mood and affect; normal speech. Pleasant and cooperative  Neuro: Alert & oriented x3; No slurring noted. Moving extremities appropriately.    RECENT LABS:    Results for orders placed or performed during the hospital encounter of 10/10/24 (from the past 24 hour(s))   Transthoracic Echo (TTE) Limited   Result Value Ref Range    BSA 1.75 m2       IMAGING:    ECG 12 lead    Result Date: 10/10/2024  Atrial flutter with 2:1 AV conduction Possible Inferior infarct , age undetermined Anterior infarct , age undetermined Abnormal ECG When compared with ECG of 09-OCT-2024 09:48, Atrial flutter has replaced Sinus rhythm Anterior infarct is now Present See ED provider note for full interpretation and clinical correlation Confirmed by Fang Carmona ([id number]) on 10/10/2024 11:54:14 " PM    ECG 12 lead    Result Date: 10/10/2024  Normal sinus rhythm Normal ECG When compared with ECG of 10-OCT-2024 11:08, Sinus rhythm has replaced Atrial flutter Criteria for Anterior infarct are no longer Present Borderline criteria for Inferior infarct are no longer Present Non-specific change in ST segment in Inferior leads See ED provider note for full interpretation and clinical correlation Confirmed by Fang Carmona ([id number]) on 10/10/2024 11:53:00 PM    ECG 12 lead    Result Date: 10/10/2024  Normal sinus rhythm with sinus arrhythmia Cannot rule out Anterior infarct , age undetermined Abnormal ECG When compared with ECG of 10-OCT-2024 18:23, T wave inversion now evident in Inferior leads See ED provider note for full interpretation and clinical correlation Confirmed by Fang Carmona ([id number]) on 10/10/2024 11:52:15 PM    CT head wo IV contrast    Result Date: 10/10/2024  Interpreted By:  Mark Anthony Corona,  and Rogelio Sepulveda STUDY: CT HEAD WO IV CONTRAST;  10/10/2024 2:21 pm   INDICATION: Signs/Symptoms:confusion, dizziness.     COMPARISON: CT of the head dated January 25, 2017.   ACCESSION NUMBER(S): [id number]   ORDERING CLINICIAN: CHANDLER VALDIVIA   TECHNIQUE: Noncontrast axial CT scan of head was performed.   FINDINGS: Parenchyma: There is no intracranial hemorrhage. The grey-white differentiation is intact. There is no mass effect or midline shift.   CSF Spaces: The ventricles, sulci and basal cisterns are within normal limits for age.   Extra-Axial Fluid: There is no extraaxial fluid collection.   Calvarium: The calvarium is unremarkable.   Paranasal sinuses: Visualized paranasal sinuses are clear.   Mastoids: Clear.   Orbits: Normal.   Soft tissues: Unremarkable.       No acute intracranial hemorrhage, mass effect, or CT apparent acute infarct.   MACRO: None   Signed by: Mark Anthony Corona 10/10/2024 2:55 PM Dictation workstation:   [id number]       Scheduled medications  amoxicillin-pot clavulanate, 1 tablet,  oral, q12h  [START ON 10/14/2024] cholecalciferol, 50,000 Units, oral, Weekly  cyanocobalamin, 1,000 mcg, oral, Daily  enoxaparin, 40 mg, subcutaneous, q24h  polyethylene glycol, 17 g, oral, Daily  rosuvastatin, 5 mg, oral, Nightly      Continuous medications     PRN medications  PRN medications: dextromethorphan-guaifenesin, hydrOXYzine HCL    Assessment & Plan     This is a 39-year-old female with PMH of hyperlipidemia and is currently undergoing treatment for bacterial CAP who presents with lightheadedness and elevated HR. Her paroxysmal episodes are suggestive of an underlying endocrine pathology such as carcinoid syndrome (especially in the setting of her flushing and diarrhea) vs pheochromocytoma - will obtain urinary metanephrines and 5-HIAA. Differential includes cardiac arrhythmia with HR elevated to 140s-150s which may be suggestive although serial EKGs demonstrate NSR.    Updates 10/12:  - TTE to be reviewed by Cardiology fellow  - Planning for Holter on discharge   - Continue CAP treatment through 10/14 for 7 day course     If TTE stable and Holter placed, discharge to home.    Plan  #Lightheadedness  #Tachycardia  #Flushing  ::TSH 1.71  ::UDS negative  -follow urinary 5-HIAA results  -follow urinary metanephrines  -monitor telemetry  -hold tylenol (may be masking fevers)     #CAP  -c/w augmentin  -c/w mucinex     #HLD  -c/w crestor     #Miscellaneous  -c/w B12  -c/w atarax PRN  -hold Zepbound     Fluids: PRN  Electrolytes: PRN  Nutrition:  Regular Diet  DVT ppx: none  GI ppx: none  Bowel care:  Miralax 17g      Chyna Nicholson MD  Internal Medicine and Pediatrics, PGY-2  [place]

## 2024-10-12 NOTE — CARE PLAN
Problem: Pain - Adult  Goal: Verbalizes/displays adequate comfort level or baseline comfort level  Outcome: Progressing     Problem: Safety - Adult  Goal: Free from fall injury  Outcome: Progressing     Problem: Discharge Planning  Goal: Discharge to home or other facility with appropriate resources  Outcome: Progressing   The patient's goals for the shift include      The clinical goals for the shift include Patient will get adequate sleep throughout this shift

## 2024-10-12 NOTE — PROGRESS NOTES
10/12/24 0930   Discharge Planning   Living Arrangements Spouse/significant other   Support Systems Spouse/significant other   Type of Residence Private residence   Number of Stairs to Enter Residence 4   Number of Stairs Within Residence 0   Do you have animals or pets at home? Yes   Type of Animals or Pets 1Dog   Who is requesting discharge planning? Provider   Home or Post Acute Services None   Expected Discharge Disposition Home   Does the patient need discharge transport arranged? No     Met with patient to introduce myself, role and discuss discharge planning.  Patient lives with spouse.  Patient is independent in all adl's.  Requires no assist devices for mobility.  Patient denies any recent falls.  Patient denies any issues with making follow up appointments or getting her medications.  Patient expressed no questions and no social work concerns.  PCP:  Brianna Ruiz  Pharmacy:  [facility]  Home Care:  N/A  DME:  N/A

## 2024-10-12 NOTE — PROGRESS NOTES
10/12/24 1520   Discharge Planning   Living Arrangements Spouse/significant other   Support Systems Spouse/significant other   Who is requesting discharge planning? Provider   Home or Post Acute Services None   Expected Discharge Disposition Home   Does the patient need discharge transport arranged? No     Patient is medically ready for discharge.  Patient requires no additional home going needs.

## 2024-10-13 LAB
BACTERIA BLD CULT: NORMAL
BACTERIA BLD CULT: NORMAL

## 2024-10-14 ENCOUNTER — APPOINTMENT (OUTPATIENT)
Dept: CARDIOLOGY | Facility: CLINIC | Age: 39
End: 2024-10-14
Payer: COMMERCIAL

## 2024-10-14 ENCOUNTER — PATIENT OUTREACH (OUTPATIENT)
Dept: PRIMARY CARE | Facility: CLINIC | Age: 39
End: 2024-10-14
Payer: COMMERCIAL

## 2024-10-14 LAB
EJECTION FRACTION APICAL 4 CHAMBER: 55.5
EJECTION FRACTION: 63 %
LEFT ATRIUM VOLUME AREA LENGTH INDEX BSA: 10.9 ML/M2
LEFT VENTRICLE INTERNAL DIMENSION DIASTOLE: 4.1 CM (ref 3.5–6)
LEFT VENTRICULAR OUTFLOW TRACT DIAMETER: 1.9 CM
MITRAL VALVE E/A RATIO: 1.16
RIGHT VENTRICLE FREE WALL PEAK S': 12.7 CM/S
TRICUSPID ANNULAR PLANE SYSTOLIC EXCURSION: 2.5 CM

## 2024-10-14 NOTE — PROGRESS NOTES
Discharge Facility: [facility]  Discharge Diagnosis: Tachycardia  Admission Date: 10/10/2024  Discharge Date: 10/12/2024    PCP Appointment Date: 10/15/2024 1:40 PM  Specialist Appointment Date: Referral to Cardiology  Hospital Encounter and Summary Linked: Yes  See discharge assessment below for further details    Medications  Medications reviewed with patient/caregiver?: No (10/14/2024  9:57 AM)  Prescription Comments: No medication changes made. (10/14/2024  9:57 AM)    Appointments  Does the patient have a primary care provider?: Yes (10/14/2024  9:57 AM)  Care Management Interventions: Verified appointment date/time/provider (10/14/2024  9:57 AM)  Has the patient kept scheduled appointments due by today?: Not applicable (10/14/2024  9:57 AM)  Care Management Interventions: Advised to schedule with specialist (Cardiology) (10/14/2024  9:57 AM)    Self Management  What is the home health agency?: N/A (10/14/2024  9:57 AM)  What Durable Medical Equipment (DME) was ordered?: N/A (10/14/2024  9:57 AM)    Patient Teaching  Does the patient have access to their discharge instructions?: Yes (10/14/2024  9:57 AM)  Care Management Interventions: Reviewed instructions with patient (10/14/2024  9:57 AM)  What is the patient's perception of their health status since discharge?: Same (HR continues to be tachycardic at 110-118 since d/c to home. Denied SOB or CP.) (10/14/2024  9:57 AM)  Is the patient/caregiver able to teach back the hierarchy of who to call/visit for symptoms/problems? PCP, Specialist, Home Health nurse, Urgent Care, ED, 911: Yes (10/14/2024  9:57 AM)  Patient/Caregiver Education Comments: Advised patient to return to ED for SOB or CP. Patient verbalized understanding of instructions. (10/14/2024  9:57 AM)    Wrap Up  Wrap Up Additional Comments: 39yoF with PMHx of hyperlipidemia and anorexia nervosa who recently was found to have pneumonia and was discharged on antibiotics, but then  developed mild Tylenol toxicity from trying to treat her fever which was treated with N-acetylcysteine complicated by an anaphylactoid reaction. Patient was discharged 10/9 and presented on 10/10 with persistent dizziness and palpitations. After work up, there was concern for medulloblastoma and a 24-hour urine metanephrine test is in process. Patient discharged to home self care. No medication changes made. Cardiology referral made. (10/14/2024  9:57 AM)

## 2024-10-15 ENCOUNTER — APPOINTMENT (OUTPATIENT)
Dept: PRIMARY CARE | Facility: CLINIC | Age: 39
End: 2024-10-15
Payer: COMMERCIAL

## 2024-10-16 LAB
COLLECT DURATION TIME SPEC: 24 HR
CREAT 24H UR-MRATE: NORMAL MG/D (ref 700–1600)
CREAT UR-MCNC: 79 MG/DL
METANEPH 24H UR-MCNC: 44 UG/L
METANEPH 24H UR-MRATE: NORMAL UG/D (ref 36–229)
METANEPH+NORMETANEPH UR-IMP: NORMAL
METANEPH/CREAT 24H UR: 56 UG/G CRT (ref 0–300)
NORMETANEPHRINE 24H UR-MCNC: 72 UG/L
NORMETANEPHRINE 24H UR-MRATE: NORMAL UG/D (ref 95–650)
NORMETANEPHRINE/CREAT 24H UR: 91 UG/G CRT (ref 0–400)
SPECIMEN VOL ?TM UR: NORMAL ML

## 2024-10-16 NOTE — DISCHARGE SUMMARY
Discharge Diagnosis  Symptomatic Sinus Tachycardia  Left Lower Lobe Pneumonia    Issues Requiring Follow-Up    -Follow up Holter placement  -Cardiology follow up to evaluate Holter results    Discharge Meds     Medication List      CONTINUE taking these medications     cholecalciferol 50,000 unit capsule; Commonly known as: Vitamin D-3   cyanocobalamin 1,000 mcg tablet; Commonly known as: Vitamin B-12   dextromethorphan-guaifenesin  mg 12 hr tablet; Commonly known as:   Mucinex DM; Take 1 tablet by mouth 2 times a day as needed for cough. Do   not crush, chew, or split.   hydrOXYzine HCL 25 mg tablet; Commonly known as: Atarax   multivitamin tablet   rosuvastatin 5 mg tablet; Commonly known as: Crestor; Take 1 tablet (5   mg) by mouth once daily.   tirzepatide 15 mg/0.5 mL pen injector     STOP taking these medications     doxycycline 100 mg capsule; Commonly known as: Monodox     ASK your doctor about these medications     amoxicillin-pot clavulanate 875-125 mg tablet; Commonly known as:   Augmentin; Take 1 tablet by mouth every 12 hours for 7 days.; Ask about:   Should I take this medication?       Test Results Pending At Discharge  Pending Labs       Order Current Status    5 HIAA, urine, quantitative, 24 hour In process            Hospital Course     This is a 39-year-old female with PMH of hyperlipidemia and recently diagnosed CAP who presents with dizziness and tachycardia. She was recently admitted to [facility] on 10/7 for fever, chills, SOB, and cough and was found to have pneumonia for which she is being treated with augmentin and doxycycline (later switched to azithromycin). She returned on 10/8 for accidental tylenol overdose as she thought she was alternating tylenol and motrin but she actually was taking generic and brand name tylenol instead. She was started on acetylcysteine given her acetaminophen level of 23, however her treatment course was complicated by a presumed allergic reaction which began  1 hour after initiation of acetylcysteine. Her symptoms resolved with administration of diphenhydramine, methylprednisolone, and famotidine. LFTs/INR wnl.     She presented on 10/10 for dizziness and heart racing/elevated HR. She also reported diarrhea and noticed a change in the color of her skin such that she is more flushed but was recently in [place] the weekend prior to admission.     During her hospitalization, augmentin was continued for her pneumonia. Her telemetry was monitored and it demonstrated sinus tachycardia without overt arrhythmia. 5HIAA and metanephrines had been ordered although clinical suspicion was low. She underwent a TTE prior to discharge which demonstrated normal biventricular function. Holter was ordered but was not placed prior to discharge as it was not available on Saturday, will be discharged with instructions for follow up for Holter and cardiology.      Pertinent Physical Exam At Time of Discharge  Physical Exam    General: Alert, no acute distress, appears well developed/stated age.  Head: Normocephalic, atraumatic  Eyes: Nonicteric sclera, EOMI  Neck: Supple; trachea midline  Heart: RRR; no murmurs/rubs/gallops  Lungs: Clear to auscultation bilaterally, nonlabored breathing, on RA  Abdomen: Soft, nondistended. Normal bowel sounds. No tenderness to palpation. No guarding or rebound.  Extremities: No cyanosis or edema.  Pulses: 2+ and symmetric  Skin: No rashes or lesions on exposed skin. Flushed skin noticeable  Psych: Normal mood and affect; normal speech. Pleasant and cooperative  Neuro: Alert & oriented x3; No slurring noted. Moving extremities appropriately.    Outpatient Follow-Up  Future Appointments   Date Time Provider Department [place]   10/18/2024  8:20 AM JHONATAN Coello-CNP [id number] [facility]   1/2/2025  9:00 AM JHONATAN Mcdermott-CNP [id number] [facility]         Philip Jalloh MD    ATTENDING:  See discharge day progress note.

## 2024-10-17 DIAGNOSIS — R06.02 SOB (SHORTNESS OF BREATH): Primary | ICD-10-CM

## 2024-10-17 DIAGNOSIS — R00.2 HEART PALPITATIONS: ICD-10-CM

## 2024-10-17 DIAGNOSIS — R00.0 TACHYCARDIA: ICD-10-CM

## 2024-10-18 ENCOUNTER — OFFICE VISIT (OUTPATIENT)
Dept: PRIMARY CARE | Facility: CLINIC | Age: 39
End: 2024-10-18
Payer: COMMERCIAL

## 2024-10-18 VITALS
WEIGHT: 153 LBS | BODY MASS INDEX: 28.16 KG/M2 | SYSTOLIC BLOOD PRESSURE: 111 MMHG | DIASTOLIC BLOOD PRESSURE: 73 MMHG | OXYGEN SATURATION: 98 % | HEART RATE: 78 BPM | HEIGHT: 62 IN

## 2024-10-18 DIAGNOSIS — J18.9 PNEUMONIA DUE TO INFECTIOUS ORGANISM, UNSPECIFIED LATERALITY, UNSPECIFIED PART OF LUNG: Primary | ICD-10-CM

## 2024-10-18 DIAGNOSIS — R05.1 ACUTE COUGH: ICD-10-CM

## 2024-10-18 PROCEDURE — 3008F BODY MASS INDEX DOCD: CPT

## 2024-10-18 PROCEDURE — 99213 OFFICE O/P EST LOW 20 MIN: CPT

## 2024-10-18 RX ORDER — ALBUTEROL SULFATE 90 UG/1
2 INHALANT RESPIRATORY (INHALATION) EVERY 4 HOURS PRN
Qty: 8.5 G | Refills: 5 | Status: SHIPPED | OUTPATIENT
Start: 2024-10-18 | End: 2025-10-18

## 2024-10-18 NOTE — PATIENT INSTRUCTIONS
-Use albuterol inhaler as needed.  -Monitor heart rate  -Chest xray in 2 weeks  Restart Zepbound

## 2024-10-18 NOTE — PROGRESS NOTES
"Subjective   Patient ID: Karen Thomson is a 39 y.o. female who presents for Follow-up (Patient is here today for a follow up from her ER visit due to pneumonia. Patient states she still has a cough. Patient wanted to discuss an inhaler. Patient is still having trouble with her heart racing. ).    HPI   Pt in today for follow up. She has been in and out of the hospital the past few weeks with pneumonia. They originally sent her due to high heart rate. Still has cough. Finished Augmentin Monday. Does overall feel better she would like a inhaler and a follow up xray. She does get winded when walking.  She is going to have a hear monitor placed to monitor the racing heart. When she is resting her heart is fine she notices it upon standing it shoots up. She also took a week off on her zepbound and would like to start that back again.     Review of Systems    Objective   /73   Pulse 78   Ht 1.575 m (5' 2\")   Wt 69.4 kg (153 lb)   SpO2 98%   BMI 27.98 kg/m²     Physical Exam  Vitals reviewed.   Constitutional:       Appearance: Normal appearance.   Cardiovascular:      Rate and Rhythm: Normal rate and regular rhythm.      Pulses: Normal pulses.      Heart sounds: Normal heart sounds.   Pulmonary:      Effort: Pulmonary effort is normal.      Breath sounds: Normal breath sounds.   Neurological:      General: No focal deficit present.      Mental Status: She is alert and oriented to person, place, and time.   Psychiatric:         Mood and Affect: Mood normal.         Behavior: Behavior normal.         Assessment/Plan   Diagnoses and all orders for this visit:  Pneumonia due to infectious organism, unspecified laterality, unspecified part of lung  -     XR chest 2 views; Future  Acute cough  -     albuterol (ProAir HFA) 90 mcg/actuation inhaler; Inhale 2 puffs every 4 hours if needed for wheezing or shortness of breath.       "

## 2024-10-21 LAB
5OH-INDOLEACETATE 24H UR-MCNC: 1.7 MG/L
5OH-INDOLEACETATE 24H UR-MRATE: NORMAL MG/24 HR (ref 0–14.9)

## 2024-10-22 ENCOUNTER — PATIENT OUTREACH (OUTPATIENT)
Dept: PRIMARY CARE | Facility: CLINIC | Age: 39
End: 2024-10-22
Payer: COMMERCIAL

## 2024-10-22 NOTE — PROGRESS NOTES
Call regarding appt with Adelina Abarca CNP on 10/18/2024 after hospitalization. At time of outreach call, the patient feels as her condition is improving. No questions regarding appt. Patient is in process of scheduling heart monitor.

## 2024-10-23 ENCOUNTER — TELEPHONE (OUTPATIENT)
Dept: INTERNAL MEDICINE | Facility: HOSPITAL | Age: 39
End: 2024-10-23
Payer: COMMERCIAL

## 2024-10-23 ENCOUNTER — PATIENT MESSAGE (OUTPATIENT)
Dept: PRIMARY CARE | Facility: CLINIC | Age: 39
End: 2024-10-23
Payer: COMMERCIAL

## 2024-10-23 DIAGNOSIS — E66.09 CLASS 1 OBESITY DUE TO EXCESS CALORIES WITH BODY MASS INDEX (BMI) OF 33.0 TO 33.9 IN ADULT, UNSPECIFIED WHETHER SERIOUS COMORBIDITY PRESENT: ICD-10-CM

## 2024-10-23 DIAGNOSIS — E66.9 OBESITY (BMI 30-39.9): Primary | ICD-10-CM

## 2024-10-23 DIAGNOSIS — E66.811 CLASS 1 OBESITY DUE TO EXCESS CALORIES WITH BODY MASS INDEX (BMI) OF 33.0 TO 33.9 IN ADULT, UNSPECIFIED WHETHER SERIOUS COMORBIDITY PRESENT: ICD-10-CM

## 2024-10-23 DIAGNOSIS — Z71.3 WEIGHT LOSS COUNSELING, ENCOUNTER FOR: ICD-10-CM

## 2024-11-11 ENCOUNTER — TELEPHONE (OUTPATIENT)
Dept: PRIMARY CARE | Facility: CLINIC | Age: 39
End: 2024-11-11
Payer: COMMERCIAL

## 2024-11-11 ENCOUNTER — HOSPITAL ENCOUNTER (OUTPATIENT)
Dept: RADIOLOGY | Facility: HOSPITAL | Age: 39
Discharge: HOME | End: 2024-11-11
Payer: COMMERCIAL

## 2024-11-11 DIAGNOSIS — J18.9 PNEUMONIA DUE TO INFECTIOUS ORGANISM, UNSPECIFIED LATERALITY, UNSPECIFIED PART OF LUNG: ICD-10-CM

## 2024-11-11 PROCEDURE — 71046 X-RAY EXAM CHEST 2 VIEWS: CPT | Performed by: RADIOLOGY

## 2024-11-11 PROCEDURE — 71046 X-RAY EXAM CHEST 2 VIEWS: CPT

## 2024-11-13 ENCOUNTER — PATIENT OUTREACH (OUTPATIENT)
Dept: PRIMARY CARE | Facility: CLINIC | Age: 39
End: 2024-11-13
Payer: COMMERCIAL

## 2024-11-13 DIAGNOSIS — J18.9 PNEUMONIA OF LEFT LOWER LOBE DUE TO INFECTIOUS ORGANISM: Primary | ICD-10-CM

## 2024-11-13 RX ORDER — ERYTHROMYCIN 500 MG/1
500 TABLET, DELAYED RELEASE ORAL 2 TIMES DAILY
Qty: 20 TABLET | Refills: 0 | Status: SHIPPED | OUTPATIENT
Start: 2024-11-13 | End: 2024-11-23

## 2024-11-15 ENCOUNTER — HOSPITAL ENCOUNTER (OUTPATIENT)
Dept: CARDIOLOGY | Facility: HOSPITAL | Age: 39
Discharge: HOME | End: 2024-11-15
Payer: COMMERCIAL

## 2024-11-15 DIAGNOSIS — R00.0 TACHYCARDIA: ICD-10-CM

## 2024-11-15 PROCEDURE — 93270 REMOTE 30 DAY ECG REV/REPORT: CPT

## 2024-11-15 PROCEDURE — 93272 ECG/REVIEW INTERPRET ONLY: CPT | Performed by: INTERNAL MEDICINE

## 2024-12-03 ENCOUNTER — HOSPITAL ENCOUNTER (EMERGENCY)
Facility: HOSPITAL | Age: 39
Discharge: HOME | End: 2024-12-03
Payer: COMMERCIAL

## 2024-12-03 ENCOUNTER — OFFICE VISIT (OUTPATIENT)
Dept: PRIMARY CARE | Facility: CLINIC | Age: 39
End: 2024-12-03
Payer: COMMERCIAL

## 2024-12-03 ENCOUNTER — APPOINTMENT (OUTPATIENT)
Dept: RADIOLOGY | Facility: HOSPITAL | Age: 39
End: 2024-12-03
Payer: COMMERCIAL

## 2024-12-03 ENCOUNTER — APPOINTMENT (OUTPATIENT)
Dept: CARDIOLOGY | Facility: HOSPITAL | Age: 39
End: 2024-12-03
Payer: COMMERCIAL

## 2024-12-03 ENCOUNTER — LAB (OUTPATIENT)
Dept: LAB | Facility: LAB | Age: 39
End: 2024-12-03
Payer: COMMERCIAL

## 2024-12-03 VITALS
RESPIRATION RATE: 14 BRPM | SYSTOLIC BLOOD PRESSURE: 117 MMHG | BODY MASS INDEX: 25.69 KG/M2 | WEIGHT: 145 LBS | TEMPERATURE: 98.6 F | OXYGEN SATURATION: 100 % | DIASTOLIC BLOOD PRESSURE: 81 MMHG | HEART RATE: 123 BPM | HEIGHT: 63 IN

## 2024-12-03 VITALS
BODY MASS INDEX: 26.4 KG/M2 | DIASTOLIC BLOOD PRESSURE: 83 MMHG | OXYGEN SATURATION: 98 % | WEIGHT: 149 LBS | HEART RATE: 122 BPM | HEIGHT: 63 IN | SYSTOLIC BLOOD PRESSURE: 122 MMHG

## 2024-12-03 DIAGNOSIS — R06.02 SHORTNESS OF BREATH: ICD-10-CM

## 2024-12-03 DIAGNOSIS — R00.0 TACHYCARDIA: Primary | ICD-10-CM

## 2024-12-03 DIAGNOSIS — J18.9 PNEUMONIA DUE TO INFECTIOUS ORGANISM, UNSPECIFIED LATERALITY, UNSPECIFIED PART OF LUNG: Primary | ICD-10-CM

## 2024-12-03 DIAGNOSIS — J18.9 PNEUMONIA DUE TO INFECTIOUS ORGANISM, UNSPECIFIED LATERALITY, UNSPECIFIED PART OF LUNG: ICD-10-CM

## 2024-12-03 LAB
ALBUMIN SERPL BCP-MCNC: 4.3 G/DL (ref 3.4–5)
ALBUMIN SERPL BCP-MCNC: 4.7 G/DL (ref 3.4–5)
ALP SERPL-CCNC: 48 U/L (ref 33–110)
ALP SERPL-CCNC: 53 U/L (ref 33–110)
ALT SERPL W P-5'-P-CCNC: 20 U/L (ref 7–45)
ALT SERPL W P-5'-P-CCNC: 20 U/L (ref 7–45)
ANION GAP SERPL CALC-SCNC: 15 MMOL/L (ref 10–20)
ANION GAP SERPL CALC-SCNC: 18 MMOL/L (ref 10–20)
AST SERPL W P-5'-P-CCNC: 24 U/L (ref 9–39)
AST SERPL W P-5'-P-CCNC: 25 U/L (ref 9–39)
B-HCG SERPL-ACNC: <2 MIU/ML
BASOPHILS # BLD AUTO: 0.03 X10*3/UL (ref 0–0.1)
BASOPHILS # BLD AUTO: 0.04 X10*3/UL (ref 0–0.1)
BASOPHILS NFR BLD AUTO: 0.5 %
BASOPHILS NFR BLD AUTO: 0.5 %
BILIRUB SERPL-MCNC: 0.8 MG/DL (ref 0–1.2)
BILIRUB SERPL-MCNC: 0.9 MG/DL (ref 0–1.2)
BUN SERPL-MCNC: 15 MG/DL (ref 6–23)
BUN SERPL-MCNC: 15 MG/DL (ref 6–23)
CALCIUM SERPL-MCNC: 9 MG/DL (ref 8.6–10.3)
CALCIUM SERPL-MCNC: 9.5 MG/DL (ref 8.6–10.3)
CARDIAC TROPONIN I PNL SERPL HS: 3 NG/L (ref 0–13)
CARDIAC TROPONIN I PNL SERPL HS: <3 NG/L (ref 0–13)
CHLORIDE SERPL-SCNC: 104 MMOL/L (ref 98–107)
CHLORIDE SERPL-SCNC: 106 MMOL/L (ref 98–107)
CO2 SERPL-SCNC: 18 MMOL/L (ref 21–32)
CO2 SERPL-SCNC: 22 MMOL/L (ref 21–32)
CREAT SERPL-MCNC: 0.68 MG/DL (ref 0.5–1.05)
CREAT SERPL-MCNC: 0.71 MG/DL (ref 0.5–1.05)
EGFRCR SERPLBLD CKD-EPI 2021: >90 ML/MIN/1.73M*2
EGFRCR SERPLBLD CKD-EPI 2021: >90 ML/MIN/1.73M*2
EOSINOPHIL # BLD AUTO: 0.05 X10*3/UL (ref 0–0.7)
EOSINOPHIL # BLD AUTO: 0.1 X10*3/UL (ref 0–0.7)
EOSINOPHIL NFR BLD AUTO: 0.6 %
EOSINOPHIL NFR BLD AUTO: 1.8 %
ERYTHROCYTE [DISTWIDTH] IN BLOOD BY AUTOMATED COUNT: 12.5 % (ref 11.5–14.5)
ERYTHROCYTE [DISTWIDTH] IN BLOOD BY AUTOMATED COUNT: 12.6 % (ref 11.5–14.5)
GLUCOSE SERPL-MCNC: 80 MG/DL (ref 74–99)
GLUCOSE SERPL-MCNC: 95 MG/DL (ref 74–99)
HCT VFR BLD AUTO: 43.7 % (ref 36–46)
HCT VFR BLD AUTO: 44.4 % (ref 36–46)
HETEROPH AB SERPLBLD QL IA.RAPID: NEGATIVE
HGB BLD-MCNC: 14.2 G/DL (ref 12–16)
HGB BLD-MCNC: 14.9 G/DL (ref 12–16)
IMM GRANULOCYTES # BLD AUTO: 0.01 X10*3/UL (ref 0–0.7)
IMM GRANULOCYTES # BLD AUTO: 0.02 X10*3/UL (ref 0–0.7)
IMM GRANULOCYTES NFR BLD AUTO: 0.2 % (ref 0–0.9)
IMM GRANULOCYTES NFR BLD AUTO: 0.2 % (ref 0–0.9)
LACTATE SERPL-SCNC: 2.1 MMOL/L (ref 0.4–2)
LYMPHOCYTES # BLD AUTO: 2.04 X10*3/UL (ref 1.2–4.8)
LYMPHOCYTES # BLD AUTO: 2.34 X10*3/UL (ref 1.2–4.8)
LYMPHOCYTES NFR BLD AUTO: 24.2 %
LYMPHOCYTES NFR BLD AUTO: 41.2 %
MAGNESIUM SERPL-MCNC: 1.96 MG/DL (ref 1.6–2.4)
MCH RBC QN AUTO: 28.5 PG (ref 26–34)
MCH RBC QN AUTO: 29.4 PG (ref 26–34)
MCHC RBC AUTO-ENTMCNC: 32 G/DL (ref 32–36)
MCHC RBC AUTO-ENTMCNC: 34.1 G/DL (ref 32–36)
MCV RBC AUTO: 86 FL (ref 80–100)
MCV RBC AUTO: 89 FL (ref 80–100)
MONOCYTES # BLD AUTO: 0.47 X10*3/UL (ref 0.1–1)
MONOCYTES # BLD AUTO: 0.49 X10*3/UL (ref 0.1–1)
MONOCYTES NFR BLD AUTO: 5.8 %
MONOCYTES NFR BLD AUTO: 8.3 %
NEUTROPHILS # BLD AUTO: 2.73 X10*3/UL (ref 1.2–7.7)
NEUTROPHILS # BLD AUTO: 5.78 X10*3/UL (ref 1.2–7.7)
NEUTROPHILS NFR BLD AUTO: 48 %
NEUTROPHILS NFR BLD AUTO: 68.7 %
NRBC BLD-RTO: 0 /100 WBCS (ref 0–0)
NRBC BLD-RTO: 0 /100 WBCS (ref 0–0)
PLATELET # BLD AUTO: 185 X10*3/UL (ref 150–450)
PLATELET # BLD AUTO: 201 X10*3/UL (ref 150–450)
POTASSIUM SERPL-SCNC: 3.3 MMOL/L (ref 3.5–5.3)
POTASSIUM SERPL-SCNC: 4 MMOL/L (ref 3.5–5.3)
PROT SERPL-MCNC: 6.9 G/DL (ref 6.4–8.2)
PROT SERPL-MCNC: 7.3 G/DL (ref 6.4–8.2)
RBC # BLD AUTO: 4.99 X10*6/UL (ref 4–5.2)
RBC # BLD AUTO: 5.07 X10*6/UL (ref 4–5.2)
SARS-COV-2 RNA RESP QL NAA+PROBE: NOT DETECTED
SODIUM SERPL-SCNC: 137 MMOL/L (ref 136–145)
SODIUM SERPL-SCNC: 139 MMOL/L (ref 136–145)
TSH SERPL-ACNC: 0.88 MIU/L (ref 0.44–3.98)
WBC # BLD AUTO: 5.7 X10*3/UL (ref 4.4–11.3)
WBC # BLD AUTO: 8.4 X10*3/UL (ref 4.4–11.3)

## 2024-12-03 PROCEDURE — 84443 ASSAY THYROID STIM HORMONE: CPT | Performed by: EMERGENCY MEDICINE

## 2024-12-03 PROCEDURE — 86308 HETEROPHILE ANTIBODY SCREEN: CPT

## 2024-12-03 PROCEDURE — 71275 CT ANGIOGRAPHY CHEST: CPT | Performed by: RADIOLOGY

## 2024-12-03 PROCEDURE — 3008F BODY MASS INDEX DOCD: CPT

## 2024-12-03 PROCEDURE — 2500000002 HC RX 250 W HCPCS SELF ADMINISTERED DRUGS (ALT 637 FOR MEDICARE OP, ALT 636 FOR OP/ED): Performed by: PHYSICIAN ASSISTANT

## 2024-12-03 PROCEDURE — 85025 COMPLETE CBC W/AUTO DIFF WBC: CPT

## 2024-12-03 PROCEDURE — 80053 COMPREHEN METABOLIC PANEL: CPT

## 2024-12-03 PROCEDURE — 2550000001 HC RX 255 CONTRASTS: Performed by: PHYSICIAN ASSISTANT

## 2024-12-03 PROCEDURE — 83735 ASSAY OF MAGNESIUM: CPT | Performed by: PHYSICIAN ASSISTANT

## 2024-12-03 PROCEDURE — 84484 ASSAY OF TROPONIN QUANT: CPT | Performed by: PHYSICIAN ASSISTANT

## 2024-12-03 PROCEDURE — 99285 EMERGENCY DEPT VISIT HI MDM: CPT | Mod: 25

## 2024-12-03 PROCEDURE — 84702 CHORIONIC GONADOTROPIN TEST: CPT | Performed by: PHYSICIAN ASSISTANT

## 2024-12-03 PROCEDURE — 80053 COMPREHEN METABOLIC PANEL: CPT | Performed by: PHYSICIAN ASSISTANT

## 2024-12-03 PROCEDURE — 99284 EMERGENCY DEPT VISIT MOD MDM: CPT | Mod: 25

## 2024-12-03 PROCEDURE — 87635 SARS-COV-2 COVID-19 AMP PRB: CPT | Performed by: PHYSICIAN ASSISTANT

## 2024-12-03 PROCEDURE — 71275 CT ANGIOGRAPHY CHEST: CPT

## 2024-12-03 PROCEDURE — 93005 ELECTROCARDIOGRAM TRACING: CPT

## 2024-12-03 PROCEDURE — 2500000001 HC RX 250 WO HCPCS SELF ADMINISTERED DRUGS (ALT 637 FOR MEDICARE OP): Performed by: PHYSICIAN ASSISTANT

## 2024-12-03 PROCEDURE — 83605 ASSAY OF LACTIC ACID: CPT | Performed by: PHYSICIAN ASSISTANT

## 2024-12-03 PROCEDURE — 36415 COLL VENOUS BLD VENIPUNCTURE: CPT | Performed by: PHYSICIAN ASSISTANT

## 2024-12-03 PROCEDURE — 1036F TOBACCO NON-USER: CPT

## 2024-12-03 PROCEDURE — 85025 COMPLETE CBC W/AUTO DIFF WBC: CPT | Performed by: PHYSICIAN ASSISTANT

## 2024-12-03 PROCEDURE — 99213 OFFICE O/P EST LOW 20 MIN: CPT

## 2024-12-03 RX ORDER — WATER
500 LIQUID (ML) MISCELLANEOUS ONCE
Status: COMPLETED | OUTPATIENT
Start: 2024-12-03 | End: 2024-12-03

## 2024-12-03 RX ORDER — LEVOFLOXACIN 500 MG/1
500 TABLET, FILM COATED ORAL DAILY
Qty: 10 TABLET | Refills: 0 | Status: SHIPPED | OUTPATIENT
Start: 2024-12-03 | End: 2024-12-13

## 2024-12-03 RX ORDER — POTASSIUM CHLORIDE 20 MEQ/1
40 TABLET, EXTENDED RELEASE ORAL ONCE
Status: COMPLETED | OUTPATIENT
Start: 2024-12-03 | End: 2024-12-03

## 2024-12-03 RX ORDER — LORAZEPAM 1 MG/1
1 TABLET ORAL ONCE
Status: COMPLETED | OUTPATIENT
Start: 2024-12-03 | End: 2024-12-03

## 2024-12-03 ASSESSMENT — PAIN SCALES - GENERAL
PAINLEVEL_OUTOF10: 0 - NO PAIN

## 2024-12-03 ASSESSMENT — PAIN - FUNCTIONAL ASSESSMENT: PAIN_FUNCTIONAL_ASSESSMENT: 0-10

## 2024-12-03 ASSESSMENT — COLUMBIA-SUICIDE SEVERITY RATING SCALE - C-SSRS
6. HAVE YOU EVER DONE ANYTHING, STARTED TO DO ANYTHING, OR PREPARED TO DO ANYTHING TO END YOUR LIFE?: NO
2. HAVE YOU ACTUALLY HAD ANY THOUGHTS OF KILLING YOURSELF?: NO
1. IN THE PAST MONTH, HAVE YOU WISHED YOU WERE DEAD OR WISHED YOU COULD GO TO SLEEP AND NOT WAKE UP?: NO

## 2024-12-03 NOTE — ED PROVIDER NOTES
HPI   Chief Complaint   Patient presents with    Shortness of Breath       Is a 39-year-old female who was sent in by her PCP due to elevated heart rate.  Patient states that she feels similar to pneumonia that she has had before in the past.  Patient has had tachycardia associated with her pneumonia previously.  She is seeing cardiology including having a Holter monitor and even the hospitalization.  She started Levaquin today and took her first dose at 3:00.  She was sent here for further evaluation.  Patient denies any recent fevers but does complain of some chills.  She denies any vomiting or diarrhea.  She is eating drinking as per usual.  She does complain of shortness of breath as well as feelings of palpitation but denies discomfort to the chest.      History provided by:  Patient          Patient History   Past Medical History:   Diagnosis Date    Acute pharyngitis, unspecified 08/23/2019    Pharyngitis with viral syndrome    Acute upper respiratory infection, unspecified 10/23/2016    Acute URI    Adjustment disorder with depressed mood 11/25/2019    Adjustment disorder with depressed mood    Anemia complicating pregnancy, unspecified trimester ([facility]) 03/16/2016    Anemia in pregnancy    Anxiety disorder, unspecified 11/25/2019    Acute anxiety    Body mass index (BMI) 39.0-39.9, adult 04/26/2021    Body mass index (BMI) of 39.0 to 39.9 in adult    Change in bowel habit 01/13/2015    Change in bowel habits    Chronic sinusitis, unspecified 05/11/2017    Recurrent sinusitis    Class 1 obesity with body mass index (BMI) of 33.0 to 33.9 in adult 11/08/2023    Diarrhea, unspecified 11/02/2021    Acute diarrhea    Disease of upper respiratory tract, unspecified 11/02/2021    Upper respiratory disease    Dry eye syndrome of bilateral lacrimal glands 01/16/2017    Bilateral dry eyes    Encounter for routine postpartum follow-up 03/16/2016    Routine postpartum follow-up    Encounter for screening for human  immunodeficiency virus (HIV) 2015    Encounter for screening for HIV    Encounter for screening for other infectious and parasitic diseases 2015    Special screening examination for other specified chlamydial diseases    Encounter for screening for other viral diseases 2015    Encounter for screening for other viral diseases    Fever, unspecified 2021    Fever and chills    Headache, unspecified 2017    Severe headache    History of  section 2020    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    Decreased milk production    Localized enlarged lymph nodes 10/20/2016    Anterior cervical adenopathy    Low vitamin D level 2023    Metrorrhagia 2023    Obesity, unspecified 10/25/2021    Class 2 obesity with body mass index (BMI) of 37.0 to 37.9 in adult    Obesity, unspecified 2021    Class 2 obesity with body mass index (BMI) of 36.0 to 36.9 in adult    Ocular pain, left eye 2017    Acute left eye pain    Ocular pain, left eye 2017    Pain of left eye    Other abnormal and inconclusive findings on diagnostic imaging of breast 2017    Abnormal ultrasound of breast    Other conditions influencing health status 2017    History of cough    Other specified health status     No pertinent past medical history    Other specified noninflammatory disorders of vagina 08/15/2018    Vaginal cyst    Other specified noninflammatory disorders of vagina     Vaginal odor    Palpitations 2015    Palpitations    Personal history of diseases of the skin and subcutaneous tissue 2017    History of skin pruritus    Personal history of diseases of the skin and subcutaneous tissue 2018    History of cyst of breast    Personal history of other complications of pregnancy, childbirth and the puerperium 2020    History of postpartum depression    Personal history of other diseases of the female genital tract 2015    History of female infertility     Personal history of other diseases of the female genital tract 04/05/2018    History of breast pain    Personal history of other diseases of the nervous system and sense organs 11/08/2021    History of conjunctivitis    Personal history of other diseases of the respiratory system 08/23/2019    History of sore throat    Personal history of other diseases of the respiratory system 03/02/2020    History of acute pharyngitis    Personal history of other diseases of the respiratory system 05/14/2017    History of sinusitis    Personal history of other diseases of the respiratory system 03/20/2019    History of influenza    Personal history of other diseases of urinary system 04/26/2021    H/O acute pyelonephritis    Personal history of other endocrine, nutritional and metabolic disease 06/02/2021    History of obesity    Personal history of other infectious and parasitic diseases 08/23/2019    History of viral gastroenteritis    Personal history of other specified conditions 11/22/2017    History of diarrhea    Personal history of other specified conditions 12/02/2021    History of fatigue    Personal history of other specified conditions 01/13/2015    History of diarrhea    Personal history of other specified conditions 01/13/2015    History of abdominal pain    Personal history of other specified conditions 10/01/2015    History of urinary frequency    Personal history of pneumonia (recurrent) 12/02/2021    History of recurrent pneumonia    Postpartum depression 06/28/2016    Post partum depression    Postpartum depression 04/04/2016    Postpartum depression    Procreative counseling and advice using natural family planning 12/04/2014    Procreative counseling and advice using natural family planning    Rash and other nonspecific skin eruption 11/24/2017    Rash    Unspecified acute conjunctivitis, bilateral 03/02/2020    Acute bacterial conjunctivitis of both eyes     Past Surgical History:   Procedure Laterality  Date    OTHER SURGICAL HISTORY  2018    Laparoscopic Aspiration Uterine Mass    OTHER SURGICAL HISTORY  2020     section    TONSILLECTOMY  2015    Tonsillectomy     Family History   Problem Relation Name Age of Onset    Other (cardiac disorder) Mother      Other (cardiac disorder) Father      Other (cardiac disorder) Maternal Grandmother      Diabetes Maternal Grandmother      Cancer Maternal Grandmother      Other (cardiac disorder) Paternal Grandmother      Diabetes Paternal Grandmother      Cancer Paternal Grandmother       Social History     Tobacco Use    Smoking status: Never     Passive exposure: Never    Smokeless tobacco: Never   Vaping Use    Vaping status: Never Used   Substance Use Topics    Alcohol use: Yes     Comment: socially    Drug use: Never       Physical Exam   ED Triage Vitals [24 1651]   Temperature Heart Rate Respirations BP   37.3 °C (99.1 °F) (S) (!) 166 18 124/75      Pulse Ox Temp Source Heart Rate Source Patient Position   100 % Temporal Monitor Sitting      BP Location FiO2 (%)     Left arm --       Physical Exam  Vitals and nursing note reviewed.   Constitutional:       General: She is not in acute distress.     Appearance: Normal appearance. She is not toxic-appearing.   HENT:      Head: Normocephalic and atraumatic.      Nose: Nose normal.      Mouth/Throat:      Mouth: Mucous membranes are moist.      Pharynx: Oropharynx is clear.   Eyes:      Extraocular Movements: Extraocular movements intact.      Conjunctiva/sclera: Conjunctivae normal.      Pupils: Pupils are equal, round, and reactive to light.   Cardiovascular:      Rate and Rhythm: Regular rhythm. Tachycardia present.      Pulses: Normal pulses.      Heart sounds: Normal heart sounds.   Pulmonary:      Effort: Pulmonary effort is normal. No tachypnea or respiratory distress.      Breath sounds: Normal breath sounds. No wheezing or rales.   Abdominal:      General: Abdomen is flat. Bowel sounds  are normal.      Palpations: Abdomen is soft.      Tenderness: There is no abdominal tenderness.   Musculoskeletal:         General: Normal range of motion.      Cervical back: Normal range of motion and neck supple.   Skin:     General: Skin is warm and dry.      Coloration: Skin is not jaundiced or pale.      Findings: No bruising.   Neurological:      General: No focal deficit present.      Mental Status: She is alert and oriented to person, place, and time. Mental status is at baseline.   Psychiatric:         Mood and Affect: Mood normal.         Behavior: Behavior normal.           ED Course & MDM   ED Course as of 12/03/24 1832   Tue Dec 03, 2024   1712 EKG completed at 1659 shows on my interpretation sinus tachycardia with a ventricular rate of 150 bpm.  No signs of STEMI.  Normal axis. [RS]      ED Course User Index  [RS] Mark Anthony Cook PA-C         Diagnoses as of 12/03/24 1832   Shortness of breath   Tachycardia                 No data recorded     Oscar Coma Scale Score: 15 (12/03/24 1656 : Jon Mcwilliams RN)                           Medical Decision Making  Summary:  Medical Decision Making:   Patient presented as described in HPI. Patient case including ROS, PE, and treatment and plan discussed with ED attending if attached as cosigner. Results from labs and or imaging included below if completed. Karen Thomson  is a 39 y.o. coming in for Patient presents with:  Shortness of Breath  .  Patient did come in severely tachycardic with rate of 150.  Cardiac workup is initiated as well as PE scan.  Lab work showed normal white blood cell count.  Slightly low potassium at potassium of 3.3.  This was replenished orally.  She does not have any other concerning lab abnormalities.  She was given 1 mg of Ativan orally and her heart rate greatly improved.  Heart rate is just above 100.  She has had continued tachycardia for some time now and has had outpatient Holter monitor and cardiology follow-up.   Patient is advised about the findings including the CT scan did not show any signs of pulmonary embolism as well as resolution of previous pneumonia.  Patient will follow-up with her PCP in regards to further medical management that includes potentially stopping her antibiotics and asking about further medications to help her symptoms.  She will be discharged at this time with PCP and cardiology follow-up and return with any worsening or change of symptoms.      Disposition is completed with shared decision making with the patient or guardian present with the patient. They were advised to follow up with PCP or recommended provider in 2-3 days for another evaluation and exam. I advised the patient to return or go to closest emergency room immediately if symptoms change, get worse, or new symptoms develop prior to follow up. I explained the plan and treatment course. Patient/guardian is in agreement with plan, treatment course, and follow up and state that they will comply.    Labs Reviewed  COMPREHENSIVE METABOLIC PANEL - Abnormal     Glucose                       95                     Sodium                        139                    Potassium                     3.3 (*)                Chloride                      106                    Bicarbonate                   18 (*)                 Anion Gap                     18                     Urea Nitrogen                 15                     Creatinine                    0.68                   eGFR                          >90                    Calcium                       9.5                    Albumin                       4.7                    Alkaline Phosphatase          53                     Total Protein                 7.3                    AST                           25                     Bilirubin, Total              0.9                    ALT                           20                  LACTATE - Abnormal     Lactate                        2.1 (*)                  Narrative: Venipuncture immediately after or during the administration of Metamizole may lead to falsely low results. Testing should be performed immediately prior to Metamizole dosing.  MAGNESIUM - Normal     Magnesium                     1.96                SARS-COV-2 PCR - Normal     Coronavirus 2019, PCR                                  Narrative: This assay has received FDA Emergency Use Authorization (EUA) and is only authorized for the duration of time that circumstances exist to justify the authorization of the emergency use of in vitro diagnostic tests for the detection of SARS-CoV-2 virus and/or diagnosis of COVID-19 infection under section 564(b)(1) of the Act, 21 U.S.C. 360bbb-3(b)(1). This assay is an in vitro diagnostic nucleic acid amplification test for the qualitative detection of SARS-CoV-2 from nasopharyngeal specimens and has been validated for use at [facility]. Negative results do not preclude COVID-19 infections and should not be used as the sole basis for diagnosis, treatment, or other management decisions.                  HUMAN CHORIONIC GONADOTROPIN, SERUM QUANTITATIVE - Normal     HCG, Beta-Quantitative        <2                       Narrative:  Total HCG measurement is performed using the [employer]                 Immunoassay which detects intact HCG and free beta HCG subunit.                  This test is not indicated for use as a tumor marker.                 HCG testing is performed using a different test methodology at [facility] than other [facility] hospitals. Direct result comparison                 should only be made within the same method.                    SERIAL TROPONIN-INITIAL - Normal     Troponin I, High Sensitivity   <3                       Narrative: Less than 99th percentile of normal range cutoff-                Female and children under 18 years old <14 ng/L; Male <21 ng/L:  Negative                Repeat testing should be performed if clinically indicated.                                 Female and children under 18 years old 14-50 ng/L; Male 21-50 ng/L:                Consistent with possible cardiac damage and possible increased clinical                 risk. Serial measurements may help to assess extent of myocardial damage.                                 >50 ng/L: Consistent with cardiac damage, increased clinical risk and                myocardial infarction. Serial measurements may help assess extent of                 myocardial damage.                                  NOTE: Children less than 1 year old may have higher baseline troponin                 levels and results should be interpreted in conjunction with the overall                 clinical context.                                 NOTE: Troponin I testing is performed using a different                 testing methodology at [facility] than at other                 [facility]. Direct result comparisons should only                 be made within the same method.  TSH WITH REFLEX TO FREE T4 IF ABNORMAL - Normal     Thyroid Stimulating Hormone   0.88                     Narrative: TSH testing is performed using different testing methodology at [facility] than at other [facility]. Direct result comparisons should only be made within the same method.                  CBC WITH AUTO DIFFERENTIAL     WBC                           8.4                    nRBC                          0.0                    RBC                           5.07                   Hemoglobin                    14.9                   Hematocrit                    43.7                   MCV                           86                     MCH                           29.4                   MCHC                          34.1                   RDW                           12.5                   Platelets                      201                    Neutrophils %                 68.7                   Immature Granulocytes %, Automated   0.2                    Lymphocytes %                 24.2                   Monocytes %                   5.8                    Eosinophils %                 0.6                    Basophils %                   0.5                    Neutrophils Absolute          5.78                   Immature Granulocytes Absolute, Au*   0.02                   Lymphocytes Absolute          2.04                   Monocytes Absolute            0.49                   Eosinophils Absolute          0.05                   Basophils Absolute            0.04                TROPONIN SERIES- (INITIAL, 1 HR)       Narrative: The following orders were created for panel order Troponin I Series, High Sensitivity (0, 1 HR).                Procedure                               Abnormality         Status                                   ---------                               -----------         ------                                   Troponin I, High Sensiti...[[id number]]  Normal              Final result                             Troponin, High Sensitivi...[[id number]]                                                                               Please view results for these tests on the individual orders.  SERIAL TROPONIN, 1 HOUR   CT angio chest for pulmonary embolism   Final Result    1. Allowing for presumed prominent central beam hardening artifact    which limits the accuracy of the assessment, no evidence of acute    pulmonary embolism to  proximal segmental level. Please note that,    assessment of distal segmental and subsegmental branches is limited    and small peripheral emboli are not entirely excluded. If there    continues to be clinical concern further evaluation with nuclear    medicine perfusion scan or repeat CT scan could be considered.    2. Interval resolution of left-sided pneumonia.    3.  Persistent nodular opacity right base improved since 2021    examination and slight additional improvement in compared to the most    recent examination favors postinflammatory scar or residual    inflammation. Attention recommended on follow-up assessment.          MACRO:    None          Signed by: Mike Dias 12/3/2024 6:23 PM    Dictation workstation:   [id number]                            Tests/Medications/Escalations of Care considered but not given: As in [facility]    Patient care discussed with: N/A  Social Determinants affecting care: N/A    Final diagnosis and disposition as documented     ED Course as of 12/03/24 1833  ------------------------------------------------------------  Time: 12/03 1712  Comment: EKG completed at 1659 shows on my interpretation sinus tachycardia with a ventricular rate of 150 bpm.  No signs of STEMI.  Normal axis.  By: Mark Anthony Cook PA-C    ------------------------------------------------------------  Diagnoses as of 12/03/24 1833  Shortness of breath  Tachycardia       Shared decision making was completed and determined that patient will be discharged. I discussed the differential; results and discharge plan with the patient and/or family/friend/caregiver if present.  I emphasized the importance of follow-up with the physician I referred them to in the timeframe recommended.  I explained reasons for the patient to return to the Emergency Department. They agreed that if they feel their condition is worsening or if they have any other concern they should call 911 immediately for further assistance. I gave the patient an opportunity to ask all questions they had and answered all of them accordingly. They understand return precautions and discharge instructions. The patient and/or family/friend/caregiver expressed understanding verbally and that they would comply.     Disposition: Discharge      This note has been transcribed using voice recognition and may contain grammatical  errors, misplaced words, incorrect words, incorrect phrases or other errors.         Procedure  Procedures     Mark Anthony Cook PA-C  12/03/24 3139

## 2024-12-03 NOTE — PROGRESS NOTES
"Subjective   Patient ID: Karen Thomson is a 39 y.o. female who presents for Pneumonia (Had pneumonia in October, had x ray 3 weeks ago and it showed it was still in lungs. She does not feel good still ).    HPI   Pt in today still not feeling well. She did have a chest xray 3 weeks ago and it did show improvement. She feel that she is still getting winded. She does check her Oxygen and it was 98%. She just feels weak. Coughing up phlegm thick green mucus. She has been on two different antibiotics and feels that she is just not getting better. Denies fever or chills.     Review of Systems    Objective   /83   Pulse (!) 122   Ht 1.6 m (5' 3\")   Wt 67.6 kg (149 lb)   SpO2 98%   BMI 26.39 kg/m²     Physical Exam  Vitals reviewed.   Constitutional:       Appearance: Normal appearance.   Cardiovascular:      Rate and Rhythm: Normal rate and regular rhythm.      Pulses: Normal pulses.      Heart sounds: Normal heart sounds.   Pulmonary:      Effort: Pulmonary effort is normal.      Breath sounds: Normal breath sounds.   Neurological:      General: No focal deficit present.      Mental Status: She is alert and oriented to person, place, and time.   Psychiatric:         Mood and Affect: Mood normal.         Behavior: Behavior normal.       Assessment/Plan   Diagnoses and all orders for this visit:  Pneumonia due to infectious organism, unspecified laterality, unspecified part of lung  Comments:  Levaquin ordered   Sputum culture ordered  Orders:  -     Referral to Pulmonology; Future  -     levoFLOXacin (Levaquin) 500 mg tablet; Take 1 tablet (500 mg) by mouth once daily for 10 days.  -     Comprehensive metabolic panel; Future  -     CBC and Auto Differential; Future  -     Mononucleosis screen; Future  -     Respiratory Culture/Smear       "

## 2024-12-03 NOTE — ED TRIAGE NOTES
Pt BIB POV from home w/ c/o palpitations, sob, chills. Cough in AM w/ green production.   Here in October, went home, still not better, went to [facility], admitted there w/ high HR, believed to be r/t PNA dx.   3 weeks ago felt unwell again, went to [facility], another round of abx. Since Friday, feeling unwell again. Palpitations started yesterday. Has had home heart monitor for 2 weeks recently.

## 2024-12-04 LAB
ATRIAL RATE: 150 BPM
P AXIS: 62 DEGREES
PR INTERVAL: 128 MS
Q ONSET: 219 MS
QRS COUNT: 25 BEATS
QRS DURATION: 74 MS
QT INTERVAL: 340 MS
QTC CALCULATION(BAZETT): 537 MS
QTC FREDERICIA: 461 MS
R AXIS: -6 DEGREES
T AXIS: 60 DEGREES
T OFFSET: 389 MS
VENTRICULAR RATE: 150 BPM

## 2024-12-09 ENCOUNTER — OFFICE VISIT (OUTPATIENT)
Dept: CARDIOLOGY | Facility: CLINIC | Age: 39
End: 2024-12-09
Payer: COMMERCIAL

## 2024-12-09 VITALS
RESPIRATION RATE: 16 BRPM | OXYGEN SATURATION: 100 % | DIASTOLIC BLOOD PRESSURE: 89 MMHG | SYSTOLIC BLOOD PRESSURE: 122 MMHG | BODY MASS INDEX: 26.05 KG/M2 | HEIGHT: 63 IN | HEART RATE: 115 BPM | WEIGHT: 147 LBS

## 2024-12-09 DIAGNOSIS — R00.0 SINUS TACHYCARDIA: Primary | ICD-10-CM

## 2024-12-09 DIAGNOSIS — R00.0 TACHYCARDIA: ICD-10-CM

## 2024-12-09 PROCEDURE — 3008F BODY MASS INDEX DOCD: CPT | Performed by: STUDENT IN AN ORGANIZED HEALTH CARE EDUCATION/TRAINING PROGRAM

## 2024-12-09 PROCEDURE — 99214 OFFICE O/P EST MOD 30 MIN: CPT | Performed by: STUDENT IN AN ORGANIZED HEALTH CARE EDUCATION/TRAINING PROGRAM

## 2024-12-09 PROCEDURE — 1036F TOBACCO NON-USER: CPT | Performed by: STUDENT IN AN ORGANIZED HEALTH CARE EDUCATION/TRAINING PROGRAM

## 2024-12-09 NOTE — PROGRESS NOTES
Primary Care Physician: Brianna Ruiz, APRN-CNP   Date of Visit: 12/09/2024  8:00 AM EST  Type of Visit: new      Chief Complaint:  Chief Complaint   Patient presents with    Rapid Heart Rate        HPI  Karen Thomson 39 y.o. female with no significant medical hx presents today for tachycardia.  Recently she developed PNA, got treated for, then developed reaction to NSAIDs vs tylenol and developed severe sinus tachycardia, she did recover from that, she developed PNA again a few days ago and went to the ED.  Lactic acid was elevated. Currently she is receiving Levaquin.   Her EKGs showed ST, did echo and monitor, both unremarkable  She is here to check on her heart  At baseline, prior to her recent events, she was active, no chest pain, sob, dizziness or syncope  Fhx of CAD, dad passed away in his 50s and mother passed away at age of 60, both from CAD         Review of Systems   Review of Systems   12 points review of systems are negative expect for the above    Social History:  Social History     Socioeconomic History    Marital status:      Spouse name: Not on file    Number of children: Not on file    Years of education: Not on file    Highest education level: Not on file   Occupational History    Occupation: teacher   Tobacco Use    Smoking status: Never     Passive exposure: Never    Smokeless tobacco: Never   Vaping Use    Vaping status: Never Used   Substance and Sexual Activity    Alcohol use: Yes     Comment: socially    Drug use: Never    Sexual activity: Yes     Partners: Male     Birth control/protection: Male Sterilization   Other Topics Concern    Not on file   Social History Narrative    Not on file     Social Drivers of Health     Financial Resource Strain: Low Risk  (10/11/2024)    Overall Financial Resource Strain (CARDIA)     Difficulty of Paying Living Expenses: Not very hard   Recent Concern: Financial Resource Strain - High Risk (10/8/2024)    Overall Financial Resource Strain  (CARDIA)     Difficulty of Paying Living Expenses: Very hard   Food Insecurity: No Food Insecurity (10/11/2024)    Hunger Vital Sign     Worried About Running Out of Food in the Last Year: Never true     Ran Out of Food in the Last Year: Never true   Transportation Needs: No Transportation Needs (10/11/2024)    PRAPARE - Transportation     Lack of Transportation (Medical): No     Lack of Transportation (Non-Medical): No   Physical Activity: Not on file   Stress: Not on file   Social Connections: Not on file   Intimate Partner Violence: Not At Risk (10/11/2024)    Humiliation, Afraid, Rape, and Kick questionnaire     Fear of Current or Ex-Partner: No     Emotionally Abused: No     Physically Abused: No     Sexually Abused: No   Housing Stability: Low Risk  (10/11/2024)    Housing Stability Vital Sign     Unable to Pay for Housing in the Last Year: No     Number of Times Moved in the Last Year: 1     Homeless in the Last Year: No        Past Medical History:  Past Medical History:   Diagnosis Date    Acute pharyngitis, unspecified 08/23/2019    Pharyngitis with viral syndrome    Acute upper respiratory infection, unspecified 10/23/2016    Acute URI    Adjustment disorder with depressed mood 11/25/2019    Adjustment disorder with depressed mood    Anemia complicating pregnancy, unspecified trimester ([facility]) 03/16/2016    Anemia in pregnancy    Anxiety disorder, unspecified 11/25/2019    Acute anxiety    Body mass index (BMI) 39.0-39.9, adult 04/26/2021    Body mass index (BMI) of 39.0 to 39.9 in adult    Change in bowel habit 01/13/2015    Change in bowel habits    Chronic sinusitis, unspecified 05/11/2017    Recurrent sinusitis    Class 1 obesity with body mass index (BMI) of 33.0 to 33.9 in adult 11/08/2023    Diarrhea, unspecified 11/02/2021    Acute diarrhea    Disease of upper respiratory tract, unspecified 11/02/2021    Upper respiratory disease    Dry eye syndrome of bilateral lacrimal glands 01/16/2017     Bilateral dry eyes    Encounter for routine postpartum follow-up 2016    Routine postpartum follow-up    Encounter for screening for human immunodeficiency virus (HIV) 2015    Encounter for screening for HIV    Encounter for screening for other infectious and parasitic diseases 2015    Special screening examination for other specified chlamydial diseases    Encounter for screening for other viral diseases 2015    Encounter for screening for other viral diseases    Fever, unspecified 2021    Fever and chills    Headache, unspecified 2017    Severe headache    History of  section 2020    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    Decreased milk production    Localized enlarged lymph nodes 10/20/2016    Anterior cervical adenopathy    Low vitamin D level 2023    Metrorrhagia 2023    Obesity, unspecified 10/25/2021    Class 2 obesity with body mass index (BMI) of 37.0 to 37.9 in adult    Obesity, unspecified 2021    Class 2 obesity with body mass index (BMI) of 36.0 to 36.9 in adult    Ocular pain, left eye 2017    Acute left eye pain    Ocular pain, left eye 2017    Pain of left eye    Other abnormal and inconclusive findings on diagnostic imaging of breast 2017    Abnormal ultrasound of breast    Other conditions influencing health status 2017    History of cough    Other specified health status     No pertinent past medical history    Other specified noninflammatory disorders of vagina 08/15/2018    Vaginal cyst    Other specified noninflammatory disorders of vagina     Vaginal odor    Palpitations 2015    Palpitations    Personal history of diseases of the skin and subcutaneous tissue 2017    History of skin pruritus    Personal history of diseases of the skin and subcutaneous tissue 2018    History of cyst of breast    Personal history of other complications of pregnancy, childbirth and the puerperium 2020     History of postpartum depression    Personal history of other diseases of the female genital tract 06/27/2015    History of female infertility    Personal history of other diseases of the female genital tract 04/05/2018    History of breast pain    Personal history of other diseases of the nervous system and sense organs 11/08/2021    History of conjunctivitis    Personal history of other diseases of the respiratory system 08/23/2019    History of sore throat    Personal history of other diseases of the respiratory system 03/02/2020    History of acute pharyngitis    Personal history of other diseases of the respiratory system 05/14/2017    History of sinusitis    Personal history of other diseases of the respiratory system 03/20/2019    History of influenza    Personal history of other diseases of urinary system 04/26/2021    H/O acute pyelonephritis    Personal history of other endocrine, nutritional and metabolic disease 06/02/2021    History of obesity    Personal history of other infectious and parasitic diseases 08/23/2019    History of viral gastroenteritis    Personal history of other specified conditions 11/22/2017    History of diarrhea    Personal history of other specified conditions 12/02/2021    History of fatigue    Personal history of other specified conditions 01/13/2015    History of diarrhea    Personal history of other specified conditions 01/13/2015    History of abdominal pain    Personal history of other specified conditions 10/01/2015    History of urinary frequency    Personal history of pneumonia (recurrent) 12/02/2021    History of recurrent pneumonia    Postpartum depression 06/28/2016    Post partum depression    Postpartum depression 04/04/2016    Postpartum depression    Procreative counseling and advice using natural family planning 12/04/2014    Procreative counseling and advice using natural family planning    Rash and other nonspecific skin eruption 11/24/2017    Rash     "Unspecified acute conjunctivitis, bilateral 2020    Acute bacterial conjunctivitis of both eyes       Past Surgical History:  Past Surgical History:   Procedure Laterality Date    OTHER SURGICAL HISTORY  2018    Laparoscopic Aspiration Uterine Mass    OTHER SURGICAL HISTORY  2020     section    TONSILLECTOMY  2015    Tonsillectomy       Family History:  Family History   Problem Relation Name Age of Onset    Other (cardiac disorder) Mother      Other (cardiac disorder) Father      Other (cardiac disorder) Maternal Grandmother      Diabetes Maternal Grandmother      Cancer Maternal Grandmother      Other (cardiac disorder) Paternal Grandmother      Diabetes Paternal Grandmother      Cancer Paternal Grandmother          Objective:       12/3/2024     5:15 PM 12/3/2024     5:30 PM 12/3/2024     6:00 PM 12/3/2024     6:15 PM 12/3/2024     6:30 PM 12/3/2024     6:50 PM 2024     7:56 AM   Vitals   Systolic   127   117 122   Diastolic   86   81 89   BP Location      Right arm Left arm   Heart Rate 124 129 136 130 108 123 115   Temp      37 °C (98.6 °F)    Resp 10 15 11 10 17 14 16   Height       1.6 m (5' 3\")   Weight (lb)       147   BMI       26.04 kg/m2   BSA (m2)       1.72 m2   Visit Report Report Report Report Report Report Report Report      Constitutional:       Appearance: Healthy appearance. Not in distress.   Neck:      Vascular: No JVR. JVD normal.   Pulmonary:      Effort: Pulmonary effort is normal.      Breath sounds: Normal breath sounds. No wheezing. No rhonchi. No rales.   Chest:      Chest wall: Not tender to palpatation.   Cardiovascular:      Normal rate. Regular rhythm. Normal S1. Normal S2.       Murmurs: There is no murmur.      No gallop.  N No rub.   Pulses:     Intact distal pulses.   Edema:     Peripheral edema absent.   Abdominal:      General: Bowel sounds are normal.      Palpations: Abdomen is soft.      Tenderness: There is no abdominal tenderness. " "  Musculoskeletal: Normal range of motion.         General: No tenderness.   Skin:     General: Skin is warm and dry.   Neurological:      General: No focal deficit present.      Mental Status: Alert and oriented to person, place and time.     Allergies:  Allergies   Allergen Reactions    Acetylcysteine Anaphylaxis    Progesterone Hives       Medications:  Current Outpatient Medications   Medication Instructions    cholecalciferol (VITAMIN D-3) 50,000 Units, Weekly    cyanocobalamin (VITAMIN B-12) 1,000 mcg, Daily    hydrOXYzine HCL (ATARAX) 25 mg, Every 6 hours PRN    levoFLOXacin (LEVAQUIN) 500 mg, oral, Daily    multivitamin tablet 1 tablet, Daily    rosuvastatin (CRESTOR) 5 mg, oral, Daily    tirzepatide 15 mg, subcutaneous, Every 7 days        Labs and Imaging:     Lab Results   Component Value Date    WBC 8.4 12/03/2024    HGB 14.9 12/03/2024    HCT 43.7 12/03/2024     12/03/2024    CHOL 191 01/05/2024    TRIG 122 01/05/2024    HDL 63.2 01/05/2024    ALT 20 12/03/2024    AST 25 12/03/2024     12/03/2024    K 3.3 (L) 12/03/2024     12/03/2024    CREATININE 0.68 12/03/2024    BUN 15 12/03/2024    CO2 18 (L) 12/03/2024    TSH 0.88 12/03/2024    INR 1.1 10/09/2024    HGBA1C 4.4 04/17/2020         Echocardiogram:   Echocardiogram     [facility], [address] [place] [address]  Tel [phone] and Fax [phone]    TRANSTHORACIC ECHOCARDIOGRAM REPORT      Patient Name:     KELSIBERENICE Gibbs Physician: [id number] Lynette CASE  Study Date:       12/6/2022         Referring          LYNETTE DUENAS  Physician:  MRN/PID:          [id number]          PCP:  Accession/Order#: [id number]      Department         [facility]  Location:  YOB: 1985         Fellow:  Gender:           F                 Nurse:  Admit Date:                         Sonographer:       Mary Cervantes \"RDCS,  RCS\"  Admission Status: Outpatient        Additional " Staff:  Height:           160.02 cm         CC Report to:  Weight:           86.18 kg          Study Type:        Echocardiogram  BSA:              1.89 m2  Blood Pressure: 117 /82 mmHg    Diagnosis/ICD: R00.2-Palpitations  Indication:    palpitations  Procedure/CPT: Echo Complete w Full Doppler-[id number]    Patient History:  Pertinent History: Family HX of CAD, Palpitations and Dyspnea. Dizziness.    Study Detail: The following Echo studies were performed: 2D, M-Mode, Doppler and  color flow.      PHYSICIAN INTERPRETATION:  Left Ventricle: The left ventricular systolic function is normal, with an estimated ejection fraction of 60-65%. There are no regional wall motion abnormalities. The left ventricular cavity size is normal. The left ventricular septal wall thickness is normal. There is normal left ventricular posterior wall thickness. Spectral Doppler shows a normal pattern of left ventricular diastolic filling.  Left Atrium: The left atrium is normal in size.  Right Ventricle: The right ventricle is normal in size. There is normal right ventricular global systolic function.  Right Atrium: The right atrium is normal in size.  Aortic Valve: The aortic valve is trileaflet. There is no evidence of aortic valve stenosis.  There is no evidence of aortic valve regurgitation. The peak instantaneous gradient of the aortic valve is 4.9 mmHg. The mean gradient of the aortic valve is 3.0 mmHg.  Mitral Valve: The mitral valve is normal in structure. There is trace mitral valve regurgitation.  Tricuspid Valve: The tricuspid valve is structurally normal. There is trace tricuspid regurgitation. The Doppler estimated RVSP is within normal limits at 21.0 mmHg.  Pulmonic Valve: The pulmonic valve is structurally normal. There is physiologic pulmonic valve regurgitation.  Pericardium: There is no pericardial effusion noted.  Aorta: The aortic root is normal.  Systemic Veins: The inferior vena cava was not well visualized.  In  comparison to the previous echocardiogram(s): There are no prior studies on this patient for comparison purposes.      CONCLUSIONS:  1. Left ventricular systolic function is normal with a 60-65% estimated ejection fraction.  2. RVSP within normal limits.  3. No hemodynamically significant valvular dysfunction.  4. The left ventricular septal wall thickness is normal. There is normal left ventricular posterior wall thickness.    QUANTITATIVE DATA SUMMARY:  2D MEASUREMENTS:  Normal Ranges:  Ao Root d:     2.50 cm   (2.0-3.7cm)  LAs:           3.10 cm   (2.7-4.0cm)  IVSd:          0.70 cm   (0.6-1.1cm)  LVPWd:         0.85 cm   (0.6-1.1cm)  LVIDd:         5.00 cm   (3.9-5.9cm)  LVIDs:         2.80 cm  LV Mass Index: 68.9 g/m2  LV % FS        44.0 %    LA VOLUME:  Normal Ranges:  LA Area A4C:     13.9 cm2  LA Area A2C:     16.8 cm2  LA Volume Index: 21.0 ml/m2  LA Vol A4C:      32.0 ml  LA Vol A2C:      49.0 ml  LA Vol BP:       40.0 ml    LV SYSTOLIC FUNCTION BY 2D PLANIMETRY (MOD):  Normal Ranges:  EF-A4C View: 74.2 % (>=55%)  EF-A2C View: 72.6 %  EF-Biplane:  72.6 %    LV DIASTOLIC FUNCTION:  Normal Ranges:  MV Peak E:        0.84 m/s    (0.7-1.2 m/s)  MV Peak A:        0.68 m/s    (0.42-0.7 m/s)  E/A Ratio:        1.22        (1.0-2.2)  MV lateral e'     0.20 m/s  MV medial e'      0.12 m/s  MV A Dur:         111.00 msec  E/e' Ratio:       4.20        (<8.0)  PulmV Sys Jeremías:    44.40 cm/s  PulmV Ward Jeremías:   42.20 cm/s  PulmV S/D Jeremías:    1.10  PulmV A Revs Jeremías: 35.60 cm/s  PulmV A Revs Dur: 116.00 msec    MITRAL VALVE:  Normal Ranges:  MV DT: 161 msec (150-240msec)    AORTIC VALVE:  Normal Ranges:  AoV Vmax:                1.11 m/s (<=1.7m/s)  AoV Peak P.9 mmHg (<20mmHg)  AoV Mean PG:             3.0 mmHg (1.7-11.5mmHg)  LVOT Max Jeremías:            1.20 m/s (<=1.1m/s)  AoV VTI:                 24.80 cm (18-25cm)  LVOT VTI:                26.60 cm  LVOT Diameter:           1.90 cm  (1.8-2.4cm)  AoV Area,  VTI:           3.04 cm2 (2.5-5.5cm2)  AoV Area,Vmax:           3.07 cm2 (2.5-4.5cm2)  AoV Dimensionless Index: 1.07    RIGHT VENTRICLE:  RV 1   3.5 cm  RV 2   3.0 cm  RV 3   5.5 cm  TAPSE: 22.0 mm    TRICUSPID VALVE/RVSP:  Normal Ranges:  Peak TR Velocity: 2.12 m/s  RV Syst Pressure: 21.0 mmHg (< 30mmHg)    PULMONIC VALVE:  Normal Ranges:  PV Max Jeremías: 1.0 m/s  (0.6-0.9m/s)  PV Max PG:  3.9 mmHg    Pulmonary Veins:  PulmV A Revs Dur: 116.00 msec  PulmV A Revs Jeremías: 35.60 cm/s  PulmV Ward Jeremías:   42.20 cm/s  PulmV S/D Jeremías:    1.10  PulmV Sys Jeremías:    44.40 cm/s      [id number] Lynette Duenas MD  Electronically signed on 12/19/2022 at 11:05:21 AM         Final (Updated)     Stress Testing: No results found for this or any previous visit from the past 1825 days.    Cardiac Catheterization: No results found for this or any previous visit from the past 1825 days.    Cardiac Scoring:   CT HEART CALCIUM SCORING WO IV CONTRAST 01/25/2023    Narrative  MRN: [id number]  Patient Name: KELSI CASE    STUDY:  CT CARDIAC SCORING;  1/25/2023 2:39 pm    INDICATION:  abnormal CT  R93.89: Abnormal CT scan, chest.    COMPARISON:  11/22/2021 CT    ACCESSION NUMBER(S):  [id number]    ORDERING CLINICIAN:  LYNETTE DUENAS    TECHNIQUE:  Using prospective ECG gating, CT scan of the coronary arteries was  performed without intravenous contrast. Coronary calcium scoring  was  performed according to the method of Agatston.    FINDINGS:  The score and distribution of calcium in the coronary arteries is as  follows:    LM 0  LAD 0  LCx 0  RCA 0    Total 0    The visualized mid/lower ascending thoracic aorta measures 3.1 cm in  diameter. The heart is normal in size. No pericardial effusion is  present.    No gross evidence of mediastinal or hilar lymphadenopathy or masses  is identified.    The right lobe has a pleural-based 8 mm nodule and surrounding 17 x  20 mm nodular area of consolidation (previously more solid, 25 x 30  mm).    The posterior left  "lower lobe has developed several pleural-based  scars and nodules, the largest 3 mm thick 7 mm diameter along the  posterosuperior aspect of the lower lobe (series 202, image 12/56).    The visualized subdiaphragmatic structures appear intact.    Impression  Coronary artery calcium score of 0*.    The previously noted area of consolidation in the right lower lobe  has decreased in prominence. A residual 7 mm pleural-based nodule  versus a small surrounding area of nodule like consolidation and/or  scarring remains.    The posterior left lower lobe has developed several focal areas of  small pleural-based scarring and thickening, the largest 3 x 7 mm.    Consider follow up non contrast chest CT at 6-12 months, then  consider CT chest at 18-24 months (Eduardo Ludwig et al., Guidelines  for management of incidental pulmonary nodules detected on CT images:  From the Fleischner Society 2017, Radiology. 2017 Jul;284  (1):228-243.) AIME.ACR.IF.2    *Coronary artery calcium scoring may be helpful in predicting the  risk for future coronary heart disease events.  According to the  American College of Cardiology Foundation Clinical Expert Consensus  Task Force, such testing provides important prognostic information in  patients with more than one coronary heart disease risk factor. The  coronary artery calcium score correlates with the annual risk of a  non-fatal myocardial infarction or coronary heart disease death.    Coronary artery score            Annual Risk    0-99                             0.4%  100-399                        1.3%  >400                            2.4%    These three \"breakpoints\" correspond to lower, intermediate and high  risk states for future coronary events.  Such information should be  used, along with appropriate clinical judgment, to make decisions  regarding the intensity of risk factor management strategies to treat  blood lipids and to modify other non-lipid coronary risk " factors.    Reference: Suri P et al. Circulation.  2007; 115:402-426    AAA : No results found for this or any previous visit from the past 1825 days.    OTHER: No results found for this or any previous visit from the past 1825 days.      The 10-year ASCVD risk score (Khushboo ASIF, et al., 2019) is: 0.4%    Values used to calculate the score:      Age: 39 years      Sex: Female      Is Non- : No      Diabetic: No      Tobacco smoker: No      Systolic Blood Pressure: 122 mmHg      Is BP treated: No      HDL Cholesterol: 63.2 mg/dL      Total Cholesterol: 191 mg/dL     Assessment/Plan:   1. Tachycardia  Referral to Cardiology         Karen Adelina Berto 39 y.o. female with fhx of premature CAD, elevated LPA, zero CAC presents today for sinus tachycardia.  Echo and zio unremarkable  Asymptomatic from cardiac stand point     Plan  Provided her with reassurance   No indication for treatment or additional workup  Increase po fluid intake, exercise and healthy diet   Agree with crestor, Ok to start taking after her body recovers from the recent illness   She will continue to follow up with her PCP     Time Spent: I spent  minutes reviewing medical testing, obtaining medical history and counselling and educating on diagnosis and documenting clinical encounter.         ____________________________________________________________  Billy Perera MD   of Medicine  Division of Cardiovascular Medicine   [facility] [place]  [facility]

## 2024-12-31 ENCOUNTER — LAB (OUTPATIENT)
Dept: LAB | Facility: LAB | Age: 39
End: 2024-12-31
Payer: COMMERCIAL

## 2024-12-31 ENCOUNTER — OFFICE VISIT (OUTPATIENT)
Dept: PRIMARY CARE | Facility: CLINIC | Age: 39
End: 2024-12-31
Payer: COMMERCIAL

## 2024-12-31 VITALS
OXYGEN SATURATION: 100 % | HEART RATE: 105 BPM | WEIGHT: 146 LBS | SYSTOLIC BLOOD PRESSURE: 118 MMHG | BODY MASS INDEX: 25.87 KG/M2 | DIASTOLIC BLOOD PRESSURE: 68 MMHG | HEIGHT: 63 IN

## 2024-12-31 DIAGNOSIS — T78.2XXS ANAPHYLAXIS, SEQUELA: ICD-10-CM

## 2024-12-31 DIAGNOSIS — F41.9 ANXIETY: ICD-10-CM

## 2024-12-31 DIAGNOSIS — Z00.00 WELLNESS EXAMINATION: Primary | ICD-10-CM

## 2024-12-31 DIAGNOSIS — E55.9 VITAMIN D DEFICIENCY: ICD-10-CM

## 2024-12-31 PROCEDURE — 3008F BODY MASS INDEX DOCD: CPT

## 2024-12-31 PROCEDURE — 82785 ASSAY OF IGE: CPT

## 2024-12-31 PROCEDURE — 99213 OFFICE O/P EST LOW 20 MIN: CPT

## 2024-12-31 PROCEDURE — 86003 ALLG SPEC IGE CRUDE XTRC EA: CPT

## 2024-12-31 PROCEDURE — 99396 PREV VISIT EST AGE 40-64: CPT

## 2024-12-31 PROCEDURE — 1036F TOBACCO NON-USER: CPT

## 2024-12-31 RX ORDER — EPINEPHRINE 0.3 MG/.3ML
1 INJECTION SUBCUTANEOUS AS NEEDED
Qty: 1 EACH | Refills: 0 | Status: SHIPPED | OUTPATIENT
Start: 2024-12-31 | End: 2025-12-31

## 2024-12-31 RX ORDER — ALPRAZOLAM 0.25 MG/1
0.25 TABLET ORAL 3 TIMES DAILY PRN
Qty: 5 TABLET | Refills: 0 | Status: SHIPPED | OUTPATIENT
Start: 2024-12-31 | End: 2025-08-28

## 2024-12-31 RX ORDER — CITALOPRAM 10 MG/1
10 TABLET ORAL DAILY
Qty: 30 TABLET | Refills: 1 | Status: SHIPPED | OUTPATIENT
Start: 2024-12-31 | End: 2025-03-01

## 2024-12-31 ASSESSMENT — ENCOUNTER SYMPTOMS
COUGH: 0
SEIZURES: 0
SHORTNESS OF BREATH: 0
NERVOUS/ANXIOUS: 1
BACK PAIN: 0
IRRITABILITY: 0
RESTLESSNESS: 1
NAUSEA: 0
PALPITATIONS: 1
ARTHRALGIAS: 0
HEMATURIA: 0
SORE THROAT: 0
LIGHT-HEADEDNESS: 0
UNEXPECTED WEIGHT CHANGE: 0
FATIGUE: 0
WOUND: 0
RHINORRHEA: 0
FEVER: 0
WHEEZING: 0
WEAKNESS: 0
FACIAL ASYMMETRY: 0
SINUS PRESSURE: 0
ABDOMINAL PAIN: 0
TROUBLE SWALLOWING: 0
JOINT SWELLING: 0
COMPULSIONS: 1
DYSURIA: 0
SINUS PAIN: 0
CONSTIPATION: 0
HYPERVENTILATION: 1
PANIC: 1

## 2024-12-31 NOTE — PROGRESS NOTES
Subjective   Patient ID: Karen Thomson is a 39 y.o. female who presents for Annual Exam (Annual CPE/panic attacks since hospital stay after allergic reaction to medication.).    Pt is here for annual wellness.  Reports overall health is better than it has been I feel really good    Do you take any herbs or supplements that were not prescribed by a doctor? no  Are you taking calcium supplements? no  Are you taking aspirin daily? no  Colonoscopy: n/a  Fasting blood work: uptodate  Last eye exam: uptodate  Last dental Exam: every 6  Exercise:some  Mood:pleasant  Sleep: not great with current anxiety  Diet:  good taking zepbound  Occupation:  teacher  Do you have pain that bothers you in your daily life? no    1. Patient Counseling:  --Nutrition: Stressed importance of moderation in sodium/caffeine intake, saturated fat and cholesterol, caloric balance, sufficient intake of fresh fruits, vegetables, fiber, calcium, iron, and 1 mg of folate supplement per day (for females capable of pregnancy).  --Discussed the issue of estrogen replacement, calcium supplement, and the daily use of baby aspirin as appropriate per pt.  --Exercise: Stressed the importance of regular exercise.   --Substance Abuse: Discussed cessation/primary prevention of tobacco, alcohol, or other drug use; driving or other dangerous activities under the influence; availability of treatment for abuse.    --Sexuality: Discussed sexually transmitted diseases, partner selection, use of condoms, avoidance of unintended pregnancy  and contraceptive alternatives.   --Injury prevention: Discussed safety belts, safety helmets, smoke detector, smoking near bedding or upholstery.   --Dental health: Discussed importance of regular tooth brushing, flossing, and dental visits.  --Immunizations reviewed.  --Discussed benefits of colon cancer screening.  --After hours service discussed with patient  2 Discussed the patient's BMI.  The BMI is in the acceptable  "range.  3 Follow up as needed for acute illness        Anxiety  Presents for initial visit. Onset was 1 to 4 weeks ago. The problem has been unchanged. Symptoms include chest pain, compulsions, excessive worry, hyperventilation, nervous/anxious behavior, palpitations, panic and restlessness. Patient reports no dry mouth, irritability, nausea, shortness of breath or suicidal ideas. Symptoms occur most days. The most recent episode lasted 2 hours. The severity of symptoms is interfering with daily activities. Exacerbated by: recent anaphylaxis from medication. The quality of sleep is fair. Nighttime awakenings: several.     Risk factors include a major life event. There is no history of asthma, bipolar disorder, CAD, depression, fibromyalgia or suicide attempts. Past treatments include nothing.        Review of Systems   Constitutional:  Negative for fatigue, fever, irritability and unexpected weight change.   HENT:  Negative for congestion, ear discharge, ear pain, nosebleeds, postnasal drip, rhinorrhea, sinus pressure, sinus pain, sneezing, sore throat and trouble swallowing.    Respiratory:  Negative for cough, shortness of breath and wheezing.    Cardiovascular:  Positive for chest pain and palpitations. Negative for leg swelling.   Gastrointestinal:  Negative for abdominal pain, constipation and nausea.   Genitourinary:  Negative for dysuria, hematuria, menstrual problem, pelvic pain, vaginal bleeding and vaginal pain.        Follows [facility] dr [facility]     Musculoskeletal:  Negative for arthralgias, back pain, gait problem and joint swelling.   Skin:  Negative for rash and wound.   Neurological:  Negative for seizures, facial asymmetry, weakness and light-headedness.   Psychiatric/Behavioral:  Negative for suicidal ideas. The patient is nervous/anxious.        Objective   Pulse 105   Ht 1.6 m (5' 3\")   Wt 66.2 kg (146 lb)   LMP 11/20/2024 (Approximate)   SpO2 100%   BMI 25.86 kg/m²     Physical " Exam  Constitutional:       Appearance: Normal appearance.   HENT:      Head: Normocephalic and atraumatic.      Right Ear: Tympanic membrane and external ear normal.      Left Ear: Tympanic membrane and external ear normal.      Nose: Nose normal.      Mouth/Throat:      Mouth: Mucous membranes are moist.      Pharynx: Oropharynx is clear. Uvula midline.   Eyes:      General: Lids are normal.      Extraocular Movements: Extraocular movements intact.      Pupils: Pupils are equal, round, and reactive to light.   Neck:      Thyroid: No thyromegaly or thyroid tenderness.   Cardiovascular:      Rate and Rhythm: Normal rate and regular rhythm.      Heart sounds: Normal heart sounds.   Pulmonary:      Effort: Pulmonary effort is normal.      Breath sounds: Normal breath sounds.   Abdominal:      General: Bowel sounds are normal.      Palpations: Abdomen is soft.      Tenderness: There is no abdominal tenderness. There is no guarding.   Musculoskeletal:         General: No swelling. Normal range of motion.      Cervical back: Normal range of motion.      Right lower leg: No edema.      Left lower leg: No edema.   Lymphadenopathy:      Head:      Right side of head: No submental, submandibular or tonsillar adenopathy.      Left side of head: No submental, submandibular or tonsillar adenopathy.      Cervical: No cervical adenopathy.   Skin:     General: Skin is warm and dry.      Capillary Refill: Capillary refill takes less than 2 seconds.      Coloration: Skin is not jaundiced.      Findings: No lesion or rash.   Neurological:      General: No focal deficit present.      Mental Status: She is alert and oriented to person, place, and time.   Psychiatric:         Mood and Affect: Mood normal.         Behavior: Behavior normal.         Thought Content: Thought content normal.         Judgment: Judgment normal.          Assessment/Plan   Karen was seen today for annual exam.  Diagnoses and all orders for this  visit:  Wellness examination  Vitamin D deficiency  Anxiety  -     citalopram (CeleXA) 10 mg tablet; Take 1 tablet (10 mg) by mouth once daily.  -     ALPRAZolam (Xanax) 0.25 mg tablet; Take 1 tablet (0.25 mg) by mouth 3 times a day as needed for anxiety.  Anaphylaxis, sequela  -     Food Allergy Profile IgE; Future  -     Respiratory Allergy Profile IgE; Future  -     EPINEPHrine (Epipen) 0.3 mg/0.3 mL injection syringe; Inject 0.3 mL (0.3 mg) into the muscle if needed for anaphylaxis. Call 911 after use.

## 2025-01-02 ENCOUNTER — APPOINTMENT (OUTPATIENT)
Dept: PRIMARY CARE | Facility: CLINIC | Age: 40
End: 2025-01-02
Payer: COMMERCIAL

## 2025-01-02 LAB
A ALTERNATA IGE QN: <0.1 KU/L
A FUMIGATUS IGE QN: <0.1 KU/L
BERMUDA GRASS IGE QN: <0.1 KU/L
BOXELDER IGE QN: <0.1 KU/L
C HERBARUM IGE QN: <0.1 KU/L
CALIF WALNUT POLN IGE QN: <0.1 KU/L
CAT DANDER IGE QN: <0.1 KU/L
CLAM IGE QN: <0.1 KU/L
CMN PIGWEED IGE QN: <0.1 KU/L
CODFISH IGE QN: <0.1 KU/L
COMMON RAGWEED IGE QN: <0.1 KU/L
CORN IGE QN: <0.1
COTTONWOOD IGE QN: <0.1 KU/L
D FARINAE IGE QN: <0.1 KU/L
D PTERONYSS IGE QN: <0.1 KU/L
DOG DANDER IGE QN: <0.1 KU/L
EGG WHITE IGE QN: <0.1 KU/L
ENGL PLANTAIN IGE QN: <0.1 KU/L
GOOSEFOOT IGE QN: <0.1 KU/L
JOHNSON GRASS IGE QN: <0.1 KU/L
KENT BLUE GRASS IGE QN: <0.1 KU/L
LONDON PLANE IGE QN: <0.1 KU/L
MILK IGE QN: <0.1 KU/L
MT JUNIPER IGE QN: <0.1 KU/L
P NOTATUM IGE QN: <0.1 KU/L
PEANUT IGE QN: <0.1 KU/L
PECAN/HICK TREE IGE QN: <0.1 KU/L
ROACH IGE QN: <0.1 KU/L
SALTWORT IGE QN: <0.1 KU/L
SCALLOP IGE QN: <0.1 KU/L
SESAME SEED IGE QN: <0.1 KU/L
SHEEP SORREL IGE QN: <0.1 KU/L
SHRIMP IGE QN: <0.1 KU/L
SILVER BIRCH IGE QN: <0.1 KU/L
SOYBEAN IGE QN: <0.1 KU/L
TIMOTHY IGE QN: <0.1 KU/L
TOTAL IGE SMQN RAST: 21.1 KU/L
WALNUT IGE QN: <0.1 KU/L
WHEAT IGE QN: <0.1 KU/L
WHITE ASH IGE QN: <0.1 KU/L
WHITE ELM IGE QN: <0.1 KU/L
WHITE MULBERRY IGE QN: <0.1 KU/L
WHITE OAK IGE QN: <0.1 KU/L

## 2025-01-09 ENCOUNTER — PATIENT OUTREACH (OUTPATIENT)
Dept: PRIMARY CARE | Facility: CLINIC | Age: 40
End: 2025-01-09
Payer: COMMERCIAL

## 2025-01-09 DIAGNOSIS — F41.9 ANXIETY: ICD-10-CM

## 2025-01-09 RX ORDER — CITALOPRAM 20 MG/1
20 TABLET, FILM COATED ORAL DAILY
Qty: 30 TABLET | Refills: 1 | Status: SHIPPED | OUTPATIENT
Start: 2025-01-09 | End: 2025-03-10

## 2025-01-21 ENCOUNTER — OFFICE VISIT (OUTPATIENT)
Dept: URGENT CARE | Age: 40
End: 2025-01-21
Payer: COMMERCIAL

## 2025-01-21 VITALS
HEART RATE: 94 BPM | BODY MASS INDEX: 25.15 KG/M2 | OXYGEN SATURATION: 99 % | SYSTOLIC BLOOD PRESSURE: 118 MMHG | DIASTOLIC BLOOD PRESSURE: 85 MMHG | TEMPERATURE: 97.8 F | RESPIRATION RATE: 20 BRPM | WEIGHT: 142 LBS

## 2025-01-21 DIAGNOSIS — J02.9 SORE THROAT: Primary | ICD-10-CM

## 2025-01-21 DIAGNOSIS — J02.9 PHARYNGITIS, UNSPECIFIED ETIOLOGY: ICD-10-CM

## 2025-01-21 LAB
POC RAPID INFLUENZA A: NEGATIVE
POC RAPID INFLUENZA B: NEGATIVE
POC RAPID STREP: NEGATIVE
POC SARS-COV-2 AG BINAX: NORMAL

## 2025-01-21 PROCEDURE — 99214 OFFICE O/P EST MOD 30 MIN: CPT

## 2025-01-21 PROCEDURE — 87811 SARS-COV-2 COVID19 W/OPTIC: CPT

## 2025-01-21 PROCEDURE — 1036F TOBACCO NON-USER: CPT

## 2025-01-21 PROCEDURE — 87880 STREP A ASSAY W/OPTIC: CPT

## 2025-01-21 PROCEDURE — 87804 INFLUENZA ASSAY W/OPTIC: CPT

## 2025-01-21 RX ORDER — AMOXICILLIN 500 MG/1
500 CAPSULE ORAL 2 TIMES DAILY
Qty: 20 CAPSULE | Refills: 0 | Status: SHIPPED | OUTPATIENT
Start: 2025-01-21 | End: 2025-01-31

## 2025-01-21 ASSESSMENT — ENCOUNTER SYMPTOMS: SORE THROAT: 1

## 2025-01-21 NOTE — PROGRESS NOTES
Subjective   Patient ID: Karen Thomson is a 39 y.o. female. They present today with a chief complaint of Sore Throat (For 4 days; pt declined vitals and requested covid-19 and flu testing).    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    Progesterone Hives 04/19/2023       (Not in a hospital admission)       Past Medical History:   Diagnosis Date    Acute pharyngitis, unspecified 08/23/2019    Pharyngitis with viral syndrome    Acute upper respiratory infection, unspecified 10/23/2016    Acute URI    Adjustment disorder with depressed mood 11/25/2019    Adjustment disorder with depressed mood    Anemia complicating pregnancy, unspecified trimester ([facility]) 03/16/2016    Anemia in pregnancy    Anxiety disorder, unspecified 11/25/2019    Acute anxiety    Body mass index (BMI) 39.0-39.9, adult 04/26/2021    Body mass index (BMI) of 39.0 to 39.9 in adult    Change in bowel habit 01/13/2015    Change in bowel habits    Chronic sinusitis, unspecified 05/11/2017    Recurrent sinusitis    Class 1 obesity with body mass index (BMI) of 33.0 to 33.9 in adult 11/08/2023    Diarrhea, unspecified 11/02/2021    Acute diarrhea    Disease of upper respiratory tract, unspecified 11/02/2021    Upper respiratory disease    Dry eye syndrome of bilateral lacrimal glands 01/16/2017    Bilateral dry eyes    Encounter for routine postpartum follow-up 03/16/2016    Routine postpartum follow-up    Encounter for screening for human immunodeficiency virus (HIV) 04/27/2015    Encounter for screening for HIV    Encounter for screening for other infectious and parasitic diseases 04/27/2015    Special screening examination for other specified chlamydial diseases    Encounter for screening for other viral diseases 04/27/2015    Encounter for screening for other viral diseases    Fever, unspecified 11/02/2021     Fever and chills    Headache, unspecified 2017    Severe headache    History of  section 2020    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    Decreased milk production    Localized enlarged lymph nodes 10/20/2016    Anterior cervical adenopathy    Low vitamin D level 2023    Metrorrhagia 2023    Obesity, unspecified 10/25/2021    Class 2 obesity with body mass index (BMI) of 37.0 to 37.9 in adult    Obesity, unspecified 2021    Class 2 obesity with body mass index (BMI) of 36.0 to 36.9 in adult    Ocular pain, left eye 2017    Acute left eye pain    Ocular pain, left eye 2017    Pain of left eye    Other abnormal and inconclusive findings on diagnostic imaging of breast 2017    Abnormal ultrasound of breast    Other conditions influencing health status 2017    History of cough    Other specified health status     No pertinent past medical history    Other specified noninflammatory disorders of vagina 08/15/2018    Vaginal cyst    Other specified noninflammatory disorders of vagina     Vaginal odor    Palpitations 2015    Palpitations    Personal history of diseases of the skin and subcutaneous tissue 2017    History of skin pruritus    Personal history of diseases of the skin and subcutaneous tissue 2018    History of cyst of breast    Personal history of other complications of pregnancy, childbirth and the puerperium 2020    History of postpartum depression    Personal history of other diseases of the female genital tract 2015    History of female infertility    Personal history of other diseases of the female genital tract 2018    History of breast pain    Personal history of other diseases of the nervous system and sense organs 2021    History of conjunctivitis    Personal history of other diseases of the respiratory system 2019    History of sore throat    Personal history of other diseases of the respiratory  system 2020    History of acute pharyngitis    Personal history of other diseases of the respiratory system 2017    History of sinusitis    Personal history of other diseases of the respiratory system 2019    History of influenza    Personal history of other diseases of urinary system 2021    H/O acute pyelonephritis    Personal history of other endocrine, nutritional and metabolic disease 2021    History of obesity    Personal history of other infectious and parasitic diseases 2019    History of viral gastroenteritis    Personal history of other specified conditions 2017    History of diarrhea    Personal history of other specified conditions 2021    History of fatigue    Personal history of other specified conditions 2015    History of diarrhea    Personal history of other specified conditions 2015    History of abdominal pain    Personal history of other specified conditions 10/01/2015    History of urinary frequency    Personal history of pneumonia (recurrent) 2021    History of recurrent pneumonia    Postpartum depression 2016    Post partum depression    Postpartum depression 2016    Postpartum depression    Procreative counseling and advice using natural family planning 2014    Procreative counseling and advice using natural family planning    Rash and other nonspecific skin eruption 2017    Rash    Unspecified acute conjunctivitis, bilateral 2020    Acute bacterial conjunctivitis of both eyes       Past Surgical History:   Procedure Laterality Date    OTHER SURGICAL HISTORY  2018    Laparoscopic Aspiration Uterine Mass    OTHER SURGICAL HISTORY  2020     section    TONSILLECTOMY  2015    Tonsillectomy        reports that she has never smoked. She has never been exposed to tobacco smoke. She has never used smokeless tobacco. She reports current alcohol use. She reports that she does not  use drugs.    Review of Systems  Review of Systems   HENT:  Positive for sore throat.    All other systems reviewed and are negative.                                 Objective    Vitals:    01/21/25 0808   Resp: 20   Temp: 36.6 °C (97.8 °F)   TempSrc: Temporal   Weight: 64.4 kg (142 lb)     No LMP recorded.    Physical Exam  Vitals reviewed.   Constitutional:       Appearance: Normal appearance.   HENT:      Head: Normocephalic and atraumatic.      Right Ear: Tympanic membrane, ear canal and external ear normal.      Left Ear: Tympanic membrane, ear canal and external ear normal.      Nose: Nose normal.      Mouth/Throat:      Mouth: Mucous membranes are moist.      Pharynx: Oropharynx is clear. Posterior oropharyngeal erythema present.   Eyes:      Extraocular Movements: Extraocular movements intact.      Conjunctiva/sclera: Conjunctivae normal.      Pupils: Pupils are equal, round, and reactive to light.   Cardiovascular:      Rate and Rhythm: Normal rate and regular rhythm.      Pulses: Normal pulses.      Heart sounds: Normal heart sounds.   Pulmonary:      Effort: Pulmonary effort is normal.      Breath sounds: Normal breath sounds.   Musculoskeletal:         General: Normal range of motion.      Cervical back: Normal range of motion.   Skin:     General: Skin is warm.      Capillary Refill: Capillary refill takes less than 2 seconds.   Neurological:      General: No focal deficit present.      Mental Status: She is alert and oriented to person, place, and time.   Psychiatric:         Mood and Affect: Mood normal.         Behavior: Behavior normal.         Procedures    Point of Care Test & Imaging Results from this visit  Results for orders placed or performed in visit on 01/21/25   POCT Covid-19 Rapid Antigen   Result Value Ref Range    POC KARTHIK-COV-2 AG  Presumptive negative test for SARS-CoV-2 (no antigen detected)     Presumptive negative test for SARS-CoV-2 (no antigen detected)   POCT Influenza A/B  manually resulted   Result Value Ref Range    POC Rapid Influenza A Negative Negative    POC Rapid Influenza B Negative Negative   POCT rapid strep A manually resulted   Result Value Ref Range    POC Rapid Strep Negative Negative      No results found.    Diagnostic study results (if any) were reviewed by LAUREN Gonsales.    Assessment/Plan   Allergies, medications, history, and pertinent labs/EKGs/Imaging reviewed by LAUREN Gonsales.     Medical Decision Making  39-year-old female presents for sore throat.  4 days she denies fever or chills.  She has mild gland swelling.  On exam she is nontoxic appearing in no acute distress vital signs are stable heart rate is regular lungs are clear bilaterally there is mild erythema in throat patent airway mild cervical lymphadenopathy.  Patient is to start amoxicillin as directed COVID flu and strep are negative.  Patient denies fatigue abdominal pain or nausea vomiting.  Patient is to follow-up with her primary care doctor or go to the emergency department if symptoms worsen patient is to use warm salt water gargles daily Tylenol for fever or pain patient agrees with plan of care patient left in stable condition    Orders and Diagnoses  Diagnoses and all orders for this visit:  Sore throat  -     POCT Covid-19 Rapid Antigen  -     POCT Influenza A/B manually resulted  -     POCT rapid strep A manually resulted      Medical Admin Record      Patient disposition: Home    Electronically signed by LAUREN Gonsales  8:29 AM

## 2025-01-22 ENCOUNTER — APPOINTMENT (OUTPATIENT)
Dept: PULMONOLOGY | Facility: CLINIC | Age: 40
End: 2025-01-22
Payer: COMMERCIAL

## 2025-01-28 ENCOUNTER — APPOINTMENT (OUTPATIENT)
Dept: PULMONOLOGY | Facility: CLINIC | Age: 40
End: 2025-01-28
Payer: COMMERCIAL

## 2025-01-28 VITALS
DIASTOLIC BLOOD PRESSURE: 82 MMHG | BODY MASS INDEX: 25.83 KG/M2 | SYSTOLIC BLOOD PRESSURE: 122 MMHG | OXYGEN SATURATION: 99 % | WEIGHT: 145.8 LBS | HEART RATE: 82 BPM

## 2025-01-28 DIAGNOSIS — Z87.01 H/O RECURRENT PNEUMONIA: Primary | ICD-10-CM

## 2025-01-28 DIAGNOSIS — J18.9 PNEUMONIA DUE TO INFECTIOUS ORGANISM, UNSPECIFIED LATERALITY, UNSPECIFIED PART OF LUNG: ICD-10-CM

## 2025-01-28 PROCEDURE — 1036F TOBACCO NON-USER: CPT | Performed by: PEDIATRICS

## 2025-01-28 PROCEDURE — 99205 OFFICE O/P NEW HI 60 MIN: CPT | Performed by: PEDIATRICS

## 2025-01-28 ASSESSMENT — PATIENT HEALTH QUESTIONNAIRE - PHQ9
2. FEELING DOWN, DEPRESSED OR HOPELESS: NOT AT ALL
SUM OF ALL RESPONSES TO PHQ9 QUESTIONS 1 AND 2: 0
1. LITTLE INTEREST OR PLEASURE IN DOING THINGS: NOT AT ALL

## 2025-01-28 NOTE — PROGRESS NOTES
"Subjective   Patient ID: Karen Thomson is a 39 y.o. female who presents for recurrent pneumonia    HPI    Karen is a 39yr old that had a LLL pneumonia in October, and remained ill through November.  She is now feeling well.  She had a CXR in November that was reported to have persistent LLL infiltrate but it had actually cleared.  She had a CT chest in December that showed resolution of the LLL pneumonia but there is a persistent RLL wedge shaped nodule that is likely scar.  She is concerned because she has had pneumonia 3 times, this recent episode, in 2021 and in 2017.  Between these episodes she is fine.  When she was being worked up for IVF she had some labs and \"something\" was very low she that doctor gave her the pneumococcal vaccine.    She never smoked    Review of Systems    See scanned documents attached to this note for review of systems, and appropriate scales/scores for this visit.     Objective   Physical Exam  Constitutional:       Appearance: Normal appearance.   HENT:      Head: Normocephalic and atraumatic.      Mouth/Throat:      Pharynx: Oropharynx is clear.   Cardiovascular:      Rate and Rhythm: Normal rate and regular rhythm.      Pulses: Normal pulses.      Heart sounds: Normal heart sounds.   Pulmonary:      Effort: Pulmonary effort is normal.      Breath sounds: Normal breath sounds. No wheezing, rhonchi or rales.   Abdominal:      General: Bowel sounds are normal.      Palpations: Abdomen is soft.   Musculoskeletal:         General: Normal range of motion.   Skin:     General: Skin is warm and dry.   Neurological:      General: No focal deficit present.      Mental Status: She is alert and oriented to person, place, and time.   Psychiatric:         Mood and Affect: Mood normal.       Assessment/Plan     39yr old with recent pneumonia but history of pneumonia 3 times in total.  No other significant illnesses    Pneumonia:  recovered now (took over a month with 2 rounds of " antibiotics    2. ?immune issues:  3 pneumonias but years apart with no significant infectious issues between.  A lab value prompted another physician to give her the pneumococcal vaccine in the past.    - she will get the records from that encounter  - ordered immunoglobulin levels    3. RLL nodule:  likely scar from previous pneumonia.  - will get repeat CT in 1 year, if stable no further imaging    Follow up 1-2 months (virtual is ok)            Wallace Ritchie MD 01/28/25 7:46 AM

## 2025-01-28 NOTE — LETTER
"January 28, 2025     Fang Sherman APRN-CNP  [address]  [facility] [address]  [facility] [id number]    Patient: Karen Thomson   YOB: 1985   Date of Visit: 1/28/2025       Dear Dr. Fang Sherman, APRN-CNP:    Thank you for referring Karen Thomson to me for evaluation. Below are my notes for this consultation.  If you have questions, please do not hesitate to call me. I look forward to following your patient along with you.       Sincerely,     Wallace Ritchie MD      CC: JHONATAN Mcdermott-PAMELA  ______________________________________________________________________________________    Subjective  Patient ID: Karen Thomson is a 39 y.o. female who presents for recurrent pneumonia    HPI    Karen is a 39yr old that had a LLL pneumonia in October, and remained ill through November.  She is now feeling well.  She had a CXR in November that was reported to have persistent LLL infiltrate but it had actually cleared.  She had a CT chest in December that showed resolution of the LLL pneumonia but there is a persistent RLL wedge shaped nodule that is likely scar.  She is concerned because she has had pneumonia 3 times, this recent episode, in 2021 and in 2017.  Between these episodes she is fine.  When she was being worked up for IVF she had some labs and \"something\" was very low she that doctor gave her the pneumococcal vaccine.    She never smoked    Review of Systems    See scanned documents attached to this note for review of systems, and appropriate scales/scores for this visit.     Objective  Physical Exam  Constitutional:       Appearance: Normal appearance.   HENT:      Head: Normocephalic and atraumatic.      Mouth/Throat:      Pharynx: Oropharynx is clear.   Cardiovascular:      Rate and Rhythm: Normal rate and regular rhythm.      Pulses: Normal pulses.      Heart sounds: Normal heart sounds.   Pulmonary:      Effort: Pulmonary effort is normal.      Breath " sounds: Normal breath sounds. No wheezing, rhonchi or rales.   Abdominal:      General: Bowel sounds are normal.      Palpations: Abdomen is soft.   Musculoskeletal:         General: Normal range of motion.   Skin:     General: Skin is warm and dry.   Neurological:      General: No focal deficit present.      Mental Status: She is alert and oriented to person, place, and time.   Psychiatric:         Mood and Affect: Mood normal.       Assessment/Plan    39yr old with recent pneumonia but history of pneumonia 3 times in total.  No other significant illnesses    Pneumonia:  recovered now (took over a month with 2 rounds of antibiotics    2. ?immune issues:  3 pneumonias but years apart with no significant infectious issues between.  A lab value prompted another physician to give her the pneumococcal vaccine in the past.    - she will get the records from that encounter  - ordered immunoglobulin levels    3. RLL nodule:  likely scar from previous pneumonia.  - will get repeat CT in 1 year, if stable no further imaging    Follow up 1-2 months (virtual is ok)            Wallace Ritchie MD 01/28/25 7:46 AM

## 2025-02-27 ENCOUNTER — APPOINTMENT (OUTPATIENT)
Dept: PRIMARY CARE | Facility: CLINIC | Age: 40
End: 2025-02-27
Payer: COMMERCIAL

## 2025-02-27 DIAGNOSIS — F41.9 ANXIETY: Primary | ICD-10-CM

## 2025-02-27 PROCEDURE — 99213 OFFICE O/P EST LOW 20 MIN: CPT

## 2025-02-27 RX ORDER — CITALOPRAM 40 MG/1
40 TABLET, FILM COATED ORAL DAILY
Qty: 90 TABLET | Refills: 3 | Status: SHIPPED | OUTPATIENT
Start: 2025-02-27 | End: 2026-02-27

## 2025-02-27 ASSESSMENT — ENCOUNTER SYMPTOMS
DEPRESSED MOOD: 0
HYPERVENTILATION: 1
DIZZINESS: 0
PALPITATIONS: 0
SHORTNESS OF BREATH: 0
INSOMNIA: 1
PANIC: 0
NERVOUS/ANXIOUS: 1

## 2025-02-27 NOTE — PROGRESS NOTES
On Demand Virtual Visit Patient Consent     This visit was completed via video conference. All issues as below were discussed and addressed but no physical exam was performed. If it was felt that the patient should be evaluated in clinic than they were directed there. The patient verbally consented to the visit.    An interactive audio and video telecommunication system which permits real time communications between the patient (at the originating site) and provider (at the distant site) was utilized to provide this telehealth service.   Verbal consent was requested and obtained from Karen Thomson (or parent if under 18) 02/27/25 for a telehealth visit.   I have verbally confirmed with Karen Thomson (or parent if under 18) that they are physically located in the [facility] during this virtual visit.    Subjective   Patient ID: Karen Thomson is a 39 y.o. female who presents for Anxiety.    Anxiety  Presents for follow-up visit. Symptoms include excessive worry, hyperventilation, insomnia and nervous/anxious behavior. Patient reports no depressed mood, dizziness, palpitations, panic, shortness of breath or suicidal ideas. Primary symptoms comment: feels symptoms are much improved with celexa. The quality of sleep is fair. Nighttime awakenings: occasional.            Review of Systems   Respiratory:  Negative for shortness of breath.    Cardiovascular:  Negative for palpitations.   Neurological:  Negative for dizziness.   Psychiatric/Behavioral:  Negative for suicidal ideas. The patient is nervous/anxious and has insomnia.        Objective   There were no vitals taken for this visit.    Physical Exam  Constitutional:       General: She is not in acute distress.     Appearance: Normal appearance. She is not ill-appearing.   Pulmonary:      Effort: Pulmonary effort is normal.   Neurological:      Mental Status: She is alert and oriented to person, place, and time.   Psychiatric:         Attention and  Perception: Attention and perception normal.         Mood and Affect: Mood and affect normal.         Speech: Speech normal.         Behavior: Behavior normal. Behavior is cooperative.         Cognition and Memory: Cognition normal.         Judgment: Judgment normal.          Assessment/Plan   Karen was seen today for anxiety.  Diagnoses and all orders for this visit:  Anxiety  -     citalopram (CeleXA) 40 mg tablet; Take 1 tablet (40 mg) by mouth once daily.

## 2025-03-24 ENCOUNTER — APPOINTMENT (OUTPATIENT)
Dept: PULMONOLOGY | Facility: CLINIC | Age: 40
End: 2025-03-24
Payer: COMMERCIAL

## 2025-05-16 ENCOUNTER — OFFICE VISIT (OUTPATIENT)
Dept: URGENT CARE | Age: 40
End: 2025-05-16
Payer: COMMERCIAL

## 2025-05-16 VITALS
TEMPERATURE: 98.3 F | SYSTOLIC BLOOD PRESSURE: 121 MMHG | BODY MASS INDEX: 25.15 KG/M2 | RESPIRATION RATE: 16 BRPM | OXYGEN SATURATION: 99 % | HEART RATE: 84 BPM | WEIGHT: 142 LBS | DIASTOLIC BLOOD PRESSURE: 82 MMHG

## 2025-05-16 DIAGNOSIS — J01.90 ACUTE SINUSITIS, RECURRENCE NOT SPECIFIED, UNSPECIFIED LOCATION: Primary | ICD-10-CM

## 2025-05-16 RX ORDER — AMOXICILLIN 875 MG/1
875 TABLET, COATED ORAL 2 TIMES DAILY
Qty: 14 TABLET | Refills: 0 | Status: SHIPPED | OUTPATIENT
Start: 2025-05-16 | End: 2025-05-23

## 2025-05-16 ASSESSMENT — ENCOUNTER SYMPTOMS
SINUS PAIN: 1
SINUS PRESSURE: 1

## 2025-05-16 NOTE — PROGRESS NOTES
Subjective   Patient ID: Karen Thomson is a 39 y.o. female. They present today with a chief complaint of Sinus Problem (For 1 week).    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 She has never been exposed to tobacco smoke. She has never used smokeless tobacco. She reports current alcohol use. She reports that she does not use drugs.    Review of Systems  Review of Systems   HENT:  Positive for congestion, sinus pressure and sinus pain.    All other systems reviewed and are negative.                                 Objective    Vitals:    05/16/25 1633   BP: 121/82   BP Location: Left arm   Patient Position: Sitting   BP Cuff Size: Adult   Pulse: 84   Resp: 16   Temp: 36.8 °C (98.3 °F)   TempSrc: Temporal   SpO2: 99%   Weight: 64.4 kg (142 lb)     No LMP recorded.    Physical Exam  Vitals reviewed.   Constitutional:       Appearance: Normal appearance.   HENT:      Head: Normocephalic and atraumatic.      Right Ear: Tympanic membrane, ear canal and external ear normal.      Left Ear: Tympanic membrane, ear canal and external ear normal.      Nose: Congestion present.      Mouth/Throat:      Mouth: Mucous membranes are moist.      Pharynx: Oropharynx is clear.   Eyes:      Extraocular Movements: Extraocular movements intact.      Conjunctiva/sclera: Conjunctivae normal.      Pupils: Pupils are equal, round, and reactive to light.   Cardiovascular:      Rate and Rhythm: Normal rate and regular rhythm.      Pulses: Normal pulses.      Heart sounds: Normal heart sounds.   Pulmonary:      Effort: Pulmonary effort is normal.   Musculoskeletal:         General: Normal range of motion.      Cervical back: Normal range of motion.    Skin:     General: Skin is warm.      Capillary Refill: Capillary refill takes less than 2 seconds.   Neurological:      General: No focal deficit present.      Mental Status: She is alert.   Psychiatric:         Mood and Affect: Mood normal.         Behavior: Behavior normal.         Procedures    Point of Care Test & Imaging Results from this visit  No results found for this visit on 05/16/25.   Imaging  No results found.    Cardiology, Vascular, and Other Imaging  No other imaging results found for the past 2 days      Diagnostic study results (if any) were reviewed by LAUREN Gonsales.    Assessment/Plan   Allergies, medications, history, and pertinent labs/EKGs/Imaging reviewed by LAUREN Gonsales.     Medical Decision Making  ***    Orders and Diagnoses  There are no diagnoses linked to this encounter.    Medical Admin Record      Patient disposition: { Disposition:[id number]}    Electronically signed by LAUREN Gonsales  4:36 PM           [1] (Not in a hospital admission)  [2]   Past Medical History:  Diagnosis Date    Acute pharyngitis, unspecified 08/23/2019    Pharyngitis with viral syndrome    Acute upper respiratory infection, unspecified 10/23/2016    Acute URI    Adjustment disorder with depressed mood 11/25/2019    Adjustment disorder with depressed mood    Anemia complicating pregnancy, unspecified trimester ([facility]) 03/16/2016    Anemia in pregnancy    Anxiety disorder, unspecified 11/25/2019    Acute anxiety    Body mass index (BMI) 39.0-39.9, adult 04/26/2021    Body mass index (BMI) of 39.0 to 39.9 in adult    Change in bowel habit 01/13/2015    Change in bowel habits    Chronic sinusitis, unspecified 05/11/2017    Recurrent sinusitis    Class 1 obesity with body mass index (BMI) of 33.0 to 33.9 in adult 11/08/2023    Diarrhea, unspecified 11/02/2021    Acute diarrhea    Disease of upper respiratory tract, unspecified 11/02/2021    Upper  respiratory disease    Dry eye syndrome of bilateral lacrimal glands 2017    Bilateral dry eyes    Encounter for routine postpartum follow-up 2016    Routine postpartum follow-up    Encounter for screening for human immunodeficiency virus (HIV) 2015    Encounter for screening for HIV    Encounter for screening for other infectious and parasitic diseases 2015    Special screening examination for other specified chlamydial diseases    Encounter for screening for other viral diseases 2015    Encounter for screening for other viral diseases    Fever, unspecified 2021    Fever and chills    Headache, unspecified 2017    Severe headache    History of  section 2020    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    Decreased milk production    Localized enlarged lymph nodes 10/20/2016    Anterior cervical adenopathy    Low vitamin D level 2023    Metrorrhagia 2023    Obesity, unspecified 10/25/2021    Class 2 obesity with body mass index (BMI) of 37.0 to 37.9 in adult    Obesity, unspecified 2021    Class 2 obesity with body mass index (BMI) of 36.0 to 36.9 in adult    Ocular pain, left eye 2017    Acute left eye pain    Ocular pain, left eye 2017    Pain of left eye    Other abnormal and inconclusive findings on diagnostic imaging of breast 2017    Abnormal ultrasound of breast    Other conditions influencing health status 2017    History of cough    Other specified health status     No pertinent past medical history    Other specified noninflammatory disorders of vagina 08/15/2018    Vaginal cyst    Other specified noninflammatory disorders of vagina     Vaginal odor    Palpitations 2015    Palpitations    Personal history of diseases of the skin and subcutaneous tissue 2017    History of skin pruritus    Personal history of diseases of the skin and subcutaneous tissue 2018    History of cyst of breast    Personal  history of other complications of pregnancy, childbirth and the puerperium 09/11/2020    History of postpartum depression    Personal history of other diseases of the female genital tract 06/27/2015    History of female infertility    Personal history of other diseases of the female genital tract 04/05/2018    History of breast pain    Personal history of other diseases of the nervous system and sense organs 11/08/2021    History of conjunctivitis    Personal history of other diseases of the respiratory system 08/23/2019    History of sore throat    Personal history of other diseases of the respiratory system 03/02/2020    History of acute pharyngitis    Personal history of other diseases of the respiratory system 05/14/2017    History of sinusitis    Personal history of other diseases of the respiratory system 03/20/2019    History of influenza    Personal history of other diseases of urinary system 04/26/2021    H/O acute pyelonephritis    Personal history of other endocrine, nutritional and metabolic disease 06/02/2021    History of obesity    Personal history of other infectious and parasitic diseases 08/23/2019    History of viral gastroenteritis    Personal history of other specified conditions 11/22/2017    History of diarrhea    Personal history of other specified conditions 12/02/2021    History of fatigue    Personal history of other specified conditions 01/13/2015    History of diarrhea    Personal history of other specified conditions 01/13/2015    History of abdominal pain    Personal history of other specified conditions 10/01/2015    History of urinary frequency    Personal history of pneumonia (recurrent) 12/02/2021    History of recurrent pneumonia    Postpartum depression 06/28/2016    Post partum depression    Postpartum depression 04/04/2016    Postpartum depression    Procreative counseling and advice using natural family planning 12/04/2014    Procreative counseling and advice using natural  family planning    Rash and other nonspecific skin eruption 2017    Rash    Unspecified acute conjunctivitis, bilateral 2020    Acute bacterial conjunctivitis of both eyes   [3]   Past Surgical History:  Procedure Laterality Date    OTHER SURGICAL HISTORY  2018    Laparoscopic Aspiration Uterine Mass    OTHER SURGICAL HISTORY  2020     section    TONSILLECTOMY  2015    Tonsillectomy

## 2025-05-16 NOTE — PATIENT INSTRUCTIONS
Start amoxicillin as directed follow-up with your primary care doctor 1 to 2 days Go the emergency department with any worsening symptoms.

## 2025-06-09 ENCOUNTER — TELEPHONE (OUTPATIENT)
Dept: PULMONOLOGY | Facility: CLINIC | Age: 40
End: 2025-06-09
Payer: COMMERCIAL

## 2025-06-09 NOTE — TELEPHONE ENCOUNTER
Pt called and said that she found the date of her pneumococcal vaccine under the old  system she said the date 12/12/2017. Pt said that this date was needed as pt is wanted to get the vaccine again since she has had pneumonia twice. She is getting the blood work done tomorrow 06/10/2025.    Thank you!

## 2025-06-10 ENCOUNTER — APPOINTMENT (OUTPATIENT)
Dept: PRIMARY CARE | Facility: CLINIC | Age: 40
End: 2025-06-10
Payer: COMMERCIAL

## 2025-06-10 VITALS
BODY MASS INDEX: 26.05 KG/M2 | WEIGHT: 147 LBS | HEART RATE: 77 BPM | SYSTOLIC BLOOD PRESSURE: 122 MMHG | DIASTOLIC BLOOD PRESSURE: 88 MMHG | HEIGHT: 63 IN | OXYGEN SATURATION: 99 %

## 2025-06-10 DIAGNOSIS — R05.2 SUBACUTE COUGH: ICD-10-CM

## 2025-06-10 DIAGNOSIS — J40 BRONCHITIS: Primary | ICD-10-CM

## 2025-06-10 PROCEDURE — 99213 OFFICE O/P EST LOW 20 MIN: CPT

## 2025-06-10 PROCEDURE — 3008F BODY MASS INDEX DOCD: CPT

## 2025-06-10 RX ORDER — GUAIFENESIN AND PSEUDOEPHEDRINE HCL 600; 60 MG/1; MG/1
1 TABLET, EXTENDED RELEASE ORAL EVERY 12 HOURS
COMMUNITY

## 2025-06-10 RX ORDER — AMOXICILLIN AND CLAVULANATE POTASSIUM 875; 125 MG/1; MG/1
875 TABLET, FILM COATED ORAL 2 TIMES DAILY
Qty: 20 TABLET | Refills: 0 | Status: SHIPPED | OUTPATIENT
Start: 2025-06-10 | End: 2025-06-20

## 2025-06-10 RX ORDER — PROMETHAZINE HYDROCHLORIDE AND DEXTROMETHORPHAN HYDROBROMIDE 6.25; 15 MG/5ML; MG/5ML
5 SYRUP ORAL 4 TIMES DAILY PRN
Qty: 473 ML | Refills: 0 | Status: SHIPPED | OUTPATIENT
Start: 2025-06-10

## 2025-06-10 ASSESSMENT — ENCOUNTER SYMPTOMS
HEADACHES: 1
WHEEZING: 0
MYALGIAS: 1
WEIGHT LOSS: 0
RHINORRHEA: 1
HEARTBURN: 0
COUGH: 1
SHORTNESS OF BREATH: 0

## 2025-06-10 ASSESSMENT — PATIENT HEALTH QUESTIONNAIRE - PHQ9
2. FEELING DOWN, DEPRESSED OR HOPELESS: NOT AT ALL
1. LITTLE INTEREST OR PLEASURE IN DOING THINGS: NOT AT ALL
SUM OF ALL RESPONSES TO PHQ9 QUESTIONS 1 AND 2: 0

## 2025-06-10 NOTE — PROGRESS NOTES
"Subjective   Patient ID: Karen Thomson is a 39 y.o. female who presents for Sinusitis (Started feeling symptoms on the 14th, went to  on the 16th, finished the antibiotic, feels better but not gone away fully, ears are still clogged, sinus drainage, yellow-green mucous coming up when coughing, still has sinus pressure but not as bad, has been taking mucinex d every day. ).    Sinusitis  This is a recurrent problem. The current episode started 1 to 4 weeks ago. The problem is unchanged. There has been no fever. Associated symptoms include coughing and headaches. Pertinent negatives include no shortness of breath.   Cough  This is a recurrent problem. The current episode started 1 to 4 weeks ago. The problem has been unchanged. The problem occurs constantly. The cough is Productive of purulent sputum. Associated symptoms include headaches, myalgias and rhinorrhea. Pertinent negatives include no chest pain, heartburn, shortness of breath, weight loss or wheezing. Nothing aggravates the symptoms. She has tried rest (antibiotics) for the symptoms. Her past medical history is significant for pneumonia.        Review of Systems   Constitutional:  Negative for weight loss.   HENT:  Positive for rhinorrhea.    Respiratory:  Positive for cough. Negative for shortness of breath and wheezing.    Cardiovascular:  Negative for chest pain.   Gastrointestinal:  Negative for heartburn.   Musculoskeletal:  Positive for myalgias.   Neurological:  Positive for headaches.       Objective   /88   Pulse 77   Ht 1.6 m (5' 3\")   Wt 66.7 kg (147 lb)   SpO2 99%   BMI 26.04 kg/m²     Physical Exam  Constitutional:       General: She is not in acute distress.     Appearance: Normal appearance. She is not ill-appearing.   Cardiovascular:      Heart sounds: Normal heart sounds.   Pulmonary:      Breath sounds: Transmitted upper airway sounds present. No stridor or decreased air movement. No rhonchi or rales.   Neurological:     "  Mental Status: She is alert and oriented to person, place, and time.         Assessment/Plan   Karen was seen today for sinusitis.  Diagnoses and all orders for this visit:  Bronchitis  -     amoxicillin-clavulanate (Augmentin) 875-125 mg tablet; Take 1 tablet (875 mg of amoxicillin) by mouth 2 times a day for 10 days.  Subacute cough  -     promethazine-DM (Phenergan-DM) 6.25-15 mg/5 mL syrup; Take 5 mL by mouth 4 times a day as needed for cough.

## 2025-06-10 NOTE — PATIENT INSTRUCTIONS
follow-up with your PCP in 2-3 days, sooner if not  improving.    Follow-up immediately if symptoms worsen. If experiencing symptoms including but not limited to lethargy / chest pain / weakness / dizziness / difficulty breathing please call 911 or go to the emergency department for immediate care.     Drinking plenty of fluids and getting lots of rest. Chicken soup and hot beverages may help.    Trial of nasal irrigation with a Nettipot or squeeze bottle with sterile salt water.    Nasal spray corticosteroids (Flonase) may help in reducing the inflammatory response in the nasal passages and airways. Please try 2 sprays each nostril daily for 2 weeks.  If you have season allergies, please take a daily antihistamine of your choice, such as Zyrtec/Claritin/Allegra at bedtime.    Take Tylenol or Motrin/Aleve for sinus or ear pain.    If you have good blood pressure you can try pseudofed (you must ask the pharmacist for it and show ID).    Please call or return to the office if you are not feeling better in the next 3-4 days after starting treatment.    Over-the-counter cold and cough medications     Take Mucinex for cough, drink plenty of fluids with this medication and will help break up congestion making it easier to cough up     For cough can use honey (children ages 1 and up) in hot tea or hot water. I recommend putting this in an insulated cup and carrying it around throughout the day to sip on.  Have it at your bedside at night in case you wake up coughing.  You can also use honey cough drops (adults and older children).     Recommend nasal saline for use in children and adults.  Neti Jaeger can also be helpful.  (Never used tap water and a Neti pot.  Use distilled water.)     If you have plugged up congested ears or the feeling of fluid in your ears, you can use an over-the-counter nasal steroid spray like fluticasone (brand name Flonase) use 2 sprays into each nostril once or twice a day for 7 days.  This will  help open up the eustachian tubes and allow the fluid to drain out of your ears.     Sleeping with your head/chest elevated can help with sinus drainage.     Adults only-can use nasal decongestant (like Afrin) at bedtime to open nasal passages so you can breathe through your nose while you sleep; avoid using for longer than 3 days as this medication can become addicting.  Do not use if you have high blood pressure or high heart rate.     For severe pain or fever in adult-Tylenol (2 extra strength) or ibuprofen (3-4 tabs equals 600 to 800 mg) alternating as needed for pain.  Tylenol doses should be 6 to 8 hours apart and ibuprofen doses should be 6 to 8 hours apart.        Common cold medications for adults explained:     Mucinex-(generic name guaifenesin)-is an expectorant.  This will thin out all the thick mucus.  Must drink plenty of liquids for this medicine to work.     Dextromethorphan (brand name Delsym or DM)-this medicine is a cough suppressant     Honey in hot water or tea-this is a natural cough suppressant     Decongestant nasal spray- (eg: Afrin) use for temporary relief of nasal congestion-best when used at bedtime to open up nasal passages so that you are not forced to mouth breathe overnight.     Sudafed (generic name pseudoephedrine-this must be bought from the pharmacist) DO NOT use this medicine if you have high blood pressure as it can raise your blood pressure higher.  Do not use if you have any irregular heart rate.  This medicine can help clear congestion in your sinuses.    URIs usually improves within 2 weeks, but at times the cough will persist for three or four weeks beyond that. If you are experiencing any shortness of breath, developing wheezing, chest pain, or getting worse rather than better after the above measures, and call the office for further evaluation.

## 2025-07-09 LAB
IGA SERPL-MCNC: 132 MG/DL (ref 47–310)
IGE SERPL-ACNC: 42 KU/L
IGG SERPL-MCNC: 712 MG/DL (ref 600–1640)
IGG1 SER-MCNC: 416 MG/DL (ref 382–929)
IGG2 SER-MCNC: 224 MG/DL (ref 241–700)
IGG3 SER-MCNC: 14 MG/DL (ref 22–178)
IGG4 SER-MCNC: 5.4 MG/DL (ref 4–86)
IGM SERPL-MCNC: 128 MG/DL (ref 50–300)

## 2025-07-22 ENCOUNTER — TELEPHONE (OUTPATIENT)
Dept: PULMONOLOGY | Facility: CLINIC | Age: 40
End: 2025-07-22
Payer: COMMERCIAL

## 2025-07-22 NOTE — TELEPHONE ENCOUNTER
Pt called stating she saw that her labs were back  and wants to know what the next step is  since she noticed  one was abnormal . Please Advise. Thank You .

## 2025-08-01 ENCOUNTER — OFFICE VISIT (OUTPATIENT)
Dept: PRIMARY CARE | Facility: CLINIC | Age: 40
End: 2025-08-01
Payer: COMMERCIAL

## 2025-08-01 VITALS
HEART RATE: 78 BPM | DIASTOLIC BLOOD PRESSURE: 80 MMHG | WEIGHT: 146 LBS | SYSTOLIC BLOOD PRESSURE: 123 MMHG | HEIGHT: 63 IN | BODY MASS INDEX: 25.87 KG/M2 | OXYGEN SATURATION: 98 %

## 2025-08-01 DIAGNOSIS — R22.32 MASS OF ARM, LEFT: Primary | ICD-10-CM

## 2025-08-01 PROCEDURE — 3008F BODY MASS INDEX DOCD: CPT

## 2025-08-01 PROCEDURE — 99213 OFFICE O/P EST LOW 20 MIN: CPT

## 2025-08-01 PROCEDURE — 1036F TOBACCO NON-USER: CPT

## 2025-08-01 ASSESSMENT — ENCOUNTER SYMPTOMS
FATIGUE: 0
VOMITING: 0
COUGH: 1
SHORTNESS OF BREATH: 0
ANOREXIA: 0

## 2025-08-01 NOTE — PROGRESS NOTES
"Subjective   Patient ID: Karen Thomson is a 39 y.o. female who presents for Mass (Patient is present in office for a lump on left arm x weeks - painful at times. ) and Night Sweats (Has been having night sweats x months - unsure if its a side effect of medication ).    Night sweats,    Rash  This is a new problem. Episode onset: couple weeks ago. The problem is unchanged. The affected locations include the left arm. Rash characteristics: small round lump under skin. She was exposed to nothing. Associated symptoms include coughing. Pertinent negatives include no anorexia, congestion, facial edema, fatigue, joint pain, shortness of breath or vomiting. Past treatments include nothing.        Review of Systems   Constitutional:  Negative for fatigue.   HENT:  Negative for congestion.    Respiratory:  Positive for cough. Negative for shortness of breath.    Gastrointestinal:  Negative for anorexia and vomiting.   Musculoskeletal:  Negative for joint pain.   Skin:  Positive for rash.       Objective   /80   Pulse 78   Ht 1.6 m (5' 3\")   Wt 66.2 kg (146 lb)   SpO2 98%   BMI 25.86 kg/m²     Physical Exam  Vitals reviewed.   Constitutional:       General: She is not in acute distress.     Appearance: Normal appearance. She is not ill-appearing.   Pulmonary:      Effort: Pulmonary effort is normal.     Skin:          Comments: Golf ball size soft mobile mass     Neurological:      Mental Status: She is alert and oriented to person, place, and time.     Psychiatric:         Behavior: Behavior normal.         Assessment/Plan   Karen was seen today for mass and night sweats.  Diagnoses and all orders for this visit:  Mass of arm, left  -     Cancel: US MSK upper extremity joints tendons muscles; Future  -     US extremity nonvascular real time w image documentation limited anatomic specific; Future           "

## 2025-08-06 ENCOUNTER — HOSPITAL ENCOUNTER (OUTPATIENT)
Dept: RADIOLOGY | Facility: HOSPITAL | Age: 40
Discharge: HOME | End: 2025-08-06
Payer: COMMERCIAL

## 2025-08-06 DIAGNOSIS — R22.32 MASS OF ARM, LEFT: ICD-10-CM

## 2025-08-06 PROCEDURE — 76882 US LMTD JT/FCL EVL NVASC XTR: CPT | Performed by: STUDENT IN AN ORGANIZED HEALTH CARE EDUCATION/TRAINING PROGRAM

## 2025-08-06 PROCEDURE — 76882 US LMTD JT/FCL EVL NVASC XTR: CPT

## 2025-08-28 ENCOUNTER — APPOINTMENT (OUTPATIENT)
Dept: OBSTETRICS AND GYNECOLOGY | Facility: CLINIC | Age: 40
End: 2025-08-28
Payer: COMMERCIAL

## 2025-12-11 ENCOUNTER — APPOINTMENT (OUTPATIENT)
Dept: OBSTETRICS AND GYNECOLOGY | Facility: CLINIC | Age: 40
End: 2025-12-11
Payer: COMMERCIAL